# Patient Record
Sex: FEMALE | Race: WHITE | Employment: OTHER | URBAN - METROPOLITAN AREA
[De-identification: names, ages, dates, MRNs, and addresses within clinical notes are randomized per-mention and may not be internally consistent; named-entity substitution may affect disease eponyms.]

---

## 2017-05-04 ENCOUNTER — OFFICE VISIT (OUTPATIENT)
Dept: OBGYN CLINIC | Age: 77
End: 2017-05-04

## 2017-05-04 VITALS
WEIGHT: 150 LBS | SYSTOLIC BLOOD PRESSURE: 128 MMHG | HEIGHT: 65 IN | DIASTOLIC BLOOD PRESSURE: 86 MMHG | BODY MASS INDEX: 24.99 KG/M2

## 2017-05-04 DIAGNOSIS — Z01.419 ENCOUNTER FOR GYNECOLOGICAL EXAMINATION WITHOUT ABNORMAL FINDING: Primary | ICD-10-CM

## 2017-05-04 NOTE — PATIENT INSTRUCTIONS
Pelvic Exam: Care Instructions  Your Care Instructions    When your doctor examines all of your pelvic organs, it's called a pelvic exam. Two good reasons to have this kind of exam are to check for sexually transmitted infections (STIs) and to get a Pap test. A Pap test is also called a Pap smear. It checks for early changes that can lead to cancer of the cervix. Sometimes a pelvic exam is part of a regular checkup. In this case, you can do some things to make your test results as accurate as possible. · Try to schedule the exam when you don't have your period. · Don't use douches, tampons, or vaginal medicines, sprays, or powders for 24 hours before your exam.  · Don't have sex for 24 hours before your exam.  Other times, women have this kind of exam at any time of the month. This is because they have pelvic pain, bleeding, or discharge. Or they may have another pelvic problem. Before your exam, it's important to share some information with your doctor. For example, if you are a survivor of rape or sexual abuse, you can talk about any concerns you may have. Your doctor will also want to know if you are pregnant or use birth control. And he or she will want to hear about any problems, surgeries, or procedures you have had in your pelvic area. You will also need to tell your doctor when your last period was. Follow-up care is a key part of your treatment and safety. Be sure to make and go to all appointments, and call your doctor if you are having problems. It's also a good idea to know your test results and keep a list of the medicines you take. How is a pelvic exam done? · During a pelvic exam, you will:  ¨ Take off your clothes below the waist. You will get a paper or cloth cover to put over the lower half of your body. Madhavi Dawley on your back on an exam table. Your feet will be raised above you. Stirrups will support your feet. · The doctor will:  Seth Baer you to relax your knees.  Your knees need to lean out, toward the walls. ¨ Check the opening of your vagina for sores or swelling. ¨ Gently put a tool called a speculum into your vagina. It opens the vagina a little bit. You will feel some pressure. But if you are relaxed, it will not hurt. It lets your doctor see inside the vagina. ¨ Use a small brush, spatula, or swab to get a sample of cells, if you are having a Pap test or culture. The doctor then removes the speculum. ¨ Put on gloves and put one or two fingers of one hand into your vagina. The other hand goes on your lower belly. This lets your doctor feel your pelvic organs. You will probably feel some pressure. Try to stay relaxed. ¨ Put one gloved finger into your rectum and one into your vagina, if needed. This can also help check your pelvic organs. This exam takes about 10 minutes. At the end, you will get a washcloth or tissue to clean your vaginal area. It's normal to have some discharge after this exam. You can then get dressed. Some test results may be ready right away. But results from a culture or a Pap test may take several days or a few weeks. Why should you have a pelvic exam?  · You want to have recommended screening tests. This includes a Pap test.  · You think you have a vaginal infection. Signs include itching, burning, or unusual discharge. · You might have been exposed to a sexually transmitted infection (STI), such as chlamydia or herpes. · You have vaginal bleeding that is not part of your normal menstrual period. · You have pain in your belly or pelvis. · You have been sexually assaulted. A pelvic exam lets your doctor collect evidence and check for STIs. · You are pregnant. · You are having trouble getting pregnant. What are the risks of a pelvic exam?  There are no risks from a pelvic exam.  When should you call for help?   Watch closely for changes in your health, and be sure to contact your doctor if:  · You have heavy bleeding or discharge from your vagina after the exam.  Where can you learn more? Go to http://jayne-neelima.info/. Enter L091 in the search box to learn more about \"Pelvic Exam: Care Instructions. \"  Current as of: October 13, 2016  Content Version: 11.2  © 6923-3655 [employer], [employer]. Care instructions adapted under license by [employer] (which disclaims liability or warranty for this information). If you have questions about a medical condition or this instruction, always ask your healthcare professional. [address] any warranty or liability for your use of this information.

## 2017-05-04 NOTE — PROGRESS NOTES
Annual exam ages 69+    Soila Mosqueda is a No obstetric history on file. ,  78 y.o. female [facility] No LMP recorded. Patient is postmenopausal.  She presents for her annual checkup. She is having no significant problems. Menstrual status:    She is post menopausal.    She denies dysmenorrhea. Hormonal status:  She reports no perimenstrual type symptoms. She is not having vasomotor symptoms. The patient is not using any ERT. Sexual history:    She  reports that she currently engages in sexual activity and has had male partners. She reports using the following method of birth control/protection: None. Medical conditions:    Since her last annual GYN exam about 5 years ago, she has not the following changes in her health history: none. Pap and Mammogram History:    Her most recent Pap smear was normal obtained 5 year(s) ago. The patient had a recent mammogram 8/2016 which was negative for malignancy. Breast Cancer History/Substance Abuse: negative    Osteoporosis History:    Family history does not include a first or second degree relative with osteopenia or osteoporosis. Past Medical History:   Diagnosis Date    Glaucoma     Pap smear for cervical cancer screening     2009 neg 2010 neg 2012 neg     Past Surgical History:   Procedure Laterality Date    HX TONSILLECTOMY           Allergies: Review of patient's allergies indicates no known allergies. Tobacco History:  reports that she has never smoked. She does not have any smokeless tobacco history on file. Alcohol Abuse:  reports that she drinks alcohol. Drug Abuse:  reports that she does not use illicit drugs.     Family Medical/Cancer History:   Family History   Problem Relation Age of Onset    No Known Problems Mother         Review of Systems - History obtained from the patient  Constitutional: negative for weight loss, fever, night sweats  HEENT: negative for hearing loss, earache, congestion, snoring, sorethroat  CV: negative for chest pain, palpitations, edema  Resp: negative for cough, shortness of breath, wheezing  GI: negative for change in bowel habits, abdominal pain, black or bloody stools  : negative for frequency, dysuria, hematuria, vaginal discharge  MSK: negative for back pain, joint pain, muscle pain  Breast: negative for breast lumps, nipple discharge, galactorrhea  Skin :negative for itching, rash, hives  Neuro: negative for dizziness, headache, confusion, weakness  Psych: negative for anxiety, depression, change in mood  Heme/lymph: negative for bleeding, bruising, pallor    Physical Exam    Visit Vitals    /86    Ht 5' 5\" (1.651 m)    Wt 150 lb (68 kg)    BMI 24.96 kg/m2       Constitutional  · Appearance: well-nourished, well developed, alert, in no acute distress    HENT  · Head and Face: appears normal    Neck  · Inspection/Palpation: normal appearance, no masses or tenderness  · Lymph Nodes: no lymphadenopathy present  · Thyroid: gland size normal, nontender, no nodules or masses present on palpation    Chest  · Respiratory Effort: breathing unlabored    Breasts  · Inspection of Breasts: breasts symmetrical, no skin changes, no discharge present, nipple appearance normal, no skin retraction present  · Palpation of Breasts and Axillae: no masses present on palpation, no breast tenderness  · Axillary Lymph Nodes: no lymphadenopathy present    Gastrointestinal  · Abdominal Examination: abdomen non-tender to palpation, normal bowel sounds, no masses present  · Liver and spleen: no hepatomegaly present, spleen not palpable  · Hernias: no hernias identified    Genitourinary  · External Genitalia: normal appearance for age, no discharge present, no tenderness present, no inflammatory lesions present, no masses present, atrophy present  · Vagina: atrophic but otherwise normal vaginal vault without central or paravaginal defects, no discharge present, no inflammatory lesions present, no masses present  · Bladder: non-tender to palpation  · Urethra: appears normal  · Cervix: normal   · Uterus: normal size, shape and consistency  · Adnexa: no adnexal tenderness present, no adnexal masses present  · Perineum: perineum within normal limits, no evidence of trauma, no rashes or skin lesions present  · Anus: anus within normal limits, no hemorrhoids present  · Inguinal Lymph Nodes: no lymphadenopathy present    Skin  · General Inspection: no rash, no lesions identified    Neurologic/Psychiatric  · Mental Status:  · Orientation: grossly oriented to person, place and time  · Mood and Affect: mood normal, affect appropriate    Assessment:  Routine gynecologic examination  Her current medical status is satisfactory with no evidence of significant gynecologic issues.     Plan:  Counseled re: diet, exercise, healthy lifestyle  Return for yearly wellness visits  Rec annual mammogram

## 2017-05-04 NOTE — MR AVS SNAPSHOT
Visit Information Date & Time Provider Department Dept. Phone Encounter #  
 5/4/2017  9:20 AM Kayla Argueta MD [facility] [phone] [id number] Your Appointments 6/1/2017  2:00 PM  
New Patient with Jeane James MD  
[facility]) Appt Note: np, tg 04/17/2017  
 [phone]  
[phone]  
  
   
 [address] [phone] Upcoming Health Maintenance Date Due FOBT Q 1 YEAR AGE 50-75 11/3/1992 ZOSTER VACCINE AGE 60> 11/3/2002 OSTEOPOROSIS SCREENING (DEXA) 11/3/2007 INFLUENZA AGE 9 TO ADULT 8/1/2017 Allergies as of 5/4/2017  Review Complete On: 5/4/2017 By: Rosa Schroeder No Known Allergies Current Immunizations  Never Reviewed No immunizations on file. Not reviewed this visit Vitals BP Height(growth percentile) Weight(growth percentile) BMI OB Status Smoking Status 128/86 5' 5\" (1.651 m) 150 lb (68 kg) 24.96 kg/m2 Postmenopausal Never Smoker BMI and BSA Data Body Mass Index Body Surface Area 24.96 kg/m 2 1.77 m 2 Your Updated Medication List  
  
Notice  As of 5/4/2017  9:35 AM  
 You have not been prescribed any medications. Patient Instructions Pelvic Exam: Care Instructions Your Care Instructions When your doctor examines all of your pelvic organs, it's called a pelvic exam. Two good reasons to have this kind of exam are to check for sexually transmitted infections (STIs) and to get a Pap test. A Pap test is also called a Pap smear. It checks for early changes that can lead to cancer of the cervix. Sometimes a pelvic exam is part of a regular checkup. In this case, you can do some things to make your test results as accurate as possible. · Try to schedule the exam when you don't have your period.  
· Don't use douches, tampons, or vaginal medicines, sprays, or powders for 24 hours before your exam. 
· Don't have sex for 24 hours before your exam. 
Other times, women have this kind of exam at any time of the month. This is because they have pelvic pain, bleeding, or discharge. Or they may have another pelvic problem. Before your exam, it's important to share some information with your doctor. For example, if you are a survivor of rape or sexual abuse, you can talk about any concerns you may have. Your doctor will also want to know if you are pregnant or use birth control. And he or she will want to hear about any problems, surgeries, or procedures you have had in your pelvic area. You will also need to tell your doctor when your last period was. Follow-up care is a key part of your treatment and safety. Be sure to make and go to all appointments, and call your doctor if you are having problems. It's also a good idea to know your test results and keep a list of the medicines you take. How is a pelvic exam done? · During a pelvic exam, you will: ¨ Take off your clothes below the waist. You will get a paper or cloth cover to put over the lower half of your body. Zonia Men on your back on an exam table. Your feet will be raised above you. Stirrups will support your feet. · The doctor will: ¨ Ask you to relax your knees. Your knees need to lean out, toward the walls. ¨ Check the opening of your vagina for sores or swelling. ¨ Gently put a tool called a speculum into your vagina. It opens the vagina a little bit. You will feel some pressure. But if you are relaxed, it will not hurt. It lets your doctor see inside the vagina. ¨ Use a small brush, spatula, or swab to get a sample of cells, if you are having a Pap test or culture. The doctor then removes the speculum. ¨ Put on gloves and put one or two fingers of one hand into your vagina. The other hand goes on your lower belly. This lets your doctor feel your pelvic organs. You will probably feel some pressure. Try to stay relaxed. ¨ Put one gloved finger into your rectum and one into your vagina, if needed. This can also help check your pelvic organs. This exam takes about 10 minutes. At the end, you will get a washcloth or tissue to clean your vaginal area. It's normal to have some discharge after this exam. You can then get dressed. Some test results may be ready right away. But results from a culture or a Pap test may take several days or a few weeks. Why should you have a pelvic exam? 
· You want to have recommended screening tests. This includes a Pap test. 
· You think you have a vaginal infection. Signs include itching, burning, or unusual discharge. · You might have been exposed to a sexually transmitted infection (STI), such as chlamydia or herpes. · You have vaginal bleeding that is not part of your normal menstrual period. · You have pain in your belly or pelvis. · You have been sexually assaulted. A pelvic exam lets your doctor collect evidence and check for STIs. · You are pregnant. · You are having trouble getting pregnant. What are the risks of a pelvic exam? 
There are no risks from a pelvic exam. 
When should you call for help? Watch closely for changes in your health, and be sure to contact your doctor if: 
· You have heavy bleeding or discharge from your vagina after the exam. 
Where can you learn more? Go to http://jayne-neelima.info/. Enter Z287 in the search box to learn more about \"Pelvic Exam: Care Instructions. \" Current as of: October 13, 2016 Content Version: 11.2 © 8450-0897 [employer]. Care instructions adapted under license by [employer] (which disclaims liability or warranty for this information). If you have questions about a medical condition or this instruction, always ask your healthcare professional. [place] [address] any warranty or liability for your use of this information. Introducing [facility]! Dear Rere Pereira: Thank you for requesting a [employer] account. Our records indicate that you already have an active [employer] account. You can access your account anytime at [email or web address] Did you know that you can access your hospital and ER discharge instructions at any time in Elect[employer]us? You can also review all of your test results from your hospital stay or ER visit. Additional Information If you have questions, please visit the Frequently Asked Questions section of the [employer] website at [email or web address] Remember, Electrikus is NOT to be used for urgent needs. For medical emergencies, dial 911. Now available from your iPhone and Android! Please provide this summary of care documentation to your next provider. Your primary care clinician is listed as [address] [place] [address] Street. If you have any questions after today's visit, please call [phone].

## 2017-06-27 ENCOUNTER — OFFICE VISIT (OUTPATIENT)
Dept: INTERNAL MEDICINE CLINIC | Age: 77
End: 2017-06-27

## 2017-06-27 VITALS
RESPIRATION RATE: 18 BRPM | DIASTOLIC BLOOD PRESSURE: 57 MMHG | HEART RATE: 55 BPM | TEMPERATURE: 98.1 F | WEIGHT: 151.4 LBS | BODY MASS INDEX: 25.22 KG/M2 | SYSTOLIC BLOOD PRESSURE: 121 MMHG | HEIGHT: 65 IN | OXYGEN SATURATION: 98 %

## 2017-06-27 DIAGNOSIS — G89.29 CHRONIC BILATERAL LOW BACK PAIN WITHOUT SCIATICA: Primary | ICD-10-CM

## 2017-06-27 DIAGNOSIS — R73.02 GLUCOSE INTOLERANCE (IMPAIRED GLUCOSE TOLERANCE): ICD-10-CM

## 2017-06-27 DIAGNOSIS — G25.81 RESTLESS LEGS: ICD-10-CM

## 2017-06-27 DIAGNOSIS — H40.9 GLAUCOMA OF BOTH EYES, UNSPECIFIED GLAUCOMA: ICD-10-CM

## 2017-06-27 DIAGNOSIS — Z12.39 BREAST CANCER SCREENING: ICD-10-CM

## 2017-06-27 DIAGNOSIS — E55.9 VITAMIN D DEFICIENCY: ICD-10-CM

## 2017-06-27 DIAGNOSIS — Z00.00 WELL ADULT EXAM: ICD-10-CM

## 2017-06-27 DIAGNOSIS — M54.50 CHRONIC BILATERAL LOW BACK PAIN WITHOUT SCIATICA: Primary | ICD-10-CM

## 2017-06-27 RX ORDER — DORZOLAMIDE HYDROCHLORIDE AND TIMOLOL MALEATE 20; 5 MG/ML; MG/ML
1 SOLUTION/ DROPS OPHTHALMIC 2 TIMES DAILY
COMMUNITY
Start: 2017-05-22

## 2017-06-27 RX ORDER — OMEGA-3S/DHA/EPA/FISH OIL/D3 300MG-1000
CAPSULE ORAL
COMMUNITY
End: 2021-04-15

## 2017-06-27 RX ORDER — LATANOPROST 50 UG/ML
1 SOLUTION/ DROPS OPHTHALMIC DAILY
COMMUNITY
Start: 2017-04-25

## 2017-06-27 NOTE — PROGRESS NOTES
Collin Salas is a 78 y.o. female and presents with Establish Care (concerned may have thyroid problem) and Back Pain (chronic back pain)  . Subjective:  Pt presents for NPV. She came to [facility] to be near her daughters but both daughters moved. She feels she was misdiagnosed with diabetes many years ago. she was not in injcetion    Glaucoma  Followed by dr. Kuldeep Baird    Back pain  She has had physical therapy with some relief  She came off the opiod medication because she was feeling addicted to it and would wait to see when the [employer] would arrive with her medication  Floor exercises and sleeping on side are helpful  Does not hurt if sitting down or lying down  Most painful if standing severe pain after 5 min of standing  Some nights sleeps well but other nights not well rested. Restless legs  She reports she has to keep her legs moving and difficulty staying asleep because needs to move legs. She is concerned she has low thyroid because father and sister with thyroid issues    Review of Systems  Constitutional: negative for fevers, chills, anorexia and weight loss  Eyes:   negative for visual disturbance and irritation  ENT:   negative for tinnitus,sore throat,nasal congestion,ear pains. hoarseness  Respiratory:  negative for cough, hemoptysis, dyspnea,wheezing  CV:   negative for chest pain, palpitations, lower extremity edema  GI:   negative for nausea, vomiting, diarrhea, abdominal pain,melena  Endo:               negative for polyuria,polydipsia,polyphagia,heat intolerance  Genitourinary: negative for frequency, dysuria and hematuria  Integument:  negative for rash and pruritus  Hematologic:  negative for easy bruising and gum/nose bleeding  Musculoskel: negative for myalgias, arthralgias, back pain, muscle weakness, joint pain  Neurological:  negative for headaches, dizziness, vertigo, memory problems and gait   Behavl/Psych: negative for feelings of anxiety, depression, mood changes    Past Medical History:   Diagnosis Date    Glaucoma     Dr. Davis Quiet    Pap smear for cervical cancer screening     2009 neg 2010 neg 2012 neg     Past Surgical History:   Procedure Laterality Date    HX TONSILLECTOMY       Social History     Social History    Marital status:      Spouse name: N/A    Number of children: N/A    Years of education: N/A     Social History Main Topics    Smoking status: Never Smoker    Smokeless tobacco: Never Used    Alcohol use Yes      Comment: rarely    Drug use: No    Sexual activity: Yes     Partners: Male     Birth control/ protection: None     Other Topics Concern    None     Social History Narrative    Retired from [employer]         to  healthy    2 children daughters : healthy    2 grandson's : healthy     Family History   Problem Relation Age of Onset    No Known Problems Mother      Current Outpatient Prescriptions   Medication Sig Dispense Refill    dorzolamide-timolol (COSOPT) 22.3-6.8 mg/mL ophthalmic solution       latanoprost (XALATAN) 0.005 % ophthalmic solution       cholecalciferol, vitamin D3, (VITAMIN D3) 2,000 unit tab Take  by mouth.        No Known Allergies    Objective:  Visit Vitals    /57    Pulse (!) 55    Temp 98.1 °F (36.7 °C) (Oral)    Resp 18    Ht 5' 5\" (1.651 m)    Wt 151 lb 6.4 oz (68.7 kg)    SpO2 98%    BMI 25.19 kg/m2     Physical Exam:   General appearance - alert, well appearing, and in no distress  Mental status - alert, oriented to person, place, and time  EYE-CHENG, EOMI, corneas normal, no foreign bodies, visual acuity normal both eyes, no periorbital cellulitis  ENT-ENT exam normal, no neck nodes or sinus tenderness  Nose - normal and patent, no erythema, discharge or polyps  Mouth - mucous membranes moist, pharynx normal without lesions  Neck - supple, no significant adenopathy   Chest - clear to auscultation, no wheezes, rales or rhonchi, symmetric air entry   Heart - normal rate, regular rhythm, normal S1, S2, no murmurs, rubs, clicks or gallops   Abdomen - soft, nontender, nondistended, no masses or organomegaly  Lymph- no adenopathy palpable  Ext-peripheral pulses normal, no pedal edema, no clubbing or cyanosis  Skin-Warm and dry. no hyperpigmentation, vitiligo, or suspicious lesions  Neuro -alert, oriented, normal speech, no focal findings or movement disorder noted  Neck-normal C-spine, no tenderness, full ROM without pain      No results found for this or any previous visit. Prevention    Cardiovascular profile  Family hx  Exercising:  exercisng in yard  Blood pressure:  Health healthy diet:  Diabetes:  Cholesterol:  Renal function:      Cancer risk profile  Mammogram 2016, Dr. Mattie Mccollum  Colonoscopy 2015 done  Skin nonhealing in 2 weeks dermatology Dr. Dewey Meadows abnormal bleeding/discharge/abd pain/pressure ana rosa michael      Thyroid sx    Osteopenia prevention  Calcium 1000mg/day yes  Vitamin D 800iu/day yes    Mental health scale:  Very good but feels memory compromsised  Depression  Anxiety  Sleep # of hours:  Energy Level:        Immunizations defers all vaccines because had bad event with injection several years ago 2015 6 months required PT to stop tendonitis  TDAP  Pneumonia vaccine  Flu vaccine  Shingles vaccine  HPV      Shantel Palomares was seen today for establish care and back pain. Diagnoses and all orders for this visit:    Chronic bilateral low back pain without sciatica  Discussed with pt use of melatonin to help with sleep  Discussed possiblity of use of gabapentin low dose to see if helps nerve pain    Restless legs  Possibly has low iron  -     CBC W/O DIFF; Future  -     TSH 3RD GENERATION; Future  -     METABOLIC PANEL, COMPREHENSIVE; Future  -     FERRITIN; Future    Well adult exam  -     LIPID PANEL; Future    Glaucoma of both eyes, unspecified glaucoma  Follow up dr. Taz Zamora    Glucose intolerance (impaired glucose tolerance)  Check labs  -     HEMOGLOBIN A1C WITH EAG;  Future  - METABOLIC PANEL, COMPREHENSIVE; Future    Vitamin D deficiency  -     VITAMIN D, 25 HYDROXY; Future    Breast cancer screening  -     KEITH MAMMO BI SCREENING INCL CAD; Future    This note will not be viewable in MyChart. I spent 30 min with this new pt and >50% of the time was spent in management and counseling of pt re: management of back pain without pain medication. Will work on sleep. Will consider gabapentin if needed. She did mention her memory was decreasing and we discussed possibly doing a SLUMS test at her next visit if still needed.  shawn

## 2017-06-27 NOTE — MR AVS SNAPSHOT
Visit Information Date & Time Provider Department Dept. Phone Encounter #  
 6/27/2017  2:45 PM Misty Easton MD [facility] [address] [id number] Your Appointments 9/21/2017  8:45 AM  
COMPLETE PHYSICAL with Misty Easton MD  
[facility]) Appt Note: CPE  $15CP $0PB sc 06/01/17 [address] [facility] [id number]  
[phone]  
  
   
 [address] [facility] [address] Upcoming Health Maintenance Date Due DTaP/Tdap/Td series (1 - Tdap) 11/3/1963 FOBT Q 1 YEAR AGE 50-75 11/3/1992 ZOSTER VACCINE AGE 60> 11/3/2002 Pneumococcal 65+ Low/Medium Risk (1 of 2 - PCV13) 11/3/2007 MEDICARE YEARLY EXAM 11/3/2007 INFLUENZA AGE 9 TO ADULT 8/1/2017 GLAUCOMA SCREENING Q2Y 1/15/2019 Allergies as of 6/27/2017  Review Complete On: 6/27/2017 By: Misty Easton MD  
 No Known Allergies Current Immunizations  Never Reviewed No immunizations on file. Not reviewed this visit You Were Diagnosed With   
  
 Codes Comments Chronic bilateral low back pain without sciatica    -  Primary ICD-10-CM: M54.5, G89.29 ICD-9-CM: 724.2, 338.29 Restless legs     ICD-10-CM: G25.81 ICD-9-CM: 333.94 Well adult exam     ICD-10-CM: Z00.00 ICD-9-CM: V70.0 Glaucoma of both eyes, unspecified glaucoma     ICD-10-CM: H40.9 ICD-9-CM: 365.9 Glucose intolerance (impaired glucose tolerance)     ICD-10-CM: R73.02 
ICD-9-CM: 790.22 Vitamin D deficiency     ICD-10-CM: E55.9 ICD-9-CM: 268.9 Breast cancer screening     ICD-10-CM: Z12.39 
ICD-9-CM: V76.10 Vitals BP Pulse Temp Resp Height(growth percentile) Weight(growth percentile) 121/57 (!) 55 98.1 °F (36.7 °C) (Oral) 18 5' 5\" (1.651 m) 151 lb 6.4 oz (68.7 kg) SpO2 BMI OB Status Smoking Status 98% 25.19 kg/m2 Postmenopausal Never Smoker Vitals History BMI and BSA Data Body Mass Index Body Surface Area  
 25.19 kg/m 2 1.78 m 2 Preferred Pharmacy Pharmacy Name Phone [facility] [address] [place] [phone] [address] [phone] Your Updated Medication List  
  
   
This list is accurate as of: 6/27/17  3:47 PM.  Always use your most recent med list.  
  
  
  
  
 dorzolamide-timolol 22.3-6.8 mg/mL ophthalmic solution Commonly known as:  COSOPT  
  
 latanoprost 0.005 % ophthalmic solution Commonly known as:  XALATAN  
  
 VITAMIN D3 2,000 unit Tab Generic drug:  cholecalciferol (vitamin D3) Take  by mouth. To-Do List   
 06/27/2017 Lab:  CBC W/O DIFF   
  
 06/27/2017 Lab:  FERRITIN   
  
 06/27/2017 Lab:  HEMOGLOBIN A1C WITH EAG   
  
 06/27/2017 Lab:  LIPID PANEL   
  
 06/27/2017 Imaging:  KEITH MAMMO BI SCREENING INCL CAD   
  
 06/27/2017 Lab:  METABOLIC PANEL, COMPREHENSIVE   
  
 06/27/2017 Lab:  TSH 3RD GENERATION   
  
 06/27/2017 Lab:  VITAMIN D, 25 HYDROXY Patient Instructions Learning About How to Have a Healthy Back What causes back pain? Back pain is often caused by overuse, strain, or injury. For example, people often hurt their backs playing sports or working in the yard, being jolted in a car accident, or lifting something too heavy. Aging plays a part too. Your bones and muscles tend to lose strength as you age, which makes injury more likely. The spongy discs between the bones of the spine (vertebrae) may suffer from wear and tear and no longer provide enough cushion between the bones. A disc that bulges or breaks open (herniated disc) can press on nerves, causing back pain. In some people, back pain is the result of arthritis, broken vertebrae caused by bone loss (osteoporosis), illness, or a spine problem.  
Although most people have back pain at one time or another, there are steps you can take to make it less likely. How can you have a healthy back? Reduce stress on your back through good posture Slumping or slouching alone may not cause low back pain. But after the back has been strained or injured, bad posture can make pain worse. · Sleep in a position that maintains your back's normal curves and on a mattress that feels comfortable. Sleep on your side with a pillow between your knees, or sleep on your back with a pillow under your knees. These positions can reduce strain on your back. · Stand and sit up straight. \"Good posture\" generally means your ears, shoulders, and hips are in a straight line. · If you must stand for a long time, put one foot on a stool, ledge, or box. Switch feet every now and then. · Sit in a chair that is low enough to let you place both feet flat on the floor with both knees nearly level with your hips. If your chair or desk is too high, use a footrest to raise your knees. Place a small pillow, a rolled-up towel, or a lumbar roll in the curve of your back if you need extra support. · Try a kneeling chair, which helps tilt your hips forward. This takes pressure off your lower back. · Try sitting on an exercise ball. It can rock from side to side, which helps keep your back loose. · When driving, keep your knees nearly level with your hips. Sit straight, and drive with both hands on the steering wheel. Your arms should be in a slightly bent position. Reduce stress on your back through careful lifting · Squat down, bending at the hips and knees only. If you need to, put one knee to the floor and extend your other knee in front of you, bent at a right angle (half kneeling). · Press your chest straight forward. This helps keep your upper back straight while keeping a slight arch in your low back. · Hold the load as close to your body as possible, at the level of your belly button (navel). · Use your feet to change direction, taking small steps. · Lead with your hips as you change direction. Keep your shoulders in line with your hips as you move. · Set down your load carefully, squatting with your knees and hips only. Exercise and stretch your back · Do some exercise on most days of the week, if your doctor says it is okay. You can walk, run, swim, or cycle. · Stretch your back muscles. Here are a few exercises to try: ¨ Lie on your back, and gently pull one bent knee to your chest. Put that foot back on the floor, and then pull the other knee to your chest. 
¨ Do pelvic tilts. Lie on your back with your knees bent. Tighten your stomach muscles. Pull your belly button (navel) in and up toward your ribs. You should feel like your back is pressing to the floor and your hips and pelvis are slightly lifting off the floor. Hold for 6 seconds while breathing smoothly. ¨ Sit with your back flat against a wall. · Keep your core muscles strong. The muscles of your back, belly (abdomen), and buttocks support your spine. ¨ Pull in your belly and imagine pulling your navel toward your spine. Hold this for 6 seconds, then relax. Remember to keep breathing normally as you tense your muscles. ¨ Do curl-ups. Always do them with your knees bent. Keep your low back on the floor, and curl your shoulders toward your knees using a smooth, slow motion. Keep your arms folded across your chest. If this bothers your neck, try putting your hands behind your neck (not your head), with your elbows spread apart. ¨ Lie on your back with your knees bent and your feet flat on the floor. Tighten your belly muscles, and then push with your feet and raise your buttocks up a few inches. Hold this position 6 seconds as you continue to breathe normally, then lower yourself slowly to the floor. Repeat 8 to 12 times. ¨ If you like group exercise, try Pilates or yoga. These classes have poses that strengthen the core muscles. Lead a healthy lifestyle · Stay at a healthy weight to avoid strain on your back. · Do not smoke. Smoking increases the risk of osteoporosis, which weakens the spine. If you need help quitting, talk to your doctor about stop-smoking programs and medicines. These can increase your chances of quitting for good. Where can you learn more? Go to http://jayne-neelima.info/. Enter L315 in the search box to learn more about \"Learning About How to Have a Healthy Back. \" Current as of: March 21, 2017 Content Version: 11.3 © 4369-4748 [employer]. Care instructions adapted under license by [employer] (which disclaims liability or warranty for this information). If you have questions about a medical condition or this instruction, always ask your healthcare professional. [address] any warranty or liability for your use of this information. Gabapentin (By mouth) Gabapentin (cari-a-PEN-tin) Treats seizures and pain caused by shingles. Brand Name(s): ACTIVE-PAC with Gabapentin, Convenience Ramon, Cyclo/China 10/300 Pack, FusePaq Fanatrex, China-V, Gralise, Neurontin, Zoroastrian-[place] There may be other brand names for this medicine. When This Medicine Should Not Be Used: This medicine is not right for everyone. Do not use it if you had an allergic reaction to gabapentin. How to Use This Medicine:  
Capsule, Liquid, Tablet · Take your medicine as directed. Your dose may need to be changed several times to find what works best for you. If you have epilepsy, do not allow more than 12 hours to pass between doses. · Capsule: Swallow the capsule whole with plenty of water. Do not open, crush, or chew it. · Gralise® tablet: Swallow the tablet whole . Do not crush, break, or chew it. · Neurontin® tablet: If you break a tablet into 2 pieces, use the second half as your next dose. If you don't use it within 28 days, throw it away. · Measure the oral liquid medicine with a marked measuring spoon, oral syringe, or medicine cup. · This medicine should come with a Medication Guide. Ask your pharmacist for a copy if you do not have one. · Missed dose: Take a dose as soon as you remember. If it is almost time for your next dose, wait until then and take a regular dose. Do not take extra medicine to make up for a missed dose. · Store the medicine in a closed container at room temperature, away from heat, moisture, and direct light. Store the Neurontin® oral liquid in the refrigerator. Do not freeze. Drugs and Foods to Avoid: Ask your doctor or pharmacist before using any other medicine, including over-the-counter medicines, vitamins, and herbal products. · Some medicines can affect how gabapentin works. Tell your doctor if you also use any of the following: ¨ Hydrocodone ¨ Morphine · If you take an antacid, wait at least 2 hours before you take gabapentin. · Tell your doctor if you use anything else that makes you sleepy. Some examples are allergy medicine, narcotic pain medicine, and alcohol. Warnings While Using This Medicine: · Tell your doctor if you are pregnant or breastfeeding, or if you have kidney problems or are receiving dialysis. Tell your doctor if you have a history of depression or mental health problems. · This medicine may increase depression or thoughts of suicide. Tell your doctor right away if you start to feel more depressed or think about hurting yourself. · This medicine may cause a serious allergic reaction called multiorgan hypersensitivity, which can damage organs and be life-threatening. · Do not stop using this medicine suddenly. Your doctor will need to slowly decrease your dose before you stop it completely. If you take this medicine to prevent seizures, your seizures may return or occur more often if you stop this medicine suddenly. · This medicine may make you dizzy or drowsy. Do not drive or do anything else that could be dangerous until you know how this medicine affects you. · Tell any doctor or dentist who treats you that you are using this medicine. This medicine may affect certain medical test results. · Your doctor will check your progress and the effects of this medicine at regular visits. Keep all appointments. · Keep all medicine out of the reach of children. Never share your medicine with anyone. Possible Side Effects While Using This Medicine:  
Call your doctor right away if you notice any of these side effects: · Allergic reaction: Itching or hives, swelling in your face or hands, swelling or tingling in your mouth or throat, chest tightness, trouble breathing · Behavior problems, aggression, restlessness, trouble concentrating, moodiness (especially in children) · Blistering, peeling, red skin rash · Change in how much or how often you urinate, bloody or cloudy urine, 
· Chest pain, fast heartbeat, trouble breathing · Dark urine or pale stools, nausea, vomiting, loss of appetite, stomach pain, yellow skin or eyes · Fever, rash, swollen or tender glands in the neck, armpit, or groin · Problems with coordination, shakiness, unsteadiness · Rapid weight gain, swelling in your hands, ankles, or feet · Unusual moods or behaviors, thoughts of hurting yourself, feeling depressed If you notice these less serious side effects, talk with your doctor: · Dizziness, drowsiness, sleepiness, tiredness If you notice other side effects that you think are caused by this medicine, tell your doctor. Call your doctor for medical advice about side effects. You may report side effects to FDA at 1-532-FDA-6017 © 2017 [address] Information is for End User's use only and may not be sold, redistributed or otherwise used for commercial purposes. The above information is an  only.  It is not intended as medical advice for individual conditions or treatments. Talk to your doctor, nurse or pharmacist before following any medical regimen to see if it is safe and effective for you. Introducing [facility]! Dear Domenico Sharma: Thank you for requesting a Easy [employer] account. Our records indicate that you already have an active Easy [employer] account. You can access your account anytime at [email or web address] Did you know that you can access your hospital and ER discharge instructions at any time in Easy [employer]age? You can also review all of your test results from your hospital stay or ER visit. Additional Information If you have questions, please visit the Frequently Asked Questions section of the Easy [employer] website at [email or web address] Remember, Easy Voyage is NOT to be used for urgent needs. For medical emergencies, dial 911. Now available from your iPhone and Android! Please provide this summary of care documentation to your next provider. Your primary care clinician is listed as Michelle Beaver. If you have any questions after today's visit, please call [phone].

## 2017-06-27 NOTE — PATIENT INSTRUCTIONS
Learning About How to Have a Healthy Back  What causes back pain? Back pain is often caused by overuse, strain, or injury. For example, people often hurt their backs playing sports or working in the yard, being jolted in a car accident, or lifting something too heavy. Aging plays a part too. Your bones and muscles tend to lose strength as you age, which makes injury more likely. The spongy discs between the bones of the spine (vertebrae) may suffer from wear and tear and no longer provide enough cushion between the bones. A disc that bulges or breaks open (herniated disc) can press on nerves, causing back pain. In some people, back pain is the result of arthritis, broken vertebrae caused by bone loss (osteoporosis), illness, or a spine problem. Although most people have back pain at one time or another, there are steps you can take to make it less likely. How can you have a healthy back? Reduce stress on your back through good posture  Slumping or slouching alone may not cause low back pain. But after the back has been strained or injured, bad posture can make pain worse. · Sleep in a position that maintains your back's normal curves and on a mattress that feels comfortable. Sleep on your side with a pillow between your knees, or sleep on your back with a pillow under your knees. These positions can reduce strain on your back. · Stand and sit up straight. \"Good posture\" generally means your ears, shoulders, and hips are in a straight line. · If you must stand for a long time, put one foot on a stool, ledge, or box. Switch feet every now and then. · Sit in a chair that is low enough to let you place both feet flat on the floor with both knees nearly level with your hips. If your chair or desk is too high, use a footrest to raise your knees. Place a small pillow, a rolled-up towel, or a lumbar roll in the curve of your back if you need extra support.   · Try a kneeling chair, which helps tilt your hips forward. This takes pressure off your lower back. · Try sitting on an exercise ball. It can rock from side to side, which helps keep your back loose. · When driving, keep your knees nearly level with your hips. Sit straight, and drive with both hands on the steering wheel. Your arms should be in a slightly bent position. Reduce stress on your back through careful lifting  · Squat down, bending at the hips and knees only. If you need to, put one knee to the floor and extend your other knee in front of you, bent at a right angle (half kneeling). · Press your chest straight forward. This helps keep your upper back straight while keeping a slight arch in your low back. · Hold the load as close to your body as possible, at the level of your belly button (navel). · Use your feet to change direction, taking small steps. · Lead with your hips as you change direction. Keep your shoulders in line with your hips as you move. · Set down your load carefully, squatting with your knees and hips only. Exercise and stretch your back  · Do some exercise on most days of the week, if your doctor says it is okay. You can walk, run, swim, or cycle. · Stretch your back muscles. Here are a few exercises to try:  Donnise Falter on your back, and gently pull one bent knee to your chest. Put that foot back on the floor, and then pull the other knee to your chest.  ¨ Do pelvic tilts. Lie on your back with your knees bent. Tighten your stomach muscles. Pull your belly button (navel) in and up toward your ribs. You should feel like your back is pressing to the floor and your hips and pelvis are slightly lifting off the floor. Hold for 6 seconds while breathing smoothly. ¨ Sit with your back flat against a wall. · Keep your core muscles strong. The muscles of your back, belly (abdomen), and buttocks support your spine. ¨ Pull in your belly and imagine pulling your navel toward your spine. Hold this for 6 seconds, then relax.  Remember to keep breathing normally as you tense your muscles. ¨ Do curl-ups. Always do them with your knees bent. Keep your low back on the floor, and curl your shoulders toward your knees using a smooth, slow motion. Keep your arms folded across your chest. If this bothers your neck, try putting your hands behind your neck (not your head), with your elbows spread apart. ¨ Lie on your back with your knees bent and your feet flat on the floor. Tighten your belly muscles, and then push with your feet and raise your buttocks up a few inches. Hold this position 6 seconds as you continue to breathe normally, then lower yourself slowly to the floor. Repeat 8 to 12 times. ¨ If you like group exercise, try Pilates or yoga. These classes have poses that strengthen the core muscles. Lead a healthy lifestyle  · Stay at a healthy weight to avoid strain on your back. · Do not smoke. Smoking increases the risk of osteoporosis, which weakens the spine. If you need help quitting, talk to your doctor about stop-smoking programs and medicines. These can increase your chances of quitting for good. Where can you learn more? Go to http://jayneYakazneelima.info/. Enter L315 in the search box to learn more about \"Learning About How to Have a Healthy Back. \"  Current as of: March 21, 2017  Content Version: 11.3  © 5097-6060 [employer]. Care instructions adapted under license by [employer] (which disclaims liability or warranty for this information). If you have questions about a medical condition or this instruction, always ask your healthcare professional. [place] [address] any warranty or liability for your use of this information. Gabapentin (By mouth)   Gabapentin (cari-a-PEN-tin)  Treats seizures and pain caused by shingles.    Brand Name(s): ACTIVE-PAC with Gabapentin, Convenience Ramon, Cyclo/China 10/300 Pack, FusePaq Fanatrex, China-V, Gralise, Neurontin, SmartRx China Kit   There may be other brand names for this medicine. When This Medicine Should Not Be Used: This medicine is not right for everyone. Do not use it if you had an allergic reaction to gabapentin. How to Use This Medicine:   Capsule, Liquid, Tablet  · Take your medicine as directed. Your dose may need to be changed several times to find what works best for you. If you have epilepsy, do not allow more than 12 hours to pass between doses. · Capsule: Swallow the capsule whole with plenty of water. Do not open, crush, or chew it. · Gralise® tablet: Swallow the tablet whole . Do not crush, break, or chew it. · Neurontin® tablet: If you break a tablet into 2 pieces, use the second half as your next dose. If you don't use it within 28 days, throw it away. · Measure the oral liquid medicine with a marked measuring spoon, oral syringe, or medicine cup. · This medicine should come with a Medication Guide. Ask your pharmacist for a copy if you do not have one. · Missed dose: Take a dose as soon as you remember. If it is almost time for your next dose, wait until then and take a regular dose. Do not take extra medicine to make up for a missed dose. · Store the medicine in a closed container at room temperature, away from heat, moisture, and direct light. Store the Neurontin® oral liquid in the refrigerator. Do not freeze. Drugs and Foods to Avoid:   Ask your doctor or pharmacist before using any other medicine, including over-the-counter medicines, vitamins, and herbal products. · Some medicines can affect how gabapentin works. Tell your doctor if you also use any of the following:   ¨ Hydrocodone  ¨ Morphine  · If you take an antacid, wait at least 2 hours before you take gabapentin. · Tell your doctor if you use anything else that makes you sleepy. Some examples are allergy medicine, narcotic pain medicine, and alcohol.   Warnings While Using This Medicine:   · Tell your doctor if you are pregnant or breastfeeding, or if you have kidney problems or are receiving dialysis. Tell your doctor if you have a history of depression or mental health problems. · This medicine may increase depression or thoughts of suicide. Tell your doctor right away if you start to feel more depressed or think about hurting yourself. · This medicine may cause a serious allergic reaction called multiorgan hypersensitivity, which can damage organs and be life-threatening. · Do not stop using this medicine suddenly. Your doctor will need to slowly decrease your dose before you stop it completely. If you take this medicine to prevent seizures, your seizures may return or occur more often if you stop this medicine suddenly. · This medicine may make you dizzy or drowsy. Do not drive or do anything else that could be dangerous until you know how this medicine affects you. · Tell any doctor or dentist who treats you that you are using this medicine. This medicine may affect certain medical test results. · Your doctor will check your progress and the effects of this medicine at regular visits. Keep all appointments. · Keep all medicine out of the reach of children. Never share your medicine with anyone.   Possible Side Effects While Using This Medicine:   Call your doctor right away if you notice any of these side effects:  · Allergic reaction: Itching or hives, swelling in your face or hands, swelling or tingling in your mouth or throat, chest tightness, trouble breathing  · Behavior problems, aggression, restlessness, trouble concentrating, moodiness (especially in children)  · Blistering, peeling, red skin rash  · Change in how much or how often you urinate, bloody or cloudy urine,  · Chest pain, fast heartbeat, trouble breathing  · Dark urine or pale stools, nausea, vomiting, loss of appetite, stomach pain, yellow skin or eyes  · Fever, rash, swollen or tender glands in the neck, armpit, or groin  · Problems with coordination, shakiness, unsteadiness  · Rapid weight gain, swelling in your hands, ankles, or feet  · Unusual moods or behaviors, thoughts of hurting yourself, feeling depressed  If you notice these less serious side effects, talk with your doctor:   · Dizziness, drowsiness, sleepiness, tiredness  If you notice other side effects that you think are caused by this medicine, tell your doctor. Call your doctor for medical advice about side effects. You may report side effects to FDA at 6-204-YXH-9835  © 2017 [address]  Information is for End User's use only and may not be sold, redistributed or otherwise used for commercial purposes. The above information is an  only. It is not intended as medical advice for individual conditions or treatments. Talk to your doctor, nurse or pharmacist before following any medical regimen to see if it is safe and effective for you.

## 2017-06-28 ENCOUNTER — TELEPHONE (OUTPATIENT)
Dept: INTERNAL MEDICINE CLINIC | Age: 77
End: 2017-06-28

## 2017-06-28 LAB
25(OH)D3+25(OH)D2 SERPL-MCNC: 48.4 NG/ML (ref 30–100)
ALBUMIN SERPL-MCNC: 4.1 G/DL (ref 3.5–4.8)
ALBUMIN/GLOB SERPL: 1.6 {RATIO} (ref 1.2–2.2)
ALP SERPL-CCNC: 50 IU/L (ref 39–117)
ALT SERPL-CCNC: 11 IU/L (ref 0–32)
AST SERPL-CCNC: 17 IU/L (ref 0–40)
BILIRUB SERPL-MCNC: 0.4 MG/DL (ref 0–1.2)
BUN SERPL-MCNC: 25 MG/DL (ref 8–27)
BUN/CREAT SERPL: 35 (ref 12–28)
CALCIUM SERPL-MCNC: 9.4 MG/DL (ref 8.7–10.3)
CHLORIDE SERPL-SCNC: 99 MMOL/L (ref 96–106)
CHOLEST SERPL-MCNC: 192 MG/DL (ref 100–199)
CO2 SERPL-SCNC: 24 MMOL/L (ref 18–29)
CREAT SERPL-MCNC: 0.72 MG/DL (ref 0.57–1)
ERYTHROCYTE [DISTWIDTH] IN BLOOD BY AUTOMATED COUNT: 14.3 % (ref 12.3–15.4)
EST. AVERAGE GLUCOSE BLD GHB EST-MCNC: 91 MG/DL
FERRITIN SERPL-MCNC: 131 NG/ML (ref 15–150)
GLOBULIN SER CALC-MCNC: 2.6 G/DL (ref 1.5–4.5)
GLUCOSE SERPL-MCNC: 83 MG/DL (ref 65–99)
HBA1C MFR BLD: 4.8 % (ref 4.8–5.6)
HCT VFR BLD AUTO: 41.2 % (ref 34–46.6)
HDLC SERPL-MCNC: 74 MG/DL
HGB BLD-MCNC: 14.3 G/DL (ref 11.1–15.9)
INTERPRETATION, 910389: NORMAL
LDLC SERPL CALC-MCNC: 98 MG/DL (ref 0–99)
MCH RBC QN AUTO: 31.1 PG (ref 26.6–33)
MCHC RBC AUTO-ENTMCNC: 34.7 G/DL (ref 31.5–35.7)
MCV RBC AUTO: 90 FL (ref 79–97)
PLATELET # BLD AUTO: 228 X10E3/UL (ref 150–379)
POTASSIUM SERPL-SCNC: 4.6 MMOL/L (ref 3.5–5.2)
PROT SERPL-MCNC: 6.7 G/DL (ref 6–8.5)
RBC # BLD AUTO: 4.6 X10E6/UL (ref 3.77–5.28)
SODIUM SERPL-SCNC: 140 MMOL/L (ref 134–144)
TRIGL SERPL-MCNC: 98 MG/DL (ref 0–149)
TSH SERPL DL<=0.005 MIU/L-ACNC: 1.73 UIU/ML (ref 0.45–4.5)
VLDLC SERPL CALC-MCNC: 20 MG/DL (ref 5–40)
WBC # BLD AUTO: 5.2 X10E3/UL (ref 3.4–10.8)

## 2017-06-28 NOTE — TELEPHONE ENCOUNTER
Pt requesting a return call from the nurse. States she was seen in the office yesterday and has several f/u questions.  Please call [phone]

## 2017-06-28 NOTE — TELEPHONE ENCOUNTER
Called pt back. She had questions regarding her visit yesterday. Answered all pts questions. She was very thankful for my help. Pt states it says in her note that she has lower back pain but its more her upper back. She wanted me to make note of that.

## 2017-07-14 ENCOUNTER — TELEPHONE (OUTPATIENT)
Dept: INTERNAL MEDICINE CLINIC | Age: 77
End: 2017-07-14

## 2017-07-14 ENCOUNTER — TELEPHONE (OUTPATIENT)
Dept: OBGYN CLINIC | Age: 77
End: 2017-07-14

## 2017-07-14 NOTE — TELEPHONE ENCOUNTER
Pt has an xray on a disc and she would like a callback to discuss what needs to be done for Dr. Rodney Jones to have the x-ray and get her feedback.

## 2017-07-14 NOTE — TELEPHONE ENCOUNTER
Call received at 9:19am      78year old patient last seen in the office on 5/4/17. Patient is calling to find out who you would recommend for an endocrinologist that is in the [employer].       Please advise

## 2017-07-14 NOTE — TELEPHONE ENCOUNTER
[facility] [phone] is located in [place].   ([facility] are in [place])

## 2017-07-17 ENCOUNTER — TELEPHONE (OUTPATIENT)
Dept: INTERNAL MEDICINE CLINIC | Age: 77
End: 2017-07-17

## 2017-07-17 NOTE — TELEPHONE ENCOUNTER
I called pt, she states she went to see an osteopathic doctor. Dr. Livan Castro for back pain. He did an x-ray and she would like to get Dr. Cecilia Ballard opinion. Asked pt to bring in her her last office note and the interpretation of the x-ray and make an appt with pcp. Advised pt since it wasn't done at a [facility] facility we don't have the software to view the disc. Pt understood. She will talk it over with her  and call back.

## 2017-07-19 DIAGNOSIS — Z78.0 POST-MENOPAUSE: Primary | ICD-10-CM

## 2017-07-19 DIAGNOSIS — M54.50 CHRONIC BILATERAL LOW BACK PAIN WITHOUT SCIATICA: ICD-10-CM

## 2017-07-19 DIAGNOSIS — G89.29 CHRONIC BILATERAL LOW BACK PAIN WITHOUT SCIATICA: ICD-10-CM

## 2017-07-21 DIAGNOSIS — M54.6 ACUTE BILATERAL THORACIC BACK PAIN: Primary | ICD-10-CM

## 2017-07-27 ENCOUNTER — HOSPITAL ENCOUNTER (OUTPATIENT)
Dept: MAMMOGRAPHY | Age: 77
Discharge: HOME | End: 2017-07-27
Attending: INTERNAL MEDICINE
Payer: MEDICARE

## 2017-07-27 DIAGNOSIS — Z12.39 BREAST CANCER SCREENING: ICD-10-CM

## 2017-07-27 PROCEDURE — 77067 SCR MAMMO BI INCL CAD: CPT

## 2017-07-28 ENCOUNTER — HOSPITAL ENCOUNTER (OUTPATIENT)
Dept: MAMMOGRAPHY | Age: 77
Discharge: HOME | End: 2017-07-28
Attending: INTERNAL MEDICINE
Payer: MEDICARE

## 2017-07-28 ENCOUNTER — HOSPITAL ENCOUNTER (OUTPATIENT)
Dept: GENERAL RADIOLOGY | Age: 77
Discharge: HOME | End: 2017-07-28
Attending: INTERNAL MEDICINE
Payer: MEDICARE

## 2017-07-28 DIAGNOSIS — Z78.0 POST-MENOPAUSE: ICD-10-CM

## 2017-07-28 DIAGNOSIS — G89.29 CHRONIC BILATERAL LOW BACK PAIN WITHOUT SCIATICA: ICD-10-CM

## 2017-07-28 DIAGNOSIS — M54.6 ACUTE BILATERAL THORACIC BACK PAIN: ICD-10-CM

## 2017-07-28 DIAGNOSIS — M54.50 CHRONIC BILATERAL LOW BACK PAIN WITHOUT SCIATICA: ICD-10-CM

## 2017-07-28 PROCEDURE — 72220 X-RAY EXAM SACRUM TAILBONE: CPT

## 2017-07-28 PROCEDURE — 72072 X-RAY EXAM THORAC SPINE 3VWS: CPT

## 2017-07-28 PROCEDURE — 77080 DXA BONE DENSITY AXIAL: CPT

## 2017-07-28 PROCEDURE — 72100 X-RAY EXAM L-S SPINE 2/3 VWS: CPT

## 2017-08-01 ENCOUNTER — OFFICE VISIT (OUTPATIENT)
Dept: INTERNAL MEDICINE CLINIC | Age: 77
End: 2017-08-01

## 2017-08-01 VITALS
WEIGHT: 153 LBS | DIASTOLIC BLOOD PRESSURE: 87 MMHG | HEART RATE: 66 BPM | BODY MASS INDEX: 25.49 KG/M2 | RESPIRATION RATE: 16 BRPM | TEMPERATURE: 98.1 F | OXYGEN SATURATION: 99 % | SYSTOLIC BLOOD PRESSURE: 160 MMHG | HEIGHT: 65 IN

## 2017-08-01 DIAGNOSIS — I10 ESSENTIAL HYPERTENSION: ICD-10-CM

## 2017-08-01 DIAGNOSIS — Z71.89 ADVANCED CARE PLANNING/COUNSELING DISCUSSION: ICD-10-CM

## 2017-08-01 DIAGNOSIS — M54.6 CHRONIC BILATERAL THORACIC BACK PAIN: Primary | ICD-10-CM

## 2017-08-01 DIAGNOSIS — G89.29 CHRONIC BILATERAL THORACIC BACK PAIN: ICD-10-CM

## 2017-08-01 DIAGNOSIS — G89.29 CHRONIC BILATERAL THORACIC BACK PAIN: Primary | ICD-10-CM

## 2017-08-01 DIAGNOSIS — Z13.39 SCREENING FOR ALCOHOLISM: ICD-10-CM

## 2017-08-01 DIAGNOSIS — Z00.00 MEDICARE ANNUAL WELLNESS VISIT, SUBSEQUENT: Primary | ICD-10-CM

## 2017-08-01 DIAGNOSIS — Z13.31 SCREENING FOR DEPRESSION: ICD-10-CM

## 2017-08-01 DIAGNOSIS — Z00.00 ROUTINE GENERAL MEDICAL EXAMINATION AT A HEALTH CARE FACILITY: ICD-10-CM

## 2017-08-01 DIAGNOSIS — M54.6 CHRONIC BILATERAL THORACIC BACK PAIN: ICD-10-CM

## 2017-08-01 RX ORDER — LOSARTAN POTASSIUM 50 MG/1
50 TABLET ORAL DAILY
Qty: 30 TAB | Refills: 1 | Status: SHIPPED | OUTPATIENT
Start: 2017-08-01 | End: 2017-08-18 | Stop reason: ALTCHOICE

## 2017-08-01 NOTE — MR AVS SNAPSHOT
Visit Information Date & Time Provider Department Dept. Phone Encounter #  
 8/1/2017 11:00 AM Lashon Gurrola MD [facility] [address] [facility] [id number] Your Appointments 9/21/2017  8:45 AM  
COMPLETE PHYSICAL with Lashon Gurrola MD  
[facility] [address]) Appt Note: CPE  $15CP $0PB sc 06/01/17 [address] [place] [facility] [address]  
[phone]  
  
   
 [address] [place] [facility] [address] Upcoming Health Maintenance Date Due FOBT Q 1 YEAR AGE 50-75 11/3/1992 MEDICARE YEARLY EXAM 11/3/2007 Pneumococcal 65+ Low/Medium Risk (2 of 2 - PCV13) 1/17/2014 INFLUENZA AGE 9 TO ADULT 8/1/2017 GLAUCOMA SCREENING Q2Y 1/15/2019 DTaP/Tdap/Td series (2 - Td) 1/17/2023 Allergies as of 8/1/2017  Review Complete On: 8/1/2017 By: Lashon Gurrola MD  
 No Known Allergies Current Immunizations  Never Reviewed Name Date Pneumococcal Polysaccharide (PPSV-23) 1/17/2013 Tdap 1/17/2013 Zoster Vaccine, Live 7/28/2016 Not reviewed this visit You Were Diagnosed With   
  
 Codes Comments Essential hypertension    -  Primary ICD-10-CM: I10 
ICD-9-CM: 401.9 Vitals BP Pulse Temp Resp Height(growth percentile) Weight(growth percentile) 160/87 (BP 1 Location: Left arm, BP Patient Position: Sitting) 66 98.1 °F (36.7 °C) (Oral) 16 5' 5\" (1.651 m) 153 lb (69.4 kg) SpO2 BMI OB Status Smoking Status 99% 25.46 kg/m2 Postmenopausal Never Smoker Vitals History BMI and BSA Data Body Mass Index Body Surface Area  
 25.46 kg/m 2 1.78 m 2 Preferred Pharmacy Pharmacy Name Phone [facility] [id number] [facility], [address] [place] [address], [address] [phone] Your Updated Medication List  
  
   
This list is accurate as of: 8/1/17 12:28 PM.  Always use your most recent med list.  
  
  
  
  
 dorzolamide-timolol 22.3-6.8 mg/mL ophthalmic solution Commonly known as:  COSOPT  
  
 latanoprost 0.005 % ophthalmic solution Commonly known as:  XALATAN  
  
 losartan 50 mg tablet Commonly known as:  COZAAR Take 1 Tab by mouth daily. VITAMIN D3 2,000 unit Tab Generic drug:  cholecalciferol (vitamin D3) Take  by mouth. Prescriptions Sent to Pharmacy Refills  
 losartan (COZAAR) 50 mg tablet 1 Sig: Take 1 Tab by mouth daily. Class: Normal  
 Pharmacy: [facility] [address] [place] [address], [address] [place] [address] #: [phone] Route: Oral  
  
Patient Instructions High Blood Pressure: Care Instructions Your Care Instructions If your blood pressure is usually above 140/90, you have high blood pressure, or hypertension. That means the top number is 140 or higher or the bottom number is 90 or higher, or both. Despite what a lot of people think, high blood pressure usually doesn't cause headaches or make you feel dizzy or lightheaded. It usually has no symptoms. But it does increase your risk for heart attack, stroke, and kidney or eye damage. The higher your blood pressure, the more your risk increases. Your doctor will give you a goal for your blood pressure. Your goal will be based on your health and your age. An example of a goal is to keep your blood pressure below 140/90. Lifestyle changes, such as eating healthy and being active, are always important to help lower blood pressure. You might also take medicine to reach your blood pressure goal. 
Follow-up care is a key part of your treatment and safety. Be sure to make and go to all appointments, and call your doctor if you are having problems. It's also a good idea to know your test results and keep a list of the medicines you take. How can you care for yourself at home? Medical treatment · If you stop taking your medicine, your blood pressure will go back up. You may take one or more types of medicine to lower your blood pressure. Be safe with medicines. Take your medicine exactly as prescribed. Call your doctor if you think you are having a problem with your medicine. · Talk to your doctor before you start taking aspirin every day. Aspirin can help certain people lower their risk of a heart attack or stroke. But taking aspirin isn't right for everyone, because it can cause serious bleeding. · See your doctor regularly. You may need to see the doctor more often at first or until your blood pressure comes down. · If you are taking blood pressure medicine, talk to your doctor before you take decongestants or anti-inflammatory medicine, such as ibuprofen. Some of these medicines can raise blood pressure. · Learn how to check your blood pressure at home. Lifestyle changes · Stay at a healthy weight. This is especially important if you put on weight around the waist. Losing even 10 pounds can help you lower your blood pressure. · If your doctor recommends it, get more exercise. Walking is a good choice. Bit by bit, increase the amount you walk every day. Try for at least 30 minutes on most days of the week. You also may want to swim, bike, or do other activities. · Avoid or limit alcohol. Talk to your doctor about whether you can drink any alcohol. · Try to limit how much sodium you eat to less than 2,300 milligrams (mg) a day. Your doctor may ask you to try to eat less than 1,500 mg a day. · Eat plenty of fruits (such as bananas and oranges), vegetables, legumes, whole grains, and low-fat dairy products. · Lower the amount of saturated fat in your diet. Saturated fat is found in animal products such as milk, cheese, and meat. Limiting these foods may help you lose weight and also lower your risk for heart disease. · Do not smoke. Smoking increases your risk for heart attack and stroke.  If you need help quitting, talk to your doctor about stop-smoking programs and medicines. These can increase your chances of quitting for good. When should you call for help? Call 911 anytime you think you may need emergency care. This may mean having symptoms that suggest that your blood pressure is causing a serious heart or blood vessel problem. Your blood pressure may be over 180/110. For example, call 911 if: 
· You have symptoms of a heart attack. These may include: ¨ Chest pain or pressure, or a strange feeling in the chest. 
¨ Sweating. ¨ Shortness of breath. ¨ Nausea or vomiting. ¨ Pain, pressure, or a strange feeling in the back, neck, jaw, or upper belly or in one or both shoulders or arms. ¨ Lightheadedness or sudden weakness. ¨ A fast or irregular heartbeat. · You have symptoms of a stroke. These may include: 
¨ Sudden numbness, tingling, weakness, or loss of movement in your face, arm, or leg, especially on only one side of your body. ¨ Sudden vision changes. ¨ Sudden trouble speaking. ¨ Sudden confusion or trouble understanding simple statements. ¨ Sudden problems with walking or balance. ¨ A sudden, severe headache that is different from past headaches. · You have severe back or belly pain. Do not wait until your blood pressure comes down on its own. Get help right away. Call your doctor now or seek immediate care if: 
· Your blood pressure is much higher than normal (such as 180/110 or higher), but you don't have symptoms. · You think high blood pressure is causing symptoms, such as: ¨ Severe headache. ¨ Blurry vision. Watch closely for changes in your health, and be sure to contact your doctor if: 
· Your blood pressure measures 140/90 or higher at least 2 times. That means the top number is 140 or higher or the bottom number is 90 or higher, or both. · You think you may be having side effects from your blood pressure medicine. · Your blood pressure is usually normal, but it goes above normal at least 2 times. Where can you learn more? Go to http://jayne-neelima.info/. Enter R957 in the search box to learn more about \"High Blood Pressure: Care Instructions. \" Current as of: August 8, 2016 Content Version: 11.3 © 2565-2627 [employer]. Care instructions adapted under license by [employer] (which disclaims liability or warranty for this information). If you have questions about a medical condition or this instruction, always ask your healthcare professional. [address] any warranty or liability for your use of this information. Duloxetine (By mouth) Duloxetine (doo-LOX-e-teen) Treats depression, anxiety, diabetic peripheral neuropathy, fibromyalgia, and chronic muscle or bone pain. This medicine is an SSNRI. Brand Name(s): Cymbalta, DermacinRx DPN Laly Fulton There may be other brand names for this medicine. When This Medicine Should Not Be Used: This medicine is not right for everyone. Do not use it if you had an allergic reaction to duloxetine. How to Use This Medicine:  
Capsule, Delayed Release Capsule · Take your medicine as directed. Your dose may need to be changed several times to find what works best for you. · Delayed-release capsule: Swallow the capsule whole. Do not crush, chew, break, or open it. · This medicine should come with a Medication Guide. Ask your pharmacist for a copy if you do not have one. · Missed dose: Take a dose as soon as you remember. If it is almost time for your next dose, wait until then and take a regular dose. Do not take extra medicine to make up for a missed dose. · Store the medicine in a closed container at room temperature, away from heat, moisture, and direct light. Drugs and Foods to Avoid: Ask your doctor or pharmacist before using any other medicine, including over-the-counter medicines, vitamins, and herbal products.  
· Do not take duloxetine if you have used an MAO inhibitor (MAOI) within the past 14 days. Do not start taking an MAO inhibitor within 5 days of stopping duloxetine. · Some medicines can affect how duloxetine works. Tell your doctor if you are using any of the following: 
¨ Buspirone, cimetidine, ciprofloxacin, enoxacin, fentanyl, lithium, Rohit's wort, theophylline, tramadol, tryptophan, or warfarin ¨ Amphetamines ¨ Blood pressure medicine ¨ Diuretic (water pill) ¨ Medicine for heart rhythm problems (including flecainide, propafenone, quinidine) ¨ Medicine to treat migraine headaches (including triptans) ¨ NSAID pain or arthritis medicine (including aspirin, celecoxib, diclofenac, ibuprofen, naproxen) ¨ Other medicine to treat depression or mood disorders (including amitriptyline, desipramine, fluoxetine, imipramine, nortriptyline, paroxetine) ¨ Phenothiazine medicine (including thioridazine) · Tell your doctor if you use anything else that makes you sleepy. Some examples are allergy medicine, narcotic pain medicine, and alcohol. · Do not drink alcohol while you are using this medicine. Warnings While Using This Medicine: · Tell your doctor if you are pregnant or breastfeeding, or if you have kidney disease, liver disease, diabetes, digestion problems, glaucoma, heart disease, high or low blood pressure, or problems with urination. Tell your doctor if you smoke or you have a history of seizures, or drug or alcohol addiction. · This medicine may cause the following problems:  
¨ Serious liver problems ¨ Serotonin syndrome (more likely when used with certain other medicines) ¨ Increased risk of bleeding problems ¨ Serious skin reactions ¨ Low sodium levels in the blood · This medicine can increase thoughts of suicide. Tell your doctor right away if you start to feel depressed and have thoughts about hurting yourself. · This medicine can cause changes in your blood pressure. This may make you dizzy or drowsy.  Do not drive or do anything that could be dangerous until you know how this medicine affects you. Stand up slowly to avoid falls. · Do not stop using this medicine suddenly. Your doctor will need to slowly decrease your dose before you stop it completely. · Your doctor will check your progress and the effects of this medicine at regular visits. Keep all appointments. · Keep all medicine out of the reach of children. Never share your medicine with anyone. Possible Side Effects While Using This Medicine:  
Call your doctor right away if you notice any of these side effects: · Allergic reaction: Itching or hives, swelling in your face or hands, swelling or tingling in your mouth or throat, chest tightness, trouble breathing · Anxiety, restlessness, fever, fast heartbeat, sweating, muscle spasms, diarrhea, seeing or hearing things that are not there · Blistering, peeling, red skin rash · Confusion, weakness, muscle twitching · Dark urine or pale stools, nausea, vomiting, loss of appetite, stomach pain, yellow skin or eyes · Decrease in how much or how often you urinate · Eye pain, vision changes, seeing halos around lights · Feeling more energetic than usual 
· Lightheadedness, dizziness, or fainting · Unusual moods or behaviors, worsening depression, thoughts about hurting yourself, trouble sleeping · Unusual bleeding or bruising If you notice these less serious side effects, talk with your doctor: · Decrease in appetite or weight · Dry mouth, constipation, mild nausea · Unusual drowsiness, sleepiness, or tiredness If you notice other side effects that you think are caused by this medicine, tell your doctor. Call your doctor for medical advice about side effects. You may report side effects to FDA at 5-835-FDA-1115 © 2017 [facility] Information is for End User's use only and may not be sold, redistributed or otherwise used for commercial purposes. The above information is an  only.  It is not intended as medical advice for individual conditions or treatments. Talk to your doctor, nurse or pharmacist before following any medical regimen to see if it is safe and effective for you. Compression Fracture of the Spine: Care Instructions Your Care Instructions A compression fracture happens when the front part of a spinal bone breaks and collapses. A fall or other accident can cause it. A minor injury or moving the wrong way can cause a break if you have thin or brittle bones (osteoporosis). These types of breaks will heal in 8 to 10 weeks. You will need rest and pain medicines. Your doctor may recommend physical therapy. Some doctors recommend that certain people with compression fractures wear braces. Your doctor also may treat thin or brittle bones. You may need surgery if you have lasting pain or if the bone presses on the spinal cord or nerves. You heal best when you take good care of yourself. Eat a variety of healthy foods, and don't smoke. Follow-up care is a key part of your treatment and safety. Be sure to make and go to all appointments, and call your doctor if you are having problems. It's also a good idea to know your test results and keep a list of the medicines you take. How can you care for yourself at home? · Be safe with medicines. Read and follow all instructions on the label. ¨ If the doctor gave you a prescription medicine for pain, take it as prescribed. ¨ If you are not taking a prescription pain medicine, ask your doctor if you can take an over-the-counter medicine. · Talk to your doctor about how to make your bones stronger. You may need medicines or a change in what you eat. · Be active only as directed by your doctor. When should you call for help? Call 911 anytime you think you may need emergency care. For example, call if: 
· You are unable to move an arm or a leg at all. Call your doctor now or seek immediate medical care if: · You have new or worse symptoms in your arms, chest, legs, belly, or buttocks. Symptoms may include: ¨ Numbness or tingling. ¨ Weakness. ¨ Pain. · You lose bladder or bowel control. · You have belly pain, bloating, vomiting, or nausea. Watch closely for changes in your health, and be sure to contact your doctor if: 
· You are not getting better as expected. Where can you learn more? Go to http://jayne-neelima.info/. Enter P445 in the search box to learn more about \"Compression Fracture of the Spine: Care Instructions. \" Current as of: March 21, 2017 Content Version: 11.3 © 6458-9585 [employer]. Care instructions adapted under license by [employer] (which disclaims liability or warranty for this information). If you have questions about a medical condition or this instruction, always ask your healthcare professional. [address] any warranty or liability for your use of this information. Introducing [facility]! Dear Elda Alva: Thank you for requesting a GeckoGo account. Our records indicate that you already have an active GeckoGo account. You can access your account anytime at [email or web address] Did you know that you can access your hospital and ER discharge instructions at any time in GeckoGo? You can also review all of your test results from your hospital stay or ER visit. Additional Information If you have questions, please visit the Frequently Asked Questions section of the GeckoGo website at [email or web address] Remember, GeckoGo is NOT to be used for urgent needs. For medical emergencies, dial 911. Now available from your iPhone and Android! Please provide this summary of care documentation to your next provider. Your primary care clinician is listed as Ana Rosa Ozuna. If you have any questions after today's visit, please call [phone].

## 2017-08-01 NOTE — PROGRESS NOTES
Tiarra Lawrence is a 78 y.o. female and presents with Back Pain (on and off but not today)  . Subjective:  She came to [facility] to be near her daughters but both daughters moved. Back pain  Melatonin is helping her sleep  xrays reviewed with pt with disucssion of + compression fractures  Was seen by Dr. Pardeep Alonso tried to inject but did not improve  She has been seeing physical therapy several sessions in [facility]  She has been on pain medication, nucenta but not able to continue with rx due to cost and felt she was addicted to it  Not intensted in surgery  Floor exercises and sleeping on side are helpful  Does not hurt if sitting down or lying down  Most painful if standing severe pain after 5 min of standing still, this is chronic  Some nights sleeps well but other nights not well rested. Glaucoma  Followed by dr. Corwin Mccollum    Review of Systems  Constitutional: negative for fevers, chills, anorexia and weight loss  Eyes:   negative for visual disturbance and irritation  ENT:   negative for tinnitus,sore throat,nasal congestion,ear pains. hoarseness  Respiratory:  negative for cough, hemoptysis, dyspnea,wheezing  CV:   negative for chest pain, palpitations, lower extremity edema  GI:   negative for nausea, vomiting, diarrhea, abdominal pain,melena  Endo:               negative for polyuria,polydipsia,polyphagia,heat intolerance  Genitourinary: negative for frequency, dysuria and hematuria  Integument:  negative for rash and pruritus  Hematologic:  negative for easy bruising and gum/nose bleeding  Musculoskel: negative for myalgias, arthralgias, back pain, muscle weakness, joint pain  Neurological:  negative for headaches, dizziness, vertigo, memory problems and gait   Behavl/Psych: negative for feelings of anxiety, depression, mood changes    Past Medical History:   Diagnosis Date    Glaucoma     Dr. Ramona Boothe    Pap smear for cervical cancer screening     2009 neg 2010 neg 2012 neg     Past Surgical History: Procedure Laterality Date    HX BREAST BIOPSY Left yrs ago    neg    HX TONSILLECTOMY       Social History     Social History    Marital status:      Spouse name: N/A    Number of children: N/A    Years of education: N/A     Social History Main Topics    Smoking status: Never Smoker    Smokeless tobacco: Never Used    Alcohol use Yes      Comment: rarely    Drug use: No    Sexual activity: Yes     Partners: Male     Birth control/ protection: None     Other Topics Concern    None     Social History Narrative    Retired from [employer]         to  healthy    2 children daughters : healthy    2 grandson's : healthy     Family History   Problem Relation Age of Onset    No Known Problems Mother      Current Outpatient Prescriptions   Medication Sig Dispense Refill    dorzolamide-timolol (COSOPT) 22.3-6.8 mg/mL ophthalmic solution       latanoprost (XALATAN) 0.005 % ophthalmic solution       cholecalciferol, vitamin D3, (VITAMIN D3) 2,000 unit tab Take  by mouth.        No Known Allergies    Objective:  Visit Vitals    /87 (BP 1 Location: Left arm, BP Patient Position: Sitting)    Pulse 66    Temp 98.1 °F (36.7 °C) (Oral)    Resp 16    Ht 5' 5\" (1.651 m)    Wt 153 lb (69.4 kg)    SpO2 99%    BMI 25.46 kg/m2     Physical Exam: repeat bp 166/90  General appearance - alert, well appearing, and in no distress  Mental status - alert, oriented to person, place, and time  EYE-CHENG, EOMI, corneas normal, no foreign bodies, visual acuity normal both eyes, no periorbital cellulitis  ENT-ENT exam normal, no neck nodes or sinus tenderness  Nose - normal and patent, no erythema, discharge or polyps  Mouth - mucous membranes moist, pharynx normal without lesions  Neck - supple, no significant adenopathy   Chest - clear to auscultation, no wheezes, rales or rhonchi, symmetric air entry   Heart - normal rate, regular rhythm, normal S1, S2, no murmurs, rubs, clicks or gallops   Abdomen - soft, nontender, nondistended, no masses or organomegaly  Lymph- no adenopathy palpable  Ext-peripheral pulses normal, no pedal edema, no clubbing or cyanosis  Skin-Warm and dry. no hyperpigmentation, vitiligo, or suspicious lesions  Neuro -alert, oriented, normal speech, no focal findings or movement disorder noted  Neck-normal C-spine, no tenderness, full ROM without pain      Results for orders placed or performed in visit on 61/45/30   METABOLIC PANEL, COMPREHENSIVE   Result Value Ref Range    Glucose 83 65 - 99 mg/dL    BUN 25 8 - 27 mg/dL    Creatinine 0.72 0.57 - 1.00 mg/dL    GFR est non-AA 83 >59 mL/min/1.73    GFR est AA 95 >59 mL/min/1.73    BUN/Creatinine ratio 35 (H) 12 - 28    Sodium 140 134 - 144 mmol/L    Potassium 4.6 3.5 - 5.2 mmol/L    Chloride 99 96 - 106 mmol/L    CO2 24 18 - 29 mmol/L    Calcium 9.4 8.7 - 10.3 mg/dL    Protein, total 6.7 6.0 - 8.5 g/dL    Albumin 4.1 3.5 - 4.8 g/dL    GLOBULIN, TOTAL 2.6 1.5 - 4.5 g/dL    A-G Ratio 1.6 1.2 - 2.2    Bilirubin, total 0.4 0.0 - 1.2 mg/dL    Alk.  phosphatase 50 39 - 117 IU/L    AST (SGOT) 17 0 - 40 IU/L    ALT (SGPT) 11 0 - 32 IU/L   CBC W/O DIFF   Result Value Ref Range    WBC 5.2 3.4 - 10.8 x10E3/uL    RBC 4.60 3.77 - 5.28 x10E6/uL    HGB 14.3 11.1 - 15.9 g/dL    HCT 41.2 34.0 - 46.6 %    MCV 90 79 - 97 fL    MCH 31.1 26.6 - 33.0 pg    MCHC 34.7 31.5 - 35.7 g/dL    RDW 14.3 12.3 - 15.4 %    PLATELET 266 346 - 483 x10E3/uL   LIPID PANEL   Result Value Ref Range    Cholesterol, total 192 100 - 199 mg/dL    Triglyceride 98 0 - 149 mg/dL    HDL Cholesterol 74 >39 mg/dL    VLDL, calculated 20 5 - 40 mg/dL    LDL, calculated 98 0 - 99 mg/dL   HEMOGLOBIN A1C   Result Value Ref Range    Hemoglobin A1c 4.8 4.8 - 5.6 %    Estimated average glucose 91 mg/dL   TSH 3RD GENERATION   Result Value Ref Range    TSH 1.730 0.450 - 4.500 uIU/mL   VITAMIN D, 25 HYDROXY   Result Value Ref Range    VITAMIN D, 25-HYDROXY 48.4 30.0 - 100.0 ng/mL   FERRITIN   Result Value Ref Range    Ferritin 131 15 - 150 ng/mL   CVD REPORT   Result Value Ref Range    INTERPRETATION Note      Prevention    Cardiovascular profile  Family hx  Exercising:  exercisng in yard  Blood pressure:  Health healthy diet:  Diabetes:  Cholesterol:  Renal function:      Cancer risk profile  Mammogram 2016, Dr. Sewell Head  Colonoscopy 2015 done  Skin nonhealing in 2 weeks dermatology Dr. Zarina Daniels abnormal bleeding/discharge/abd pain/pressure ana rosa michael      Thyroid sx    Osteopenia prevention  Calcium 1000mg/day yes  Vitamin D 800iu/day yes    Mental health scale:  Very good but feels memory compromsised  Depression  Anxiety  Sleep # of hours:  Energy Level:        Immunizations defers all vaccines because had bad event with injection several years ago 2015 6 months required PT to stop tendonitis  TDAP  Pneumonia vaccine  Flu vaccine  Shingles vaccine  HPV      Diagnoses and all orders for this visit:    1. Essential hypertension  Appears to be new/recurrence  Will start losartan at 25 mg but advance to 50 mg if no se of lightheadedness    2. Chronic bilateral thoracic back pain  Will get PT name: Jim Hassan, [phone]  so pt can possible get reevaluated, possible TENS    Will consider gabapentin if needed. [facility]  I wrote everything down for PT  Will monitor  Uncontrolled htn can aggravate vascular dementia      I spent 25 min with this new pt and >50% of the time was spent in management and counseling of pt re: management of back pain without pain medication. Will work on sleep which improved. She is interested in exercises with weights to see if this will help        She did mention her memory was decreasing and we discussed possibly doing a SLUMS test at her next visit if still needed.  shawn Bryant next visit

## 2017-08-01 NOTE — PATIENT INSTRUCTIONS
High Blood Pressure: Care Instructions  Your Care Instructions  If your blood pressure is usually above 140/90, you have high blood pressure, or hypertension. That means the top number is 140 or higher or the bottom number is 90 or higher, or both. Despite what a lot of people think, high blood pressure usually doesn't cause headaches or make you feel dizzy or lightheaded. It usually has no symptoms. But it does increase your risk for heart attack, stroke, and kidney or eye damage. The higher your blood pressure, the more your risk increases. Your doctor will give you a goal for your blood pressure. Your goal will be based on your health and your age. An example of a goal is to keep your blood pressure below 140/90. Lifestyle changes, such as eating healthy and being active, are always important to help lower blood pressure. You might also take medicine to reach your blood pressure goal.  Follow-up care is a key part of your treatment and safety. Be sure to make and go to all appointments, and call your doctor if you are having problems. It's also a good idea to know your test results and keep a list of the medicines you take. How can you care for yourself at home? Medical treatment  · If you stop taking your medicine, your blood pressure will go back up. You may take one or more types of medicine to lower your blood pressure. Be safe with medicines. Take your medicine exactly as prescribed. Call your doctor if you think you are having a problem with your medicine. · Talk to your doctor before you start taking aspirin every day. Aspirin can help certain people lower their risk of a heart attack or stroke. But taking aspirin isn't right for everyone, because it can cause serious bleeding. · See your doctor regularly. You may need to see the doctor more often at first or until your blood pressure comes down.   · If you are taking blood pressure medicine, talk to your doctor before you take decongestants or anti-inflammatory medicine, such as ibuprofen. Some of these medicines can raise blood pressure. · Learn how to check your blood pressure at home. Lifestyle changes  · Stay at a healthy weight. This is especially important if you put on weight around the waist. Losing even 10 pounds can help you lower your blood pressure. · If your doctor recommends it, get more exercise. Walking is a good choice. Bit by bit, increase the amount you walk every day. Try for at least 30 minutes on most days of the week. You also may want to swim, bike, or do other activities. · Avoid or limit alcohol. Talk to your doctor about whether you can drink any alcohol. · Try to limit how much sodium you eat to less than 2,300 milligrams (mg) a day. Your doctor may ask you to try to eat less than 1,500 mg a day. · Eat plenty of fruits (such as bananas and oranges), vegetables, legumes, whole grains, and low-fat dairy products. · Lower the amount of saturated fat in your diet. Saturated fat is found in animal products such as milk, cheese, and meat. Limiting these foods may help you lose weight and also lower your risk for heart disease. · Do not smoke. Smoking increases your risk for heart attack and stroke. If you need help quitting, talk to your doctor about stop-smoking programs and medicines. These can increase your chances of quitting for good. When should you call for help? Call 911 anytime you think you may need emergency care. This may mean having symptoms that suggest that your blood pressure is causing a serious heart or blood vessel problem. Your blood pressure may be over 180/110. For example, call 911 if:  · You have symptoms of a heart attack. These may include:  ¨ Chest pain or pressure, or a strange feeling in the chest.  ¨ Sweating. ¨ Shortness of breath. ¨ Nausea or vomiting. ¨ Pain, pressure, or a strange feeling in the back, neck, jaw, or upper belly or in one or both shoulders or arms.   ¨ Lightheadedness or sudden weakness. ¨ A fast or irregular heartbeat. · You have symptoms of a stroke. These may include:  ¨ Sudden numbness, tingling, weakness, or loss of movement in your face, arm, or leg, especially on only one side of your body. ¨ Sudden vision changes. ¨ Sudden trouble speaking. ¨ Sudden confusion or trouble understanding simple statements. ¨ Sudden problems with walking or balance. ¨ A sudden, severe headache that is different from past headaches. · You have severe back or belly pain. Do not wait until your blood pressure comes down on its own. Get help right away. Call your doctor now or seek immediate care if:  · Your blood pressure is much higher than normal (such as 180/110 or higher), but you don't have symptoms. · You think high blood pressure is causing symptoms, such as:  ¨ Severe headache. ¨ Blurry vision. Watch closely for changes in your health, and be sure to contact your doctor if:  · Your blood pressure measures 140/90 or higher at least 2 times. That means the top number is 140 or higher or the bottom number is 90 or higher, or both. · You think you may be having side effects from your blood pressure medicine. · Your blood pressure is usually normal, but it goes above normal at least 2 times. Where can you learn more? Go to http://jayne-neelima.info/. Enter K311 in the search box to learn more about \"High Blood Pressure: Care Instructions. \"  Current as of: August 8, 2016  Content Version: 11.3  © 7890-8204 [employer]. Care instructions adapted under license by [employer] (which disclaims liability or warranty for this information). If you have questions about a medical condition or this instruction, always ask your healthcare professional. [place] [address] any warranty or liability for your use of this information.   Duloxetine (By mouth)   Duloxetine (doo-LOX-[id number]-teen)  Treats depression, anxiety, diabetic peripheral neuropathy, fibromyalgia, and chronic muscle or bone pain. This medicine is an SSNRI. Brand Name(s): Cymbalta, DermacinRx Reginald Worley   There may be other brand names for this medicine. When This Medicine Should Not Be Used: This medicine is not right for everyone. Do not use it if you had an allergic reaction to duloxetine. How to Use This Medicine:   Capsule, Delayed Release Capsule  · Take your medicine as directed. Your dose may need to be changed several times to find what works best for you. · Delayed-release capsule: Swallow the capsule whole. Do not crush, chew, break, or open it. · This medicine should come with a Medication Guide. Ask your pharmacist for a copy if you do not have one. · Missed dose: Take a dose as soon as you remember. If it is almost time for your next dose, wait until then and take a regular dose. Do not take extra medicine to make up for a missed dose. · Store the medicine in a closed container at room temperature, away from heat, moisture, and direct light. Drugs and Foods to Avoid:   Ask your doctor or pharmacist before using any other medicine, including over-the-counter medicines, vitamins, and herbal products. · Do not take duloxetine if you have used an MAO inhibitor (MAOI) within the past 14 days. Do not start taking an MAO inhibitor within 5 days of stopping duloxetine. · Some medicines can affect how duloxetine works.  Tell your doctor if you are using any of the following:  ¨ Buspirone, cimetidine, ciprofloxacin, enoxacin, fentanyl, lithium, Rohit's wort, theophylline, tramadol, tryptophan, or warfarin  ¨ Amphetamines  ¨ Blood pressure medicine  ¨ Diuretic (water pill)  ¨ Medicine for heart rhythm problems (including flecainide, propafenone, quinidine)  ¨ Medicine to treat migraine headaches (including triptans)  ¨ NSAID pain or arthritis medicine (including aspirin, celecoxib, diclofenac, ibuprofen, naproxen)  ¨ Other medicine to treat depression or mood disorders (including amitriptyline, desipramine, fluoxetine, imipramine, nortriptyline, paroxetine)  ¨ Phenothiazine medicine (including thioridazine)  · Tell your doctor if you use anything else that makes you sleepy. Some examples are allergy medicine, narcotic pain medicine, and alcohol. · Do not drink alcohol while you are using this medicine. Warnings While Using This Medicine:   · Tell your doctor if you are pregnant or breastfeeding, or if you have kidney disease, liver disease, diabetes, digestion problems, glaucoma, heart disease, high or low blood pressure, or problems with urination. Tell your doctor if you smoke or you have a history of seizures, or drug or alcohol addiction. · This medicine may cause the following problems:   ¨ Serious liver problems  ¨ Serotonin syndrome (more likely when used with certain other medicines)  ¨ Increased risk of bleeding problems  ¨ Serious skin reactions  ¨ Low sodium levels in the blood  · This medicine can increase thoughts of suicide. Tell your doctor right away if you start to feel depressed and have thoughts about hurting yourself. · This medicine can cause changes in your blood pressure. This may make you dizzy or drowsy. Do not drive or do anything that could be dangerous until you know how this medicine affects you. Stand up slowly to avoid falls. · Do not stop using this medicine suddenly. Your doctor will need to slowly decrease your dose before you stop it completely. · Your doctor will check your progress and the effects of this medicine at regular visits. Keep all appointments. · Keep all medicine out of the reach of children. Never share your medicine with anyone.   Possible Side Effects While Using This Medicine:   Call your doctor right away if you notice any of these side effects:  · Allergic reaction: Itching or hives, swelling in your face or hands, swelling or tingling in your mouth or throat, chest tightness, trouble breathing  · Anxiety, restlessness, fever, fast heartbeat, sweating, muscle spasms, diarrhea, seeing or hearing things that are not there  · Blistering, peeling, red skin rash  · Confusion, weakness, muscle twitching  · Dark urine or pale stools, nausea, vomiting, loss of appetite, stomach pain, yellow skin or eyes  · Decrease in how much or how often you urinate  · Eye pain, vision changes, seeing halos around lights  · Feeling more energetic than usual  · Lightheadedness, dizziness, or fainting  · Unusual moods or behaviors, worsening depression, thoughts about hurting yourself, trouble sleeping  · Unusual bleeding or bruising  If you notice these less serious side effects, talk with your doctor:   · Decrease in appetite or weight  · Dry mouth, constipation, mild nausea  · Unusual drowsiness, sleepiness, or tiredness  If you notice other side effects that you think are caused by this medicine, tell your doctor. Call your doctor for medical advice about side effects. You may report side effects to FDA at 1-920-FDA-4377  © 2017 [address] [employer]  Information is for End User's use only and may not be sold, redistributed or otherwise used for commercial purposes. The above information is an  only. It is not intended as medical advice for individual conditions or treatments. Talk to your doctor, nurse or pharmacist before following any medical regimen to see if it is safe and effective for you. Compression Fracture of the Spine: Care Instructions  Your Care Instructions    A compression fracture happens when the front part of a spinal bone breaks and collapses. A fall or other accident can cause it. A minor injury or moving the wrong way can cause a break if you have thin or brittle bones (osteoporosis). These types of breaks will heal in 8 to 10 weeks. You will need rest and pain medicines. Your doctor may recommend physical therapy.  Some doctors recommend that certain people with compression fractures wear braces. Your doctor also may treat thin or brittle bones. You may need surgery if you have lasting pain or if the bone presses on the spinal cord or nerves. You heal best when you take good care of yourself. Eat a variety of healthy foods, and don't smoke. Follow-up care is a key part of your treatment and safety. Be sure to make and go to all appointments, and call your doctor if you are having problems. It's also a good idea to know your test results and keep a list of the medicines you take. How can you care for yourself at home? · Be safe with medicines. Read and follow all instructions on the label. ¨ If the doctor gave you a prescription medicine for pain, take it as prescribed. ¨ If you are not taking a prescription pain medicine, ask your doctor if you can take an over-the-counter medicine. · Talk to your doctor about how to make your bones stronger. You may need medicines or a change in what you eat. · Be active only as directed by your doctor. When should you call for help? Call 911 anytime you think you may need emergency care. For example, call if:  · You are unable to move an arm or a leg at all. Call your doctor now or seek immediate medical care if:  · You have new or worse symptoms in your arms, chest, legs, belly, or buttocks. Symptoms may include:  ¨ Numbness or tingling. ¨ Weakness. ¨ Pain. · You lose bladder or bowel control. · You have belly pain, bloating, vomiting, or nausea. Watch closely for changes in your health, and be sure to contact your doctor if:  · You are not getting better as expected. Where can you learn more? Go to http://jayne-neelima.info/. Enter P445 in the search box to learn more about \"Compression Fracture of the Spine: Care Instructions. \"  Current as of: March 21, 2017  Content Version: 11.3  © 4033-6766 [employer].  Care instructions adapted under license by [employer] (which disclaims liability or warranty for this information). If you have questions about a medical condition or this instruction, always ask your healthcare professional. [place] [address] any warranty or liability for your use of this information. Medicare Part B Preventive Services Guidelines/Limitations Date last completed and Frequency Due Date   Bone Mass Measurement  (age 72 & older, biennial) Requires diagnosis related to osteoporosis or estrogen deficiency. Biennial benefit unless patient has history of long-term glucocorticoid tx or baseline is needed because initial test was by other method Completed 7/2017    Recommended every 2 years As recommended by your PCP or Specialist     Cardiovascular Screening Blood Tests (every 5 years)  Total cholesterol, HDL, Triglycerides Order as a panel if possible Completed 6/2017    As recommended by your PCP As recommended by your PCP or Specialist   Colorectal Cancer Screening  -Fecal occult blood test (annual)  -Flexible sigmoidoscopy (5y)  -Screening colonoscopy (10y)  -Barium Enema Age 49-80; After age [de-identified] if history of abnormal results Completed 2015     Recommended every 5 to 10 years  As recommended by your PCP or Specialist     Counseling to Prevent Tobacco Use (up to 8 sessions per year)  - Counseling greater than 3 and up to 10 minutes  - Counseling greater than 10 minutes Patients must be asymptomatic of tobacco-related conditions to receive as preventive service N/A N/A   Diabetes Screening Tests (at least every 3 years, Medicare covers annually or at 6-month intervals for prediabetic patients)    Fasting blood sugar (FBS) or glucose tolerance test (GTT) Patient must be diagnosed with one of the following:  -Hypertension, Dyslipidemia, obesity, previous impaired FBS or GTT  Or any two of the following: overweight, FH of diabetes, age ? 72, history of gestational diabetes, birth of baby weighing more than 9 pounds Completed 6/2017    Recommended every 3 years for non-diabetics     As recommended by your PCP or Specialist     Glaucoma Screening (no USPSTF recommendation) Diabetes mellitus, family history, , age 48 or over,  American, age 72 or over Completed twice a year    Recommended annually As recommended by your PCP or Specialist   Seasonal Influenza Vaccination (annually)  Recommended Annually Due Fall 2017   TDAP Vaccination  Completed 1/2013    Recommended every 10 years As recommended by your PCP or Specialist   Zoster (Shingles) Vaccination Covered by Medicare Part D through the pharmacy- PCP provides prescription Completed 7/2016    Recommended once over age 48  Complete   Pneumococcal Vaccination (once after 72)  Pneumo 23- 1/2013  Recommended once over the age of 72    Prevnar 15-  Recommended once over the age of 72 Complete        You are due for a Prevnar 13 vaccine. You can get this at your local pharmacy with a prescription from your PCP. If you think you have already received this vaccine, please notify our office of the administration date. Screening Mammography (biennial age 54-69) Annually (age 36 or over) Completed 7/2017   As recommended by your PCP or Specialist     Screening Pap Tests and Pelvic Examination (up to age 79 and after 79 if unknown history or abnormal study last 8 years) Every 25 months except high risk As recommended by your PCP or Specialist   As recommended by your PCP or Specialist     Ultrasound Screening for Abdominal Aortic Aneurysm (AAA) (once) Patient must be referred through [facility] and not have had a screening for abdominal aortic aneurysm before under Medicare.   Limited to patients who meet one of the following criteria:  - Men who are 73-68 years old and have smoked more than 100 cigarettes in their lifetime.  -Anyone with a FH of AAA  -Anyone recommended for screening by USPSTF Not indicated unless recommended by PCP   Not indicated unless recommended by PCP     Family Practice Management 2011    Please bring a copy of your completed advance medical directive to the office so it may be added to your medical record. Thank you. If you have any questions or concerns please feel free to contact me at [phone]. It was a pleasure meeting you today and participating in your healthcare.   Salvatore Saunders RN

## 2017-08-02 PROBLEM — Z71.89 ADVANCED CARE PLANNING/COUNSELING DISCUSSION: Status: ACTIVE | Noted: 2017-08-02

## 2017-08-02 NOTE — PROGRESS NOTES
Nurse Navigator Medicare Wellness Visit performed by RONEL Merritt RN    This is a Subsequent [place] Visit providing Personalized Prevention Plan Services (PPPS) (Performed 12 months after initial AWV and PPPS )    I have reviewed the patient's medical history in detail and updated the computerized patient record. History     Past Medical History:   Diagnosis Date    Glaucoma     Dr. Shen Jennings    Pap smear for cervical cancer screening     2009 neg 2010 neg 2012 neg      Past Surgical History:   Procedure Laterality Date    HX BREAST BIOPSY Left yrs ago    neg    HX TONSILLECTOMY       Current Outpatient Prescriptions   Medication Sig Dispense Refill    losartan (COZAAR) 50 mg tablet Take 1 Tab by mouth daily. 30 Tab 1    dorzolamide-timolol (COSOPT) 22.3-6.8 mg/mL ophthalmic solution       latanoprost (XALATAN) 0.005 % ophthalmic solution       cholecalciferol, vitamin D3, (VITAMIN D3) 2,000 unit tab Take  by mouth. No Known Allergies  Family History   Problem Relation Age of Onset    No Known Problems Mother      Social History   Substance Use Topics    Smoking status: Never Smoker    Smokeless tobacco: Never Used    Alcohol use Yes      Comment: rarely     Patient Active Problem List   Diagnosis Code    Advanced care planning/counseling discussion Z71.89       Depression Risk Factor Screening:   Patient denies feelings of being down, depressed or hopeless at this time. Patient states that they have a strong support system within their family & friends. PHQ over the last two weeks 8/1/2017   Little interest or pleasure in doing things Not at all   Feeling down, depressed or hopeless Not at all   Total Score PHQ 2 0     Alcohol Risk Factor Screening: On any occasion during the past 3 months, have you had more than 3 drinks containing alcohol? No    Do you average more than 7 drinks per week?   No        Functional Ability and Level of Safety:     Hearing Loss normal-to-mild    Activities of Daily Living   Self-care. Patient states that she lives with her  in a private residence. Patient states independence in all ADLs & denies the use of assistive devices for ambulation. NN encouraged patient to continue and/ or introduce routine physical exercise into their daily routine as applicable & as recommended by PCP. Patient verbalized understanding & agreement to take this into consideration. Patient shares that she enjoys engaging in regular exercise & stretching, but she suffers from chronic back pain which often limits her activity. Patient will discuss back pain in more detail with PCP today. Today, PCP provided patient with a referral to outpatient physical therapy for the back pain. Requires assistance with:   ADL Assessment 8/2/2017   Feeding yourself No Help Needed   Getting from bed to chair No Help Needed   Getting dressed No Help Needed   Bathing or showering No Help Needed   Walk across the room (includes cane/walker) No Help Needed   Using the telphone No Help Needed   Taking your medications No Help Needed   Preparing meals No Help Needed   Managing money (expenses/bills) No Help Needed   Moderately strenuous housework (laundry) No Help Needed   Shopping for personal items (toiletries/medicines) No Help Needed   Shopping for groceries No Help Needed   Driving No Help Needed   Climbing a flight of stairs No Help Needed   Getting to places beyond walking distances No Help Needed       Fall Risk   Fall Risk Assessment, last 12 mths 8/1/2017   Able to walk? Yes   Fall in past 12 months? No   Patient denies falls within the past year & verbalizes awareness of fall prevention strategies. Abuse Screen   Patient is not abused     Abuse Screening Questionnaire 8/1/2017   Do you ever feel afraid of your partner? N   Are you in a relationship with someone who physically or mentally threatens you? N   Is it safe for you to go home?  Y       Review of Systems Medicare Wellness Visit    Physical Examination     Evaluation of Cognitive Function:  Mood/affect:  happy  Appearance: age appropriate and casually dressed  Family member/caregiver input: None present; however, patient reports a strong support system. No exam performed today, Medicare Wellness Visit. Patient Care Team:  Lisa Cadet MD as PCP - General (Internal Medicine)  Ora Blackman MD as Physician (Obstetrics & Gynecology)    Advice/Referrals/Counseling   Education and counseling provided:  End-of-Life planning (with patient's consent)  Pneumococcal Vaccine  Influenza Vaccine  Screening Mammography  Screening Pap and pelvic (covered once every 2 years)  Colorectal cancer screening tests  Bone mass measurement (DEXA)  Screening for glaucoma  tdap & shingles vaccinations      Assessment/Plan   1. NN discussed with patient about an Advanced Medical Directive. Provided patient blank Advanced Medical Directive and 'Your Right to Decide' Booklet. NN reviewed Advanced Medical Directive paperwork with patient with a brief description of how to complete the form. Requested that if document completed, to please provide a copy of completed Advanced Medical Directive to PCP office. Patient verbalized understanding. 2. Patient is up to date on the following immunizations:  Immunization History   Administered Date(s) Administered    Pneumococcal Polysaccharide (PPSV-23) 01/17/2013    Tdap 01/17/2013    Zoster Vaccine, Live 07/28/2016   Patient confirmed the aforementioned preventative immunization dates are correct. Patient states that she is undecided about receiving a flu shot this year due to a previous bad flu shot experience (not a reaction to the medication, but vaccine was not placed IM). Patient is due for Prevnar 13. Patient is aware that Prevnar 13 can be given at their local pharmacy with a prescription from their PCP. Patient verbalized understanding. PCP notified.  Patient's health maintenance immunization record has been updated & is current. 3. Patient states that she follows the PCP's recommendations for the following screenings: Mammography (report on file from 7/2017), pap/ pelvic, DEXA scan (report on file from 7/2017) & colonoscopy. Patient reports that her last screening colonoscopy was performed by Dr. Timo Sow in 2015 with normal results. ARASELI faxed requesting a copy of patient's last colonoscopy screening report with patient's verbal approval.     4. Patient wears corrective lenses. Patient reports having a routine eye exam & glaucoma screening within the last year performed by Dr. Darnelle Cockayne at the [facility]. ARASELI faxed requesting a copy of patient's last eye exam with glaucoma screening with patient's verbal approval. Patient shares that she has glaucoma so she has an eye exam with Dr. Floresita Ramsey every 6 months. Patient verbalized understanding of all information discussed. Patient was given the opportunity to ask questions. Medication reconciliation completed by MA/ LPN and reviewed by PCP. Patient provided AVS, either a printed version or electronic version in [employer], which includes Medicare Wellness Preventative Screening Table.

## 2017-08-18 ENCOUNTER — OFFICE VISIT (OUTPATIENT)
Dept: INTERNAL MEDICINE CLINIC | Age: 77
End: 2017-08-18

## 2017-08-18 VITALS
HEART RATE: 66 BPM | WEIGHT: 152 LBS | RESPIRATION RATE: 12 BRPM | BODY MASS INDEX: 25.33 KG/M2 | SYSTOLIC BLOOD PRESSURE: 156 MMHG | TEMPERATURE: 98.2 F | DIASTOLIC BLOOD PRESSURE: 66 MMHG | HEIGHT: 65 IN

## 2017-08-18 DIAGNOSIS — M85.89 OSTEOPENIA OF MULTIPLE SITES: Primary | ICD-10-CM

## 2017-08-18 DIAGNOSIS — F51.01 PRIMARY INSOMNIA: ICD-10-CM

## 2017-08-18 DIAGNOSIS — R03.0 ELEVATED BLOOD PRESSURE READING WITHOUT DIAGNOSIS OF HYPERTENSION: ICD-10-CM

## 2017-08-18 NOTE — PROGRESS NOTES
Mitchell Boogie is a 78 y.o. female and presents with Hypertension (did not start Losartan-  been good at home)  . Subjective:  She came to [facility] to be near her daughters but both daughters moved. She presents today with her small book of bp measurements    Back pain  Melatonin is helping her sleep. She reports this is helping    Glaucoma  Followed by dr. Zeeshan Padilla    Memory  She reports she had situational stressors at home she is working on. She is concerned she has memory issues    bp without htn  Readings at home 110-125/56-70    Review of Systems  Constitutional: negative for fevers, chills, anorexia and weight loss  Eyes:   negative for visual disturbance and irritation  ENT:   negative for tinnitus,sore throat,nasal congestion,ear pains. hoarseness  Respiratory:  negative for cough, hemoptysis, dyspnea,wheezing  CV:   negative for chest pain, palpitations, lower extremity edema  GI:   negative for nausea, vomiting, diarrhea, abdominal pain,melena  Endo:               negative for polyuria,polydipsia,polyphagia,heat intolerance  Genitourinary: negative for frequency, dysuria and hematuria  Integument:  negative for rash and pruritus  Hematologic:  negative for easy bruising and gum/nose bleeding  Musculoskel: negative for myalgias, arthralgias, back pain, muscle weakness, joint pain  Neurological:  negative for headaches, dizziness, vertigo, memory problems and gait   Behavl/Psych: negative for feelings of anxiety, depression, mood changes    Past Medical History:   Diagnosis Date    Glaucoma     Dr. Jersey Menezes    Pap smear for cervical cancer screening     2009 neg 2010 neg 2012 neg     Past Surgical History:   Procedure Laterality Date    HX BREAST BIOPSY Left yrs ago    neg    HX TONSILLECTOMY       Social History     Social History    Marital status:      Spouse name: N/A    Number of children: N/A    Years of education: N/A     Social History Main Topics    Smoking status: Never Smoker    Smokeless tobacco: Never Used    Alcohol use Yes      Comment: rarely    Drug use: No    Sexual activity: Yes     Partners: Male     Birth control/ protection: None     Other Topics Concern    None     Social History Narrative    Retired from [address]         to  healthy    2 children daughters : healthy    2 grandson's : healthy     Family History   Problem Relation Age of Onset    No Known Problems Mother      Current Outpatient Prescriptions   Medication Sig Dispense Refill    MELATONIN PO Take  by mouth.  dorzolamide-timolol (COSOPT) 22.3-6.8 mg/mL ophthalmic solution       latanoprost (XALATAN) 0.005 % ophthalmic solution       cholecalciferol, vitamin D3, (VITAMIN D3) 2,000 unit tab Take  by mouth.  losartan (COZAAR) 50 mg tablet Take 1 Tab by mouth daily. 30 Tab 1     No Known Allergies    Objective:  Visit Vitals    /66 (BP 1 Location: Left arm, BP Patient Position: Sitting)    Pulse 66    Temp 98.2 °F (36.8 °C) (Oral)    Resp 12    Ht 5' 5\" (1.651 m)    Wt 152 lb (68.9 kg)    BMI 25.29 kg/m2     Physical Exam: repeat bp 166/90  General appearance - alert, well appearing, and in no distress  Mental status - alert, oriented to person, place, and time  EYE-CHENG, EOMI, corneas normal, no foreign bodies, visual acuity normal both eyes, no periorbital cellulitis  ENT-ENT exam normal, no neck nodes or sinus tenderness  Nose - normal and patent, no erythema, discharge or polyps  Mouth - mucous membranes moist, pharynx normal without lesions  Neck - supple, no significant adenopathy   Chest - clear to auscultation, no wheezes, rales or rhonchi, symmetric air entry   Heart - normal rate, regular rhythm, normal S1, S2, no murmurs, rubs, clicks or gallops   Abdomen - soft, nontender, nondistended, no masses or organomegaly  Lymph- no adenopathy palpable  Ext-peripheral pulses normal, no pedal edema, no clubbing or cyanosis  Skin-Warm and dry.  no hyperpigmentation, vitiligo, or suspicious lesions  Neuro -alert, oriented, normal speech, no focal findings or movement disorder noted  Neck-normal C-spine, no tenderness, full ROM without pain      Results for orders placed or performed in visit on 79/82/34   METABOLIC PANEL, COMPREHENSIVE   Result Value Ref Range    Glucose 83 65 - 99 mg/dL    BUN 25 8 - 27 mg/dL    Creatinine 0.72 0.57 - 1.00 mg/dL    GFR est non-AA 83 >59 mL/min/1.73    GFR est AA 95 >59 mL/min/1.73    BUN/Creatinine ratio 35 (H) 12 - 28    Sodium 140 134 - 144 mmol/L    Potassium 4.6 3.5 - 5.2 mmol/L    Chloride 99 96 - 106 mmol/L    CO2 24 18 - 29 mmol/L    Calcium 9.4 8.7 - 10.3 mg/dL    Protein, total 6.7 6.0 - 8.5 g/dL    Albumin 4.1 3.5 - 4.8 g/dL    GLOBULIN, TOTAL 2.6 1.5 - 4.5 g/dL    A-G Ratio 1.6 1.2 - 2.2    Bilirubin, total 0.4 0.0 - 1.2 mg/dL    Alk.  phosphatase 50 39 - 117 IU/L    AST (SGOT) 17 0 - 40 IU/L    ALT (SGPT) 11 0 - 32 IU/L   CBC W/O DIFF   Result Value Ref Range    WBC 5.2 3.4 - 10.8 x10E3/uL    RBC 4.60 3.77 - 5.28 x10E6/uL    HGB 14.3 11.1 - 15.9 g/dL    HCT 41.2 34.0 - 46.6 %    MCV 90 79 - 97 fL    MCH 31.1 26.6 - 33.0 pg    MCHC 34.7 31.5 - 35.7 g/dL    RDW 14.3 12.3 - 15.4 %    PLATELET 712 966 - 251 x10E3/uL   LIPID PANEL   Result Value Ref Range    Cholesterol, total 192 100 - 199 mg/dL    Triglyceride 98 0 - 149 mg/dL    HDL Cholesterol 74 >39 mg/dL    VLDL, calculated 20 5 - 40 mg/dL    LDL, calculated 98 0 - 99 mg/dL   HEMOGLOBIN A1C   Result Value Ref Range    Hemoglobin A1c 4.8 4.8 - 5.6 %    Estimated average glucose 91 mg/dL   TSH 3RD GENERATION   Result Value Ref Range    TSH 1.730 0.450 - 4.500 uIU/mL   VITAMIN D, 25 HYDROXY   Result Value Ref Range    VITAMIN D, 25-HYDROXY 48.4 30.0 - 100.0 ng/mL   FERRITIN   Result Value Ref Range    Ferritin 131 15 - 150 ng/mL   CVD REPORT   Result Value Ref Range    INTERPRETATION Note      Prevention    Cardiovascular profile  Family hx  Exercising:  exercisng in yard  Blood pressure:  Health healthy diet:  Diabetes:  Cholesterol:  Renal function:      Cancer risk profile  Mammogram 2016, Dr. Tricia Gonzales  Colonoscopy 2015 done  Skin nonhealing in 2 weeks dermatology Dr. Shakira Stanford abnormal bleeding/discharge/abd pain/pressure ana rosa michael      Thyroid sx    Osteopenia prevention  Calcium 1000mg/day yes  Vitamin D 800iu/day yes    Mental health scale:  Very good but feels memory compromsised  Depression  Anxiety  Sleep # of hours:  Energy Level:        Immunizations defers all vaccines because had bad event with injection several years ago 2015 6 months required PT to stop tendonitis  TDAP  Pneumonia vaccine  Flu vaccine  Shingles vaccine  HPV      Diagnoses and all orders for this visit:    1. Osteopenia of multiple sites  bmd reviewed with pt  She is taking 2700 iu daily and last level was 48. She will continue this amount and recheck in 3 months if stable then she needs to stay on 2700 if increases may need to drop to 1000 iu  -     VITAMIN D, 25 HYDROXY; Future    2. Elevated blood pressure reading without diagnosis of hypertension  Home readings reveal wnl    3. Primary insomnia  Cont melatonin    SLUMS 28/30 which is normal   disucssed with her memory may be impaired if feeling stressed /if she has not slept      I spent 25 min with this new pt and >50% of the time was spent in management and counseling of pt re:  MCI. She tested wnl with SLUMS. We will monitor over time. Cont to monitor bp    This note will not be viewable in MyChart.

## 2017-08-18 NOTE — MR AVS SNAPSHOT
Visit Information Date & Time Provider Department Dept. Phone Encounter #  
 8/18/2017 11:45 AM Sarah Joseph MD [facility] [address] [facility] [id number] Your Appointments 9/21/2017  8:45 AM  
COMPLETE PHYSICAL with Sarah Joseph MD  
[facility] [address]) Appt Note: CPE  $15CP $0PB sc 06/01/17 [address] [facility] [address]  
[phone]  
  
   
 [address] [place] [address] [facility] [address] Upcoming Health Maintenance Date Due FOBT Q 1 YEAR AGE 50-75 11/3/1992 Pneumococcal 65+ Low/Medium Risk (2 of 2 - PCV13) 1/17/2014 INFLUENZA AGE 9 TO ADULT 8/1/2017 MEDICARE YEARLY EXAM 8/2/2018 GLAUCOMA SCREENING Q2Y 1/15/2019 DTaP/Tdap/Td series (2 - Td) 1/17/2023 Allergies as of 8/18/2017  Review Complete On: 8/18/2017 By: Sarah Joseph MD  
 No Known Allergies Current Immunizations  Never Reviewed Name Date Pneumococcal Polysaccharide (PPSV-23) 1/17/2013 Tdap 1/17/2013 Zoster Vaccine, Live 7/28/2016 Not reviewed this visit You Were Diagnosed With   
  
 Codes Comments Osteopenia of multiple sites    -  Primary ICD-10-CM: M85.89 ICD-9-CM: 733.90 Elevated blood pressure reading without diagnosis of hypertension     ICD-10-CM: R03.0 ICD-9-CM: 796.2 Primary insomnia     ICD-10-CM: F51.01 
ICD-9-CM: 307.42 Vitals BP Pulse Temp Resp Height(growth percentile) Weight(growth percentile) 156/66 (BP 1 Location: Left arm, BP Patient Position: Sitting) 66 98.2 °F (36.8 °C) (Oral) 12 5' 5\" (1.651 m) 152 lb (68.9 kg) BMI OB Status Smoking Status 25.29 kg/m2 Postmenopausal Never Smoker Vitals History BMI and BSA Data Body Mass Index Body Surface Area  
 25.29 kg/m 2 1.78 m 2 Preferred Pharmacy Pharmacy Name Phone [facility] [address] [facility], [facility] [place] [facility], [address] [phone] Your Updated Medication List  
  
   
This list is accurate as of: 8/18/17  1:00 PM.  Always use your most recent med list.  
  
  
  
  
 dorzolamide-timolol 22.3-6.8 mg/mL ophthalmic solution Commonly known as:  COSOPT  
  
 latanoprost 0.005 % ophthalmic solution Commonly known as:  XALATAN  
  
 MELATONIN PO Take  by mouth. VITAMIN D3 2,000 unit Tab Generic drug:  cholecalciferol (vitamin D3) Take  by mouth. To-Do List   
 09/18/2017 Lab:  VITAMIN D, 25 HYDROXY Introducing [facility]! Dear Mile Salgado: Thank you for requesting a Virtual Web account. Our records indicate that you already have an active Virtual Web account. You can access your account anytime at [email or web address] Did you know that you can access your hospital and ER discharge instructions at any time in Virtual Web? You can also review all of your test results from your hospital stay or ER visit. Additional Information If you have questions, please visit the Frequently Asked Questions section of the Virtual Web website at [email or web address] Remember, Virtual Web is NOT to be used for urgent needs. For medical emergencies, dial 911. Now available from your iPhone and Android! Please provide this summary of care documentation to your next provider. Your primary care clinician is listed as Graham Carroll. If you have any questions after today's visit, please call [phone].

## 2017-09-18 DIAGNOSIS — M85.89 OSTEOPENIA OF MULTIPLE SITES: ICD-10-CM

## 2017-09-21 ENCOUNTER — OFFICE VISIT (OUTPATIENT)
Dept: INTERNAL MEDICINE CLINIC | Age: 77
End: 2017-09-21

## 2017-09-21 VITALS
SYSTOLIC BLOOD PRESSURE: 132 MMHG | HEART RATE: 63 BPM | WEIGHT: 153.8 LBS | BODY MASS INDEX: 25.62 KG/M2 | HEIGHT: 65 IN | DIASTOLIC BLOOD PRESSURE: 69 MMHG | RESPIRATION RATE: 16 BRPM | TEMPERATURE: 97.4 F | OXYGEN SATURATION: 99 %

## 2017-09-21 DIAGNOSIS — M81.6 LOCALIZED OSTEOPOROSIS WITHOUT CURRENT PATHOLOGICAL FRACTURE: Primary | ICD-10-CM

## 2017-09-21 DIAGNOSIS — R03.0 WHITE COAT SYNDROME WITH HIGH BLOOD PRESSURE BUT WITHOUT HYPERTENSION: ICD-10-CM

## 2017-09-21 DIAGNOSIS — Z12.11 COLON CANCER SCREENING: ICD-10-CM

## 2017-09-21 DIAGNOSIS — Z23 IMMUNIZATION DUE: ICD-10-CM

## 2017-09-21 NOTE — MR AVS SNAPSHOT
Visit Information Date & Time Provider Department Dept. Phone Encounter #  
 9/21/2017  9:00 AM Juan Hayes MD [facility] [address]  [id number] Upcoming Health Maintenance Date Due FOBT Q 1 YEAR AGE 50-75 11/3/1992 MEDICARE YEARLY EXAM 8/2/2018 GLAUCOMA SCREENING Q2Y 7/10/2019 DTaP/Tdap/Td series (2 - Td) 1/17/2023 Allergies as of 9/21/2017  Review Complete On: 9/21/2017 By: Juan Hayes MD  
 No Known Allergies Current Immunizations  Never Reviewed Name Date Pneumococcal Polysaccharide (PPSV-23) 1/17/2013 Tdap 1/17/2013 Zoster Vaccine, Live 7/28/2016 Not reviewed this visit You Were Diagnosed With   
  
 Codes Comments Localized osteoporosis without current pathological fracture    -  Primary ICD-10-CM: M81.6 ICD-9-CM: 733.09 Immunization due     ICD-10-CM: Z23 ICD-9-CM: V05.9 Colon cancer screening     ICD-10-CM: Z12.11 ICD-9-CM: V76.51 Vitals BP Pulse Temp Resp Height(growth percentile) Weight(growth percentile) 132/69 (BP 1 Location: Left arm, BP Patient Position: Sitting) 63 97.4 °F (36.3 °C) (Oral) 16 5' 5\" (1.651 m) 153 lb 12.8 oz (69.8 kg) SpO2 BMI OB Status Smoking Status 99% 25.59 kg/m2 Postmenopausal Never Smoker Vitals History BMI and BSA Data Body Mass Index Body Surface Area 25.59 kg/m 2 1.79 m 2 Preferred Pharmacy Pharmacy Name Phone [facility] [address] , [address] [place] [address] [phone] Your Updated Medication List  
  
   
This list is accurate as of: 9/21/17  9:40 AM.  Always use your most recent med list.  
  
  
  
  
 dorzolamide-timolol 22.3-6.8 mg/mL ophthalmic solution Commonly known as:  COSOPT  
  
 latanoprost 0.005 % ophthalmic solution Commonly known as:  XALATAN  
  
 MELATONIN PO Take  by mouth.  
  
 pneumococcal 13 van conj dip 0.5 mL Syrg injection Commonly known as:  PREVNAR-13  
0.5 mL by IntraMUSCular route once for 1 dose. VITAMIN D3 2,000 unit Tab Generic drug:  cholecalciferol (vitamin D3) Take  by mouth. Prescriptions Printed Refills  
 pneumococcal 13 van conj dip (PREVNAR-13) 0.5 mL syrg injection 0 Si.5 mL by IntraMUSCular route once for 1 dose. Class: Print Route: IntraMUSCular To-Do List   
 10/21/2017 Lab:  OCCULT BLOOD, IMMUNOASSAY (FIT) Patient Instructions Osteoporosis: Care Instructions Your Care Instructions Osteoporosis causes bones to become thin and weak. It is much more common in women than in men. Osteoporosis may be very advanced before you know you have it. Sometimes the first sign is a broken bone in the hip, spine, or wrist or sudden pain in your middle or lower back. Follow-up care is a key part of your treatment and safety. Be sure to make and go to all appointments, and call your doctor if you are having problems. It's also a good idea to know your test results and keep a list of the medicines you take. How can you care for yourself at home? · Your doctor may prescribe a bisphosphonate, such as risedronate (Actonel) or alendronate (Fosamax), for osteoporosis. If you are taking one of these medicines by mouth: 
¨ Take your medicine with a full glass of water when you first get up in the morning. ¨ Do not lie down, eat, drink a beverage, or take any other medicine for at least 30 minutes after taking the drug. This helps prevent stomach problems. ¨ Do not take your medicine late in the day if you forgot to take it in the morning. Skip it, and take the usual dose the next morning. ¨ If you have side effects, tell your doctor. He or she may prescribe another medicine. · Get enough calcium and vitamin D.  The [place] of Medicine recommends adults younger than age 46 need 1,000 mg of calcium and 600 IU of vitamin D each day. Women ages 46 to 79 need 1,200 mg of calcium and 600 IU of vitamin D each day. Men ages 46 to 79 need 1,000 mg of calcium and 600 IU of vitamin D each day. Adults 71 and older need 1,200 mg of calcium and 800 IU of vitamin D each day. ¨ Eat foods rich in calcium, like yogurt, cheese, milk, and dark green vegetables. This is a good way to get the calcium you need. You can get vitamin D from eggs, fatty fish, cereal, and milk. ¨ Talk to your doctor about taking a calcium plus vitamin D supplement. Be careful, though. Adults ages 23 to 48 should not get more than 2,500 mg of calcium and 4,000 IU of vitamin D each day, whether it is from supplements and/or food. Adults ages 46 and older should not get more than 2,000 mg of calcium and 4,000 IU of vitamin D each day from supplements and/or food. · Limit alcohol to 2 drinks a day for men and 1 drink a day for women. Too much alcohol can cause health problems. · Do not smoke. Smoking puts you at a much higher risk for osteoporosis. If you need help quitting, talk to your doctor about stop-smoking programs and medicines. These can increase your chances of quitting for good. · Get regular bone-building exercise. Weight-bearing and resistance exercises keep bones healthy by working the muscles and bones against gravity. Start out at an exercise level that feels right for you. Add a little at a time until you can do the following: ¨ Do 30 minutes of weight-bearing exercise on most days of the week. Walking, jogging, stair climbing, and dancing are good choices. ¨ Do resistance exercises with weights or elastic bands 2 to 3 days a week. · Reduce your risk of falls: 
¨ Wear supportive shoes with low heels and nonslip soles. ¨ Use a cane or walker, if you need it. Use shower chairs and bath benches. Put in handrails on stairways, around your shower or tub area, and near the toilet. ¨ Keep stairs, porches, and walkways well lit. Use night-lights. ¨ Remove throw rugs and other objects that are in the way. ¨ Avoid icy, wet, or slippery surfaces. ¨ Keep a cordless phone and a flashlight with new batteries by your bed. When should you call for help? Watch closely for changes in your health, and be sure to contact your doctor if you have any problems. Where can you learn more? Go to http://jayne-neelima.info/. Enter K100 in the search box to learn more about \"Osteoporosis: Care Instructions. \" Current as of: August 9, 2016 Content Version: 11.3 © 9840-5012 [employer]. Care instructions adapted under license by [employer] (which disclaims liability or warranty for this information). If you have questions about a medical condition or this instruction, always ask your healthcare professional. [address] any warranty or liability for your use of this information. Introducing \[facility] [place] [facility]\"" & HEALTH SERVICES! Dear Jodi Salcedo: Thank you for requesting a [employer] account. Our records indicate that you already have an active [employer] account. You can access your account anytime at [email or web address] Did you know that you can access your hospital and ER discharge instructions at any time in [employer]? You can also review all of your test results from your hospital stay or ER visit. Additional Information If you have questions, please visit the Frequently Asked Questions section of the [employer] website at [email or web address] Remember, [employer] is NOT to be used for urgent needs. For medical emergencies, dial 911. Now available from your iPhone and Android! Please provide this summary of care documentation to your next provider. Your primary care clinician is listed as Alvaro Petersen. If you have any questions after today's visit, please call [phone].

## 2017-09-21 NOTE — PATIENT INSTRUCTIONS
Osteoporosis: Care Instructions  Your Care Instructions    Osteoporosis causes bones to become thin and weak. It is much more common in women than in men. Osteoporosis may be very advanced before you know you have it. Sometimes the first sign is a broken bone in the hip, spine, or wrist or sudden pain in your middle or lower back. Follow-up care is a key part of your treatment and safety. Be sure to make and go to all appointments, and call your doctor if you are having problems. It's also a good idea to know your test results and keep a list of the medicines you take. How can you care for yourself at home? · Your doctor may prescribe a bisphosphonate, such as risedronate (Actonel) or alendronate (Fosamax), for osteoporosis. If you are taking one of these medicines by mouth:  ¨ Take your medicine with a full glass of water when you first get up in the morning. ¨ Do not lie down, eat, drink a beverage, or take any other medicine for at least 30 minutes after taking the drug. This helps prevent stomach problems. ¨ Do not take your medicine late in the day if you forgot to take it in the morning. Skip it, and take the usual dose the next morning. ¨ If you have side effects, tell your doctor. He or she may prescribe another medicine. · Get enough calcium and vitamin D. The [place] of Medicine recommends adults younger than age 46 need 1,000 mg of calcium and 600 IU of vitamin D each day. Women ages 46 to 79 need 1,200 mg of calcium and 600 IU of vitamin D each day. Men ages 46 to 79 need 1,000 mg of calcium and 600 IU of vitamin D each day. Adults 71 and older need 1,200 mg of calcium and 800 IU of vitamin D each day. ¨ Eat foods rich in calcium, like yogurt, cheese, milk, and dark green vegetables. This is a good way to get the calcium you need. You can get vitamin D from eggs, fatty fish, cereal, and milk. ¨ Talk to your doctor about taking a calcium plus vitamin D supplement. Be careful, though. Adults ages 23 to 48 should not get more than 2,500 mg of calcium and 4,000 IU of vitamin D each day, whether it is from supplements and/or food. Adults ages 46 and older should not get more than 2,000 mg of calcium and 4,000 IU of vitamin D each day from supplements and/or food. · Limit alcohol to 2 drinks a day for men and 1 drink a day for women. Too much alcohol can cause health problems. · Do not smoke. Smoking puts you at a much higher risk for osteoporosis. If you need help quitting, talk to your doctor about stop-smoking programs and medicines. These can increase your chances of quitting for good. · Get regular bone-building exercise. Weight-bearing and resistance exercises keep bones healthy by working the muscles and bones against gravity. Start out at an exercise level that feels right for you. Add a little at a time until you can do the following:  ¨ Do 30 minutes of weight-bearing exercise on most days of the week. Walking, jogging, stair climbing, and dancing are good choices. ¨ Do resistance exercises with weights or elastic bands 2 to 3 days a week. · Reduce your risk of falls:  ¨ Wear supportive shoes with low heels and nonslip soles. ¨ Use a cane or walker, if you need it. Use shower chairs and bath benches. Put in handrails on stairways, around your shower or tub area, and near the toilet. ¨ Keep stairs, porches, and walkways well lit. Use night-lights. ¨ Remove throw rugs and other objects that are in the way. ¨ Avoid icy, wet, or slippery surfaces. ¨ Keep a cordless phone and a flashlight with new batteries by your bed. When should you call for help? Watch closely for changes in your health, and be sure to contact your doctor if you have any problems. Where can you learn more? Go to http://jayne-neelima.info/. Enter K100 in the search box to learn more about \"Osteoporosis: Care Instructions. \"  Current as of: August 9, 2016  Content Version: 11.3  © 3784-4452 [employer]. Care instructions adapted under license by [employer] (which disclaims liability or warranty for this information). If you have questions about a medical condition or this instruction, always ask your healthcare professional. [address] any warranty or liability for your use of this information.

## 2017-09-21 NOTE — PROGRESS NOTES
Chief Complaint   Patient presents with    Blood Pressure Check     1. Have you been to the ER, urgent care clinic since your last visit? Hospitalized since your last visit? No    2. Have you seen or consulted any other health care providers outside of the [facility] since your last visit? Include any pap smears or colon screening. No    Advanced Care Directive is not on file.  Information added to AVS.

## 2017-09-21 NOTE — PROGRESS NOTES
Julianne Bay is a 78 y.o. female and presents with Blood Pressure Check  . Subjective:  Pt presents for follow up from last visit. Osteoporosis  Pt reports she was on fosamax years ago but stopped it by herself. She notes she had a dental implant years ago and glad she was not on fosamax. She is not doing and core exercises or weight bearing exercises regularly. Back pain  She is following with Physical therapy with good results    bp without htn  She has her home readings which reveal overall good control. Review of Systems  Constitutional: negative for fevers, chills, anorexia and weight loss  Eyes:   negative for visual disturbance and irritation  ENT:   negative for tinnitus,sore throat,nasal congestion,ear pains. hoarseness  Respiratory:  negative for cough, hemoptysis, dyspnea,wheezing  CV:   negative for chest pain, palpitations, lower extremity edema  GI:   negative for nausea, vomiting, diarrhea, abdominal pain,melena  Endo:               negative for polyuria,polydipsia,polyphagia,heat intolerance  Genitourinary: negative for frequency, dysuria and hematuria  Integument:  negative for rash and pruritus  Hematologic:  negative for easy bruising and gum/nose bleeding  Musculoskel: negative for myalgias, arthralgias, back pain, muscle weakness, joint pain  Neurological:  negative for headaches, dizziness, vertigo, memory problems and gait   Behavl/Psych: negative for feelings of anxiety, depression, mood changes    Past Medical History:   Diagnosis Date    Glaucoma     Dr. Geralene Galeazzi    Pap smear for cervical cancer screening     2009 neg 2010 neg 2012 neg     Past Surgical History:   Procedure Laterality Date    HX BREAST BIOPSY Left yrs ago    neg    HX TONSILLECTOMY       Social History     Social History    Marital status:      Spouse name: N/A    Number of children: N/A    Years of education: N/A     Social History Main Topics    Smoking status: Never Smoker    Smokeless tobacco: Never Used    Alcohol use Yes      Comment: rarely    Drug use: No    Sexual activity: Yes     Partners: Male     Birth control/ protection: None     Other Topics Concern    None     Social History Narrative    Retired from [address]         to  healthy    2 children daughters : healthy    2 grandson's : healthy     Family History   Problem Relation Age of Onset    No Known Problems Mother      Current Outpatient Prescriptions   Medication Sig Dispense Refill    dorzolamide-timolol (COSOPT) 22.3-6.8 mg/mL ophthalmic solution       latanoprost (XALATAN) 0.005 % ophthalmic solution       cholecalciferol, vitamin D3, (VITAMIN D3) 2,000 unit tab Take  by mouth.  MELATONIN PO Take  by mouth. No Known Allergies    Objective:  Visit Vitals    /69 (BP 1 Location: Left arm, BP Patient Position: Sitting)    Pulse 63    Temp 97.4 °F (36.3 °C) (Oral)    Resp 16    Ht 5' 5\" (1.651 m)    Wt 153 lb 12.8 oz (69.8 kg)    SpO2 99%    BMI 25.59 kg/m2     Physical Exam: repeat bp 156/  General appearance - alert, well appearing, and in no distress  Mental status - alert, oriented to person, place, and time  EYE-CHENG, EOMI, corneas normal, no foreign bodies, visual acuity normal both eyes, no periorbital cellulitis  ENT-ENT exam normal, no neck nodes or sinus tenderness  Nose - normal and patent, no erythema, discharge or polyps  Mouth - mucous membranes moist, pharynx normal without lesions  Neck - supple, no significant adenopathy   Chest - clear to auscultation, no wheezes, rales or rhonchi, symmetric air entry   Heart - normal rate, regular rhythm, normal S1, S2, no murmurs, rubs, clicks or gallops   Abdomen - soft, nontender, nondistended, no masses or organomegaly  Lymph- no adenopathy palpable  Ext-peripheral pulses normal, no pedal edema, no clubbing or cyanosis  Skin-Warm and dry.  no hyperpigmentation, vitiligo, or suspicious lesions  Neuro -alert, oriented, normal speech, no focal findings or movement disorder noted  Neck-normal C-spine, no tenderness, full ROM without pain      Results for orders placed or performed in visit on 72/97/16   METABOLIC PANEL, COMPREHENSIVE   Result Value Ref Range    Glucose 83 65 - 99 mg/dL    BUN 25 8 - 27 mg/dL    Creatinine 0.72 0.57 - 1.00 mg/dL    GFR est non-AA 83 >59 mL/min/1.73    GFR est AA 95 >59 mL/min/1.73    BUN/Creatinine ratio 35 (H) 12 - 28    Sodium 140 134 - 144 mmol/L    Potassium 4.6 3.5 - 5.2 mmol/L    Chloride 99 96 - 106 mmol/L    CO2 24 18 - 29 mmol/L    Calcium 9.4 8.7 - 10.3 mg/dL    Protein, total 6.7 6.0 - 8.5 g/dL    Albumin 4.1 3.5 - 4.8 g/dL    GLOBULIN, TOTAL 2.6 1.5 - 4.5 g/dL    A-G Ratio 1.6 1.2 - 2.2    Bilirubin, total 0.4 0.0 - 1.2 mg/dL    Alk.  phosphatase 50 39 - 117 IU/L    AST (SGOT) 17 0 - 40 IU/L    ALT (SGPT) 11 0 - 32 IU/L   CBC W/O DIFF   Result Value Ref Range    WBC 5.2 3.4 - 10.8 x10E3/uL    RBC 4.60 3.77 - 5.28 x10E6/uL    HGB 14.3 11.1 - 15.9 g/dL    HCT 41.2 34.0 - 46.6 %    MCV 90 79 - 97 fL    MCH 31.1 26.6 - 33.0 pg    MCHC 34.7 31.5 - 35.7 g/dL    RDW 14.3 12.3 - 15.4 %    PLATELET 831 938 - 391 x10E3/uL   LIPID PANEL   Result Value Ref Range    Cholesterol, total 192 100 - 199 mg/dL    Triglyceride 98 0 - 149 mg/dL    HDL Cholesterol 74 >39 mg/dL    VLDL, calculated 20 5 - 40 mg/dL    LDL, calculated 98 0 - 99 mg/dL   HEMOGLOBIN A1C   Result Value Ref Range    Hemoglobin A1c 4.8 4.8 - 5.6 %    Estimated average glucose 91 mg/dL   TSH 3RD GENERATION   Result Value Ref Range    TSH 1.730 0.450 - 4.500 uIU/mL   VITAMIN D, 25 HYDROXY   Result Value Ref Range    VITAMIN D, 25-HYDROXY 48.4 30.0 - 100.0 ng/mL   FERRITIN   Result Value Ref Range    Ferritin 131 15 - 150 ng/mL   CVD REPORT   Result Value Ref Range    INTERPRETATION Note      Prevention    Cardiovascular profile  Family hx  Exercising:  exercisng doing yard work  She is seeing Samy Quintanilla PT for core work  Blood pressure:  Health healthy diet:  Diabetes:  Cholesterol:  Renal function:      Cancer risk profile  Mammogram 2017,   Colonoscopy 2015 done  Skin nonhealing in 2 weeks dermatology Dr. Cathi Walter abnormal bleeding/discharge/abd pain/pressure ana rosa michael doing pelvic exams      Thyroid sx    Osteoporosis + defers fosamax and other treatments with rx  Calcium 1000mg/day yes  Vitamin D 800iu/day yes    Mental health scale:  Very good   Depression  Anxiety  Sleep # of hours:  Energy Level:        Immunizations defers all vaccines because had bad event with flu injection several years ago 2015   6 months required PT to stop tendonitis  TDAP  Pneumonia vaccine  Flu vaccine  Shingles vaccine  HPV    Diagnoses and all orders for this visit:    1. Localized osteoporosis without current pathological fracture  Reviewed bmd with patient  She defers Bisphosphonates and prefers to keep ca and vit d within range along with exercises       bp without htn  I think she has wc htn. We will continue to monitor. Home readings look good    2. Immunization due  -     pneumococcal 13 van conj dip (PREVNAR-13) 0.5 mL syrg injection; 0.5 mL by IntraMUSCular route once for 1 dose. 3. Colon cancer screening  -     OCCULT BLOOD, IMMUNOASSAY (FIT); Future      I spent 25 min with pt and >50% of the time was spent on management and counseling of pt re: osteoporosis, wc htn, immunizations risk vs benefit of pneumonia vaccine given prior adverse reaction ( I think she should still get prev 13),  Prevention for age group and factors to improve for successful aging (acp confernce meeting) sleep, exercise, heart healthy diet    This note will not be viewable in [facility].

## 2017-10-01 LAB — HEMOCCULT STL QL IA: NEGATIVE

## 2017-10-21 DIAGNOSIS — Z12.11 COLON CANCER SCREENING: ICD-10-CM

## 2017-11-28 ENCOUNTER — OFFICE VISIT (OUTPATIENT)
Dept: INTERNAL MEDICINE CLINIC | Age: 77
End: 2017-11-28

## 2017-11-28 VITALS
HEART RATE: 76 BPM | DIASTOLIC BLOOD PRESSURE: 96 MMHG | SYSTOLIC BLOOD PRESSURE: 167 MMHG | TEMPERATURE: 98.2 F | OXYGEN SATURATION: 97 % | WEIGHT: 154 LBS | RESPIRATION RATE: 16 BRPM | BODY MASS INDEX: 25.66 KG/M2 | HEIGHT: 65 IN

## 2017-11-28 DIAGNOSIS — M81.6 LOCALIZED OSTEOPOROSIS WITHOUT CURRENT PATHOLOGICAL FRACTURE: ICD-10-CM

## 2017-11-28 DIAGNOSIS — R04.2 GROSSLY BLOODY SPUTUM: Primary | ICD-10-CM

## 2017-11-28 DIAGNOSIS — R03.0 WHITE COAT SYNDROME WITHOUT DIAGNOSIS OF HYPERTENSION: ICD-10-CM

## 2017-11-28 NOTE — PROGRESS NOTES
Kiran Mota is a 78 y.o. female and presents with Hemoptysis  . Subjective:    Pt reports she is spitting up water and bright red blood. She thinks she is spitting up an 1/8 of a cup but  thinks it is less. She reports incident started yesterday night and occurred this am but less. She has done Warm saline solution to gargle and brushed tongue and several times  Never smoked  Sx started late yesterday evening at 7 pm  She reports felt need to cough and spit bloody phlegm  Does not feel sick and denies sob  This morning sx were less than last night  birght red and toward end it   Eating and swallowing fine  Has lots of drainage doesn ot take flonase  Dryness in house.  noting pt has been taking Zayra Emery Plus for several nights prior to episodes and noted dryness in house    Osteoporosis  Pt reports she was on fosamax years ago but stopped it by herself. She notes she had a dental implant years ago and glad she was not on fosamax. She is still not doing and core exercises or weight bearing exercises regularly. We discussed meds again today and she defers      Back pain  She is following with Physical therapy with good results    bp without htn  She has her home readings which reveal overall good control. She reports her bp cuff reading was elevated in our office as well and correlates with our readings  She has been on zayra seltzer cold which does have phenyl ephrine in it. Review of Systems  Constitutional: negative for fevers, chills, anorexia and weight loss  Eyes:   negative for visual disturbance and irritation  ENT:   negative for tinnitus,sore throat,nasal congestion,ear pains. hoarseness  Respiratory:  negative for cough, hemoptysis, dyspnea,wheezing  CV:   negative for chest pain, palpitations, lower extremity edema  GI:   negative for nausea, vomiting, diarrhea, abdominal pain,melena  Endo:               negative for polyuria,polydipsia,polyphagia,heat intolerance  Genitourinary: negative for frequency, dysuria and hematuria  Integument:  negative for rash and pruritus  Hematologic:  negative for easy bruising and gum/nose bleeding  Musculoskel: negative for myalgias, arthralgias, back pain, muscle weakness, joint pain  Neurological:  negative for headaches, dizziness, vertigo, memory problems and gait   Behavl/Psych: negative for feelings of anxiety, depression, mood changes    Past Medical History:   Diagnosis Date    Glaucoma     Dr. Demetrius Thompson    Pap smear for cervical cancer screening     2009 neg 2010 neg 2012 neg     Past Surgical History:   Procedure Laterality Date    HX BREAST BIOPSY Left yrs ago    neg    HX TONSILLECTOMY       Social History     Social History    Marital status:      Spouse name: N/A    Number of children: N/A    Years of education: N/A     Social History Main Topics    Smoking status: Never Smoker    Smokeless tobacco: Never Used    Alcohol use Yes      Comment: rarely    Drug use: No    Sexual activity: Yes     Partners: Male     Birth control/ protection: None     Other Topics Concern    None     Social History Narrative    Retired from [employer]         to  healthy    2 children daughters : healthy    2 grandson's : healthy     Family History   Problem Relation Age of Onset    No Known Problems Mother      Current Outpatient Prescriptions   Medication Sig Dispense Refill    dorzolamide-timolol (COSOPT) 22.3-6.8 mg/mL ophthalmic solution       latanoprost (XALATAN) 0.005 % ophthalmic solution       cholecalciferol, vitamin D3, (VITAMIN D3) 2,000 unit tab Take  by mouth.  MELATONIN PO Take  by mouth.        No Known Allergies    Objective:  Visit Vitals    BP (!) 187/92 (BP 1 Location: Right arm, BP Patient Position: Sitting)    Pulse 76    Temp 98.2 °F (36.8 °C) (Oral)    Resp 16    Ht 5' 5\" (1.651 m)    Wt 154 lb (69.9 kg)    SpO2 97%    BMI 25.63 kg/m2     Physical Exam: repeat bp 160/94  General appearance - alert, well appearing, and in no distress  Mental status - alert, oriented to person, place, and time  EYE-CHENG, EOMI, corneas normal, no foreign bodies, visual acuity normal both eyes, no periorbital cellulitis  ENT-ENT exam normal, no neck nodes or sinus tenderness  Nose - normal and patent, no erythema, discharge or polyps  Mouth - mucous membranes moist, pharynx normal without lesions  Neck - supple, no significant adenopathy, no masses or pain on palpation of neck  Chest - clear to auscultation, no wheezes, rales or rhonchi, symmetric air entry   Heart - normal rate, regular rhythm, normal S1, S2, no murmurs, rubs, clicks or gallops   Abdomen - soft, nontender, nondistended, no masses or organomegaly  Lymph- no adenopathy palpable  Ext-peripheral pulses normal, no pedal edema, no clubbing or cyanosis  Skin-Warm and dry. no hyperpigmentation, vitiligo, or suspicious lesions  Neuro -alert, oriented, normal speech, no focal findings or movement disorder noted  Neck-normal C-spine, no tenderness, full ROM without pain      Results for orders placed or performed in visit on 09/23/17   OCCULT BLOOD, IMMUNOASSAY (FIT)   Result Value Ref Range    Occult blood fecal, by IA Negative Negative      Diagnoses and all orders for this visit:    1. Grossly bloody sputum  May be related to dry house along with 625 mg of aspirin from [facility]. I think this may be a vessel which was irritated  Will monitor closely over the next few days  Low threshold to see ENT given her age, she is aware    2. White coat syndrome without diagnosis of hypertension  Again elevated on repeat but not as high    3. Localized osteoporosis without current pathological fracture  Again looked at pt's bone density. Educated re: taking ca and vit d and consequences of deferring fosamax and other meds vs se of bisphosp. She elects to continue to monitor    I spent 25 min with pt and  re: hemoptysis. Discussed causes related to otc meds and dryness vs possible cancer but with low risk factors other than age. Discussed osteoporosis and risk of fracture. She elects to monitor. She will montior her bp at home and bring in readings due to her peristent wc htn readings in the office. Pt and  agree to [facility]. This note will not be viewable in [address]

## 2017-11-28 NOTE — MR AVS SNAPSHOT
Visit Information Date & Time Provider Department Dept. Phone Encounter #  
 11/28/2017 10:40 AM Tracy Reyes MD [facility] [address]  [id number] Upcoming Health Maintenance Date Due  
 MEDICARE YEARLY EXAM 8/2/2018 FOBT Q 1 YEAR AGE 50-75 9/23/2018 GLAUCOMA SCREENING Q2Y 7/10/2019 DTaP/Tdap/Td series (2 - Td) 1/17/2023 Allergies as of 11/28/2017  Review Complete On: 11/28/2017 By: Tracy Reyes MD  
 No Known Allergies Current Immunizations  Never Reviewed Name Date Pneumococcal Polysaccharide (PPSV-23) 1/17/2013 Tdap 1/17/2013 Zoster Vaccine, Live 7/28/2016 Not reviewed this visit You Were Diagnosed With   
  
 Codes Comments Grossly bloody sputum    -  Primary ICD-10-CM: R04.2 ICD-9-CM: 786.30 White coat syndrome without diagnosis of hypertension     ICD-10-CM: R03.0 ICD-9-CM: 796.2 Localized osteoporosis without current pathological fracture     ICD-10-CM: M81.6 ICD-9-CM: 733.09 Vitals BP Pulse Temp Resp Height(growth percentile) Weight(growth percentile) (!) 187/92 (BP 1 Location: Right arm, BP Patient Position: Sitting) 76 98.2 °F (36.8 °C) (Oral) 16 5' 5\" (1.651 m) 154 lb (69.9 kg) SpO2 BMI OB Status Smoking Status 97% 25.63 kg/m2 Postmenopausal Never Smoker Vitals History BMI and BSA Data Body Mass Index Body Surface Area  
 25.63 kg/m 2 1.79 m 2 Preferred Pharmacy Pharmacy Name Phone [facility] [address] [phone] Your Updated Medication List  
  
   
This list is accurate as of: 11/28/17 11:22 AM.  Always use your most recent med list.  
  
  
  
  
 dorzolamide-timolol 22.3-6.8 mg/mL ophthalmic solution Commonly known as:  COSOPT  
  
 latanoprost 0.005 % ophthalmic solution Commonly known as:  XALATAN  
  
 MELATONIN PO Take  by mouth. VITAMIN D3 2,000 unit Tab Generic drug:  cholecalciferol (vitamin D3) Take  by mouth. Patient Instructions Coughing Up Blood: Care Instructions Your Care Instructions Coughing up blood can be frightening. The blood may come from the lungs, stomach, or throat. You may cough up a few thin streaks of bright red blood. This probably is not a cause for concern. Coughing up large amounts of bright red blood or rust-colored mucus from the lungs can be a symptom of a more serious condition. Several conditions can make you cough up blood from the lungs. These include bronchitis and pneumonia, or more serious problems such as cancer or a blood clot in the lung (pulmonary embolus). Depending on what is causing your cough, it may go away after the illness is treated. Your doctor may tell you not to suppress the cough with cough medicine if it is better for you to cough up the blood and spit it out. Follow-up care is a key part of your treatment and safety. Be sure to make and go to all appointments, and call your doctor if you are having problems. It's also a good idea to know your test results and keep a list of the medicines you take. How can you care for yourself at home? · Make a note of when and for how long you cough up blood. Also note if you are coughing up spit with a small amount of blood, or mostly blood. Take this information to your next appointment with your doctor. · Increase your fluid intake to at least 8 to 10 glasses of water every day. This helps keep the mucus thin and helps you cough it up. If you have kidney, heart, or liver disease and have to limit fluids, talk with your doctor before you increase your fluid intake. · If your doctor prescribed antibiotics, take them as directed. Do not stop taking them just because you feel better. You need to take the full course of antibiotics. · Do not take cough medicine without your doctor's guidance.  They can cause problems if you have other health problems. They can also interact with other medicine. · Do not smoke or use other forms of tobacco, especially while you have a cough. Smoking can make coughing worse. If you need help quitting, talk to your doctor about stop-smoking programs and medicines. These can increase your chances of quitting for good. · Avoid exposure to smoke, dust, or other pollutants. When should you call for help? Call 911 anytime you think you may need emergency care. For example, call if: 
? · You have sudden chest pain and shortness of breath. ? · You have severe trouble breathing. ?Call your doctor now or seek immediate medical care if: 
? · You have wheezing and difficulty breathing. ? · You are dizzy or lightheaded, or you feel like you may faint. ? · You cough up clots of blood. ? Watch closely for changes in your health, and be sure to contact your doctor if: 
? · You do not get better as expected. ? · You have any new symptoms, such as chest pain with difficulty breathing or a fever. Where can you learn more? Go to http://jayne-neelima.info/. Enter M550 in the search box to learn more about \"Coughing Up Blood: Care Instructions. \" Current as of: March 20, 2017 Content Version: 11.4 © 9844-9219 [employer]. Care instructions adapted under license by [employer] (which disclaims liability or warranty for this information). If you have questions about a medical condition or this instruction, always ask your healthcare professional. [place] [address] any warranty or liability for your use of this information. Preventing Osteoporosis: Care Instructions Your Care Instructions Osteoporosis means the bones are weak and thin enough that they can break easily. The older you are, the more likely you are to get osteoporosis. But with plenty of calcium, vitamin D, and exercise, you can help prevent osteoporosis. The preteen and teen years are a key time for bone building. With the help of calcium, vitamin D, and exercise in those early years and beyond, the bones reach their peak density and strength by age 27. After age 27, your bones naturally start to thin and weaken. The stronger your bones are at around age 27, the lower your risk for osteoporosis. But no matter what your age and risk are, your bones still need calcium, vitamin D, and exercise to stay strong. Also avoid smoking, and limit alcohol. Smoking and heavy alcohol use can make your bones thinner. Talk to your doctor about any special risks you might have, such as having a close relative with osteoporosis or taking a medicine that can weaken bones. Your doctor can tell you the best ways to protect your bones from thinning. Follow-up care is a key part of your treatment and safety. Be sure to make and go to all appointments, and call your doctor if you are having problems. It's also a good idea to know your test results and keep a list of the medicines you take. How can you care for yourself at home? · Get enough calcium and vitamin D. The [place] of Medicine recommends adults younger than age 46 need 1,000 mg of calcium and 600 IU of vitamin D each day. Women ages 46 to 79 need 1,200 mg of calcium and 600 IU of vitamin D each day. Men ages 46 to 79 need 1,000 mg of calcium and 600 IU of vitamin D each day. Adults 71 and older need 1,200 mg of calcium and 800 IU of vitamin D each day. ¨ Eat foods rich in calcium, like yogurt, cheese, milk, and dark green vegetables. ¨ Eat foods rich in vitamin D, like eggs, fatty fish, cereal, and fortified milk. ¨ Get some sunshine. Your body uses sunshine to make its own vitamin D. The safest time to be out in the sun is before 10 a.m. or after 3 p.m. Avoid getting sunburned. Sunburn can increase your risk of skin cancer. ¨ Talk to your doctor about taking a calcium plus vitamin D supplement. Ask about what type of calcium is right for you, and how much to take at a time. Adults ages 23 to 48 should not get more than 2,500 mg of calcium and 4,000 IU of vitamin D each day, whether it is from supplements and/or food. Adults ages 46 and older should not get more than 2,000 mg of calcium and 4,000 IU of vitamin D each day from supplements and/or food. · Get regular bone-building exercise. Weight-bearing and resistance exercises keep bones healthy by working the muscles and bones against gravity. Start out at an exercise level that feels right for you. Add a little at a time until you can do the following: ¨ Do 30 minutes of weight-bearing exercise on most days of the week. Walking, jogging, stair climbing, and dancing are good choices. ¨ Do resistance exercises with weights or elastic bands 2 to 3 days a week. · Limit alcohol. Drink no more than 1 alcohol drink a day if you are a woman. Drink no more than 2 alcohol drinks a day if you are a man. · Do not smoke. Smoking can make bones thin faster. If you need help quitting, talk to your doctor about stop-smoking programs and medicines. These can increase your chances of quitting for good. When should you call for help? Watch closely for changes in your health, and be sure to contact your doctor if you have any problems. Where can you learn more? Go to http://jayne-neelima.info/. Enter P279 in the search box to learn more about \"Preventing Osteoporosis: Care Instructions. \" Current as of: May 12, 2017 Content Version: 11.4 © 2207-3477 Healthwise, Incorporated. Care instructions adapted under license by [employer] (which disclaims liability or warranty for this information). If you have questions about a medical condition or this instruction, always ask your healthcare professional. [place] [address] any warranty or liability for your use of this information. Introducing [facility] SERVICES! Dear Catarino Branch: Thank you for requesting a GreenDust account. Our records indicate that you already have an active GreenDust account. You can access your account anytime at [email or web address]. [email or web address] Did you know that you can access your hospital and ER discharge instructions at any time in GreenDust? You can also review all of your test results from your hospital stay or ER visit. Additional Information If you have questions, please visit the Frequently Asked Questions section of the GreenDust website at [email or web address] Remember, GreenDust is NOT to be used for urgent needs. For medical emergencies, dial 911. Now available from your iPhone and Android! Please provide this summary of care documentation to your next provider. Your primary care clinician is listed as Miryam Fuller. If you have any questions after today's visit, please call [phone].

## 2017-11-28 NOTE — PATIENT INSTRUCTIONS
Coughing Up Blood: Care Instructions  Your Care Instructions    Coughing up blood can be frightening. The blood may come from the lungs, stomach, or throat. You may cough up a few thin streaks of bright red blood. This probably is not a cause for concern. Coughing up large amounts of bright red blood or rust-colored mucus from the lungs can be a symptom of a more serious condition. Several conditions can make you cough up blood from the lungs. These include bronchitis and pneumonia, or more serious problems such as cancer or a blood clot in the lung (pulmonary embolus). Depending on what is causing your cough, it may go away after the illness is treated. Your doctor may tell you not to suppress the cough with cough medicine if it is better for you to cough up the blood and spit it out. Follow-up care is a key part of your treatment and safety. Be sure to make and go to all appointments, and call your doctor if you are having problems. It's also a good idea to know your test results and keep a list of the medicines you take. How can you care for yourself at home? · Make a note of when and for how long you cough up blood. Also note if you are coughing up spit with a small amount of blood, or mostly blood. Take this information to your next appointment with your doctor. · Increase your fluid intake to at least 8 to 10 glasses of water every day. This helps keep the mucus thin and helps you cough it up. If you have kidney, heart, or liver disease and have to limit fluids, talk with your doctor before you increase your fluid intake. · If your doctor prescribed antibiotics, take them as directed. Do not stop taking them just because you feel better. You need to take the full course of antibiotics. · Do not take cough medicine without your doctor's guidance. They can cause problems if you have other health problems. They can also interact with other medicine.   · Do not smoke or use other forms of tobacco, especially while you have a cough. Smoking can make coughing worse. If you need help quitting, talk to your doctor about stop-smoking programs and medicines. These can increase your chances of quitting for good. · Avoid exposure to smoke, dust, or other pollutants. When should you call for help? Call 911 anytime you think you may need emergency care. For example, call if:  ? · You have sudden chest pain and shortness of breath. ? · You have severe trouble breathing. ?Call your doctor now or seek immediate medical care if:  ? · You have wheezing and difficulty breathing. ? · You are dizzy or lightheaded, or you feel like you may faint. ? · You cough up clots of blood. ? Watch closely for changes in your health, and be sure to contact your doctor if:  ? · You do not get better as expected. ? · You have any new symptoms, such as chest pain with difficulty breathing or a fever. Where can you learn more? Go to http://jayne-neelima.info/. Enter M550 in the search box to learn more about \"Coughing Up Blood: Care Instructions. \"  Current as of: March 20, 2017  Content Version: 11.4  © 8888-7381 [employer]. Care instructions adapted under license by [employer] (which disclaims liability or warranty for this information). If you have questions about a medical condition or this instruction, always ask your healthcare professional. [place] [address] any warranty or liability for your use of this information. Preventing Osteoporosis: Care Instructions  Your Care Instructions    Osteoporosis means the bones are weak and thin enough that they can break easily. The older you are, the more likely you are to get osteoporosis. But with plenty of calcium, vitamin D, and exercise, you can help prevent osteoporosis. The preteen and teen years are a key time for bone building.  With the help of calcium, vitamin D, and exercise in those early years and beyond, the bones reach their peak density and strength by age 27. After age 27, your bones naturally start to thin and weaken. The stronger your bones are at around age 27, the lower your risk for osteoporosis. But no matter what your age and risk are, your bones still need calcium, vitamin D, and exercise to stay strong. Also avoid smoking, and limit alcohol. Smoking and heavy alcohol use can make your bones thinner. Talk to your doctor about any special risks you might have, such as having a close relative with osteoporosis or taking a medicine that can weaken bones. Your doctor can tell you the best ways to protect your bones from thinning. Follow-up care is a key part of your treatment and safety. Be sure to make and go to all appointments, and call your doctor if you are having problems. It's also a good idea to know your test results and keep a list of the medicines you take. How can you care for yourself at home? · Get enough calcium and vitamin D. The [place] of Medicine recommends adults younger than age 46 need 1,000 mg of calcium and 600 IU of vitamin D each day. Women ages 46 to 79 need 1,200 mg of calcium and 600 IU of vitamin D each day. Men ages 46 to 79 need 1,000 mg of calcium and 600 IU of vitamin D each day. Adults 71 and older need 1,200 mg of calcium and 800 IU of vitamin D each day. ¨ Eat foods rich in calcium, like yogurt, cheese, milk, and dark green vegetables. ¨ Eat foods rich in vitamin D, like eggs, fatty fish, cereal, and fortified milk. ¨ Get some sunshine. Your body uses sunshine to make its own vitamin D. The safest time to be out in the sun is before 10 a.m. or after 3 p.m. Avoid getting sunburned. Sunburn can increase your risk of skin cancer. ¨ Talk to your doctor about taking a calcium plus vitamin D supplement. Ask about what type of calcium is right for you, and how much to take at a time.  Adults ages 23 to 48 should not get more than 2,500 mg of calcium and 4,000 IU of vitamin D each day, whether it is from supplements and/or food. Adults ages 46 and older should not get more than 2,000 mg of calcium and 4,000 IU of vitamin D each day from supplements and/or food. · Get regular bone-building exercise. Weight-bearing and resistance exercises keep bones healthy by working the muscles and bones against gravity. Start out at an exercise level that feels right for you. Add a little at a time until you can do the following:  ¨ Do 30 minutes of weight-bearing exercise on most days of the week. Walking, jogging, stair climbing, and dancing are good choices. ¨ Do resistance exercises with weights or elastic bands 2 to 3 days a week. · Limit alcohol. Drink no more than 1 alcohol drink a day if you are a woman. Drink no more than 2 alcohol drinks a day if you are a man. · Do not smoke. Smoking can make bones thin faster. If you need help quitting, talk to your doctor about stop-smoking programs and medicines. These can increase your chances of quitting for good. When should you call for help? Watch closely for changes in your health, and be sure to contact your doctor if you have any problems. Where can you learn more? Go to http://jayne-neelima.info/. Enter L285 in the search box to learn more about \"Preventing Osteoporosis: Care Instructions. \"  Current as of: May 12, 2017  Content Version: 11.4  © 9525-0981 Healthwise, Incorporated. Care instructions adapted under license by [employer] (which disclaims liability or warranty for this information). If you have questions about a medical condition or this instruction, always ask your healthcare professional. [place] [address] any warranty or liability for your use of this information.

## 2018-04-10 ENCOUNTER — OFFICE VISIT (OUTPATIENT)
Dept: OBGYN CLINIC | Age: 78
End: 2018-04-10

## 2018-04-10 ENCOUNTER — TELEPHONE (OUTPATIENT)
Dept: OBGYN CLINIC | Age: 78
End: 2018-04-10

## 2018-04-10 DIAGNOSIS — N76.0 ACUTE VAGINITIS: Primary | ICD-10-CM

## 2018-04-10 RX ORDER — FLUCONAZOLE 150 MG/1
150 TABLET ORAL DAILY
Qty: 3 TAB | Refills: 0 | Status: SHIPPED | OUTPATIENT
Start: 2018-04-10 | End: 2018-04-13

## 2018-04-10 NOTE — MR AVS SNAPSHOT
[address] [facility] [address] 1007 [facility] 
[phone] Patient: Joanne Patrick MRN: [id number]:[id number] Visit Information Date & Time Provider Department Dept. Phone Encounter #  
 4/10/2018  1:20 PM Ivonne Mcdonald MD lSy Honeycutt [phone] [id number] Your Appointments 6/8/2018  9:00 AM  
COMPLETE 40 with Indiana Benitez MD  
[facility] Note: AWV my chart SC 04/04/18 [address] [facility] [address]  
[phone]  
  
   
 [address] [facility] [address] Allergies as of 4/10/2018  Review Complete On: 4/10/2018 By: Jose Quesada No Known Allergies Current Immunizations  Never Reviewed Name Date Pneumococcal Polysaccharide (PPSV-23) 1/17/2013 Tdap 1/17/2013 Zoster Vaccine, Live 7/28/2016 Not reviewed this visit Vitals OB Status Smoking Status Postmenopausal Never Smoker Preferred Pharmacy Pharmacy Name Phone Chiquita Sandhu [address] [place] [address] [phone] Your Updated Medication List  
  
   
This list is accurate as of 4/10/18  1:31 PM.  Always use your most recent med list.  
  
  
  
  
 dorzolamide-timolol 22.3-6.8 mg/mL ophthalmic solution Commonly known as:  COSOPT  
  
 latanoprost 0.005 % ophthalmic solution Commonly known as:  XALATAN  
  
 MELATONIN PO Take  by mouth. VITAMIN D3 2,000 unit Tab Generic drug:  cholecalciferol (vitamin D3) Take  by mouth. To-Do List   
 04/17/2018 1:30 PM  
  Appointment with Mango Yousif MD; [facility] FLUORO 2 at [address] [facility] ([phone]) 1. Blood thinners and platelet inhibitors must be stopped 3-5 days prior to procedure. Consult your ordering physician prior to stopping them. Awaiting Radiologist to answer 2. Arrive 30 minutes prior to schedued exam time. 04/17/2018 2:00 PM  
  Appointment with [facility] CT 1 at [facility] ([phone]) CONTRAST STUDY: 1. The patient should not eat solid food four hours before the appointment but should be encouraged to drink clear liquids. 2. The patient will require IV access for contrast administration. 3. The patient should not take  Ibuprofen (Advil, Motrin, etc.) and Naproxen Sodium (Aleve, etc.)  on the day of the exam. Stopping non-steroidal anti-inflammatory agents (NSAIDs) like Ibuprofen decreases the risk of kidney damage from the x-ray contrast (dye). 4. Bring any non [facility] films/images pertaining to the area of interest with you on the day of appointment. 5. Bring current lab work if available(within last 90 days CMP) ***If scheduled at [facility], iSTAT is not available, labs will need to be done before appointment*** 6. Check in at registration at least 30 minutes before appt time unless you were instructed to do otherwise. 7. If you have to drink oral contrast please pick it up any weekday prior to your appointment, if you cannot please check in 2 hrs  before appt time. Introducing [facility] & HEALTH SERVICES! Dear Tammy Vale: Thank you for requesting a Shotlst account. Our records indicate that you already have an active Shotlst account. You can access your account anytime at [email or web address] Did you know that you can access your hospital and ER discharge instructions at any time in Shotlst? You can also review all of your test results from your hospital stay or ER visit. Additional Information If you have questions, please visit the Frequently Asked Questions section of the Shotlst website at [email or web address] Remember, Shotlst is NOT to be used for urgent needs. For medical emergencies, dial 911. Now available from your iPhone and Android! Please provide this summary of care documentation to your next provider. Your primary care clinician is listed as Masoud Marcus. If you have any questions after today's visit, please call [phone].

## 2018-04-10 NOTE — PROGRESS NOTES
Chief Complaint   Vaginitis      HPI  78 y.o. female complains of scant vaginal discharge. No LMP recorded. Patient is postmenopausal.  She admits to additional symptoms at this time. Itching, burning  The patient  admits to aggravating factors  She denies exposure to new chemicals ot hygenic agents  Previous treatment included:  OTC yeast cream such as Monistat or Gyne-Lotrimin    Past Medical History:   Diagnosis Date    Glaucoma     Dr. Bonifacio Fulton Pap smear for cervical cancer screening     2009 neg 2010 neg 2012 neg     Past Surgical History:   Procedure Laterality Date    HX BREAST BIOPSY Left yrs ago    neg    HX TONSILLECTOMY       Social History     Occupational History    Not on file. Social History Main Topics    Smoking status: Never Smoker    Smokeless tobacco: Never Used    Alcohol use Yes      Comment: rarely    Drug use: No    Sexual activity: Yes     Partners: Male     Birth control/ protection: None     Family History   Problem Relation Age of Onset    No Known Problems Mother         No Known Allergies  Prior to Admission medications    Medication Sig Start Date End Date Taking? Authorizing Provider   MELATONIN PO Take  by mouth. Historical Provider   dorzolamide-timolol (COSOPT) 22.3-6.8 mg/mL ophthalmic solution  5/22/17   Historical Provider   latanoprost (XALATAN) 0.005 % ophthalmic solution  4/25/17   Historical Provider   cholecalciferol, vitamin D3, (VITAMIN D3) 2,000 unit tab Take  by mouth.     Historical Provider                      Review of Systems - History obtained from the patient  Constitutional: negative for weight loss, fever, night sweats  Breast: negative for breast lumps, nipple discharge, galactorrhea  GI: negative for change in bowel habits, abdominal pain, black or bloody stools  : negative for frequency, dysuria, hematuria  MSK: negative for back pain, joint pain, muscle pain  Skin: negative for itching, rash, hives  Neuro: negative for dizziness, headache, confusion, weakness  Psych: negative for anxiety, depression, change in mood  Heme/lymph: negative for bleeding, bruising, pallor       Objective: There were no vitals taken for this visit. Physical Exam:   PHYSICAL EXAMINATION    Constitutional  · Appearance: well-nourished, well developed, alert, in no acute distress    HENT  · Head and Face: appears normal    Genitourinary  · External Genitalia: normal appearance for age,no discharge present, no tenderness present, no inflammatory lesions present, no masses present, no atrophy present  · Vagina: + discharge present, recent monistat use, otherwise normal vaginal vault without central or paravaginal defects, no inflammatory lesions present, no masses present  · Bladder: non-tender to palpation  · Urethra: appears normal  · Cervix: normal   · Uterus: normal size, shape and consistency  · Adnexa: no adnexal tenderness present, no adnexal masses present  · Perineum: perineum within normal limits, no evidence of trauma, no rashes or skin lesions present  · Anus: anus within normal limits, no hemorrhoids present  · Inguinal Lymph Nodes: no lymphadenopathy present    Skin  · General Inspection: no rash, no lesions identified    Neurologic/Psychiatric  · Mental Status:  · Orientation: grossly oriented to person, place and time  · Mood and Affect: mood normal, affect appropriate    Wet prep - negative, however limited by recent monistat use    Assessment:   Vaginitis, will treat for yeast and rto if no improvement. Plan:   ROV prn if symptoms persist or worsen.

## 2018-04-10 NOTE — TELEPHONE ENCOUNTER
Call received at 11:30am      78year old patient last seen in the office on 5/4/17. Patient calling to describe that she has been dealing with vaginal itching and burning, cottage cheese discharge,(? Grayish). Patient also reports pain with intercourse. Patient has been using over the counter Monistat with out relief. Patient placed on the schedule to be seen to day at 1:20PM.    Nurse Rodriguez aware of add on appointment. Patient verbalized understanding.

## 2018-04-30 ENCOUNTER — HOSPITAL ENCOUNTER (OUTPATIENT)
Dept: MRI IMAGING | Age: 78
Discharge: HOME | End: 2018-04-30
Attending: FAMILY MEDICINE
Payer: MEDICARE

## 2018-04-30 DIAGNOSIS — M25.512 LEFT SHOULDER PAIN: ICD-10-CM

## 2018-04-30 DIAGNOSIS — M75.42 ROTATOR CUFF IMPINGEMENT SYNDROME, LEFT: ICD-10-CM

## 2018-04-30 PROCEDURE — 73221 MRI JOINT UPR EXTREM W/O DYE: CPT

## 2018-09-08 ENCOUNTER — HOSPITAL ENCOUNTER (OUTPATIENT)
Dept: GENERAL RADIOLOGY | Age: 78
Discharge: HOME | End: 2018-09-08
Attending: PHYSICAL MEDICINE & REHABILITATION
Payer: MEDICARE

## 2018-09-08 ENCOUNTER — HOSPITAL ENCOUNTER (OUTPATIENT)
Dept: MRI IMAGING | Age: 78
Discharge: HOME | End: 2018-09-08
Attending: PHYSICAL MEDICINE & REHABILITATION
Payer: MEDICARE

## 2018-09-08 DIAGNOSIS — M51.24 DISPLACEMENT OF THORACIC INTERVERTEBRAL DISC WITHOUT MYELOPATHY: ICD-10-CM

## 2018-09-08 DIAGNOSIS — M54.14 THORACIC NEURITIS: ICD-10-CM

## 2018-09-08 DIAGNOSIS — M47.814 THORACIC SPONDYLOSIS WITHOUT MYELOPATHY: ICD-10-CM

## 2018-09-08 PROCEDURE — 72146 MRI CHEST SPINE W/O DYE: CPT

## 2018-09-08 PROCEDURE — 72074 X-RAY EXAM THORAC SPINE4/>VW: CPT

## 2018-09-13 ENCOUNTER — OFFICE VISIT (OUTPATIENT)
Dept: INTERNAL MEDICINE CLINIC | Age: 78
End: 2018-09-13

## 2018-09-13 VITALS
SYSTOLIC BLOOD PRESSURE: 137 MMHG | DIASTOLIC BLOOD PRESSURE: 80 MMHG | HEART RATE: 71 BPM | OXYGEN SATURATION: 100 % | WEIGHT: 151.8 LBS | RESPIRATION RATE: 12 BRPM | TEMPERATURE: 98 F | HEIGHT: 65 IN | BODY MASS INDEX: 25.29 KG/M2

## 2018-09-13 DIAGNOSIS — E53.8 B12 DEFICIENCY: ICD-10-CM

## 2018-09-13 DIAGNOSIS — M80.00XD AGE-RELATED OSTEOPOROSIS WITH CURRENT PATHOLOGICAL FRACTURE WITH ROUTINE HEALING, SUBSEQUENT ENCOUNTER: ICD-10-CM

## 2018-09-13 DIAGNOSIS — G25.81 RESTLESS LEGS: Primary | ICD-10-CM

## 2018-09-13 DIAGNOSIS — Z23 IMMUNIZATION DUE: ICD-10-CM

## 2018-09-13 DIAGNOSIS — G89.29 CHRONIC MIDLINE THORACIC BACK PAIN: ICD-10-CM

## 2018-09-13 DIAGNOSIS — M54.6 CHRONIC MIDLINE THORACIC BACK PAIN: ICD-10-CM

## 2018-09-13 DIAGNOSIS — R53.83 FATIGUE, UNSPECIFIED TYPE: ICD-10-CM

## 2018-09-13 RX ORDER — DULOXETIN HYDROCHLORIDE 20 MG/1
20 CAPSULE, DELAYED RELEASE ORAL DAILY
Qty: 30 CAP | Refills: 0 | Status: SHIPPED | OUTPATIENT
Start: 2018-09-13 | End: 2018-10-22 | Stop reason: SDUPTHER

## 2018-09-13 RX ORDER — CALCIUM CARBONATE 500(1250)
TABLET ORAL DAILY
COMMUNITY
End: 2021-04-15

## 2018-09-13 NOTE — MR AVS SNAPSHOT
[address] [facility] 
 
 
 [address] [facility] 1007 [facility] 
[phone] Patient: Zahra Quintanilla MRN: [id number] Visit Information Date & Time Provider Department Dept. Phone Encounter #  
 9/13/2018  2:00 PM Little England MD [facility] [id number] Your Appointments 12/19/2018  9:00 AM  
COMPLETE 40 with Little England MD  
[facility] [address]) Appt Note: AWV TR 9/12/18 [address] [facility] [address] [address]  
[phone]  
  
   
 [address] [facility] [address] Upcoming Health Maintenance Date Due Influenza Age 5 to Adult 8/1/2018 MEDICARE YEARLY EXAM 9/12/2018 GLAUCOMA SCREENING Q2Y 7/10/2019 DTaP/Tdap/Td series (2 - Td) 1/17/2023 Allergies as of 9/13/2018  Review Complete On: 9/13/2018 By: Wang Castrejon LPN No Known Allergies Current Immunizations  Never Reviewed Name Date Pneumococcal Polysaccharide (PPSV-23) 1/17/2013 Tdap 1/17/2013 Zoster Vaccine, Live 7/28/2016 Not reviewed this visit You Were Diagnosed With   
  
 Codes Comments Restless legs    -  Primary ICD-10-CM: G25.81 ICD-9-CM: 333.94 Chronic midline thoracic back pain     ICD-10-CM: M54.6, G89.29 ICD-9-CM: 724.1, 338.29 Age-related osteoporosis with current pathological fracture with routine healing, subsequent encounter     ICD-10-CM: M80.00XD ICD-9-CM: HCA3976 Immunization due     ICD-10-CM: Z23 ICD-9-CM: V05.9 Fatigue, unspecified type     ICD-10-CM: R53.83 ICD-9-CM: 780.79   
 B12 deficiency     ICD-10-CM: E53.8 ICD-9-CM: 266.2 Vitals BP Pulse Temp Resp Height(growth percentile) Weight(growth percentile) 137/80 (BP 1 Location: Left arm, BP Patient Position: Sitting) 71 98 °F (36.7 °C) (Oral) 12 5' 5\" (1.651 m) 151 lb 12.8 oz (68.9 kg) SpO2 BMI OB Status Smoking Status 100% 25.26 kg/m2 Postmenopausal Never Smoker BMI and BSA Data Body Mass Index Body Surface Area  
 25.26 kg/m 2 1.78 m 2 Preferred Pharmacy Pharmacy Name Phone Kecia Benitez [address] [facility], [address] [place] [address] [phone] Your Updated Medication List  
  
   
This list is accurate as of 9/13/18  2:58 PM.  Always use your most recent med list.  
  
  
  
  
 calcium carbonate 500 mg calcium (1,250 mg) tablet Commonly known as:  OS-FLY Take  by mouth daily. dorzolamide-timolol 22.3-6.8 mg/mL ophthalmic solution Commonly known as:  COSOPT  
  
 latanoprost 0.005 % ophthalmic solution Commonly known as:  XALATAN  
  
 MELATONIN PO Take  by mouth. VITAMIN D3 2,000 unit Tab Generic drug:  cholecalciferol (vitamin D3) Take  by mouth. To-Do List   
 10/05/2018 3:15 PM  
(Arrive by 3:00 PM) Appointment with [facility] KE[facility] 1 at [facility] ([phone]) Shower or bathe using soap and water. Do not use deodorant, powder, perfumes, or lotion the day of your exam.  If your prior mammograms were not performed at [place] [address] please bring films with you or forward prior images 2 days before your procedure. Check in at registration 15min before your appointment time unless you were instructed to do otherwise. A script is not necessary, but if you have one, please bring it on the day of the mammogram or have it faxed to the department. You are responsible for finding a method of transportation to your appointment. If you don't have transportation, please reschedule your appointment at least 24 hours in advance. [facility] [phone] [facility]  [phone] [address]  [phone] [facility] [phone] [place] [address] [place] [phone] Please arrive 15 minutes prior to appointment to register Introducing [facility] & HEALTH SERVICES! Dear Stevie Pulido: Thank you for requesting a Orteq account. Our records indicate that you already have an active Orteq account. You can access your account anytime at [email or web address] Did you know that you can access your hospital and ER discharge instructions at any time in Orteq? You can also review all of your test results from your hospital stay or ER visit. Additional Information If you have questions, please visit the Frequently Asked Questions section of the Orteq website at [email or web address] Remember, Orteq is NOT to be used for urgent needs. For medical emergencies, dial 911. Now available from your iPhone and Android! Please provide this summary of care documentation to your next provider. Your primary care clinician is listed as Lj Chance. If you have any questions after today's visit, please call [phone].

## 2018-09-13 NOTE — PROGRESS NOTES
Chief Complaint Patient presents with  Back Pain Back pain Patient presents for follow-up on her back pain. She reports she is able to sleep at night. She reports she does not have any pain when she is sitting or at rest.  She notes that with activity her pain quickly starts and is interfering with her cooking and relationship with her . She has a very difficult time being in the kitchen and doing with dishes or even preparing a small meal.  She has seen a spine specialist and they are interested in doing interventions. She is concerned because she does not know if they will be effective. She is not on a muscle relaxant. She reports she had a fall several years ago and has not recovered. Her pain is severe with activity. It is localized in her upper thoracic back. She denies fevers night sweats or chills. She denies weight loss. She notes she is getting quite irritable with the pain. She notes that when she does move there is a sharp severe pain that radiates from her upper back around to her front of her chest. 
 
 
Fatigue Patient reports increased fatigue and lack of energy. She denies weight loss. She thinks she is B12 deficient as she was in the past. 
 
Osteoporosis Patient is taking calcium and vitamin D. She is taking 500 mg of calcium and 2500 international units of vitamin D. She was on Fosamax in the past but electively took herself off. She is not sure if she wants to go back on the Fosamax as she thinks that it will not help her pain. Restless legs Patient reports she still has difficulty getting rest because her legs feel like they are moving all the time. Past Medical History:  
Diagnosis Date  Fracture of left foot 06/2018  Glaucoma Dr. Sandra Gonzalez  Pap smear for cervical cancer screening 2009 neg 2010 neg 2012 neg Past Surgical History:  
Procedure Laterality Date  HX BREAST BIOPSY Left yrs ago  
 neg  HX TONSILLECTOMY Social History Social History  Marital status:  Spouse name: N/A  
 Number of children: N/A  
 Years of education: N/A Social History Main Topics  Smoking status: Never Smoker  Smokeless tobacco: Never Used  Alcohol use Yes Comment: rarely  Drug use: No  
 Sexual activity: Yes  
  Partners: Male Birth control/ protection: None Other Topics Concern  None Social History Narrative Retired from [employer]  to  healthy 2 children daughters : healthy 2 grandson's : healthy Family History Problem Relation Age of Onset  No Known Problems Mother Current Outpatient Prescriptions Medication Sig Dispense Refill  calcium carbonate (OS-FLY) 500 mg calcium (1,250 mg) tablet Take  by mouth daily.  DULoxetine (CYMBALTA) 20 mg capsule Take 1 Cap by mouth daily. 30 Cap 0  pneumococcal 13 van conj dip (PREVNAR-13) 0.5 mL syrg injection 0.5 mL by IntraMUSCular route once for 1 dose. 0.5 mL 0  
 varicella-zoster recombinant, PF, (SHINGRIX, PF,) 50 mcg/0.5 mL susr injection 0.5 mL by IntraMUSCular route once for 1 dose. Repeat in 2-6 months 0.5 mL 1  
 dorzolamide-timolol (COSOPT) 22.3-6.8 mg/mL ophthalmic solution  latanoprost (XALATAN) 0.005 % ophthalmic solution  cholecalciferol, vitamin D3, (VITAMIN D3) 2,000 unit tab Take  by mouth.  MELATONIN PO Take  by mouth. No Known Allergies Review of Systems - General ROS: negative for - chills or fever Cardiovascular ROS: no chest pain or dyspnea on exertion Respiratory ROS: no cough, shortness of breath, or wheezing Visit Vitals  /80 (BP 1 Location: Left arm, BP Patient Position: Sitting)  Pulse 71  Temp 98 °F (36.7 °C) (Oral)  Resp 12  Ht 5' 5\" (1.651 m)  Wt 151 lb 12.8 oz (68.9 kg)  SpO2 100%  BMI 25.26 kg/m2 General Appearance:  Well developed, well nourished,alert and oriented x 3, and individual in no acute distress. Ears/Nose/Mouth/Throat:   Hearing grossly normal. 
  
    Neck: Supple, no lad, no bruits Chest:   Lungs clear to auscultation bilaterally. Cardiovascular:  Regular rate and rhythm, S1, S2 normal, no murmur. Abdomen:   Soft, non-tender, bowel sounds are active. Extremities: No edema bilaterally. Skin: Warm and dry, no suspicious lesions Diagnoses and all orders for this visit: 1. Restless legs Check ferritin and replete as needed -     METABOLIC PANEL, COMPREHENSIVE 
-     VITAMIN D, 25 HYDROXY 
-     VITAMIN B12 
-     FERRITIN 
 
2. Chronic midline thoracic back pain Ongoing symptoms not improving and now patient is developing some irritability. I discussed with her and we will try her on Cymbalta. If no side effects we will increase to 60 mg. If this does not work we may need to try Neurontin. I will see her in December for her annual and can call in her on Neurontin if the Cymbalta is not working 
-     DULoxetine (CYMBALTA) 20 mg capsule; Take 1 Cap by mouth daily. 
-     REFERRAL TO PHYSICIAL MEDICINE REHAB 
-     METABOLIC PANEL, COMPREHENSIVE 
-     VITAMIN D, 25 HYDROXY 3. Age-related osteoporosis with current pathological fracture with routine healing, subsequent encounter Patient is not on bisphosphonate although she probably should be. I discussed this with her. I noted to her that if her calcium and vitamin D levels are appropriate the bisphosphonate may help to prevent her from another fracture and therefore pain. -     REFERRAL TO ENDOCRINOLOGY -     VITAMIN D, 25 HYDROXY 4. Immunization due -     pneumococcal 13 van conj dip (PREVNAR-13) 0.5 mL syrg injection; 0.5 mL by IntraMUSCular route once for 1 dose. 
-     varicella-zoster recombinant, PF, (SHINGRIX, PF,) 50 mcg/0.5 mL susr injection; 0.5 mL by IntraMUSCular route once for 1 dose. Repeat in 2-6 months 5. Fatigue, unspecified type May likely be due to the chronic pain that she has had 
-     VITAMIN B12 
 
6. B12 deficiency Replete as needed 
-     VITAMIN B12 This note will not be viewable in [address]

## 2018-09-14 LAB
25(OH)D3+25(OH)D2 SERPL-MCNC: 48.2 NG/ML (ref 30–100)
ALBUMIN SERPL-MCNC: 4.3 G/DL (ref 3.5–4.8)
ALBUMIN/GLOB SERPL: 1.9 {RATIO} (ref 1.2–2.2)
ALP SERPL-CCNC: 46 IU/L (ref 39–117)
ALT SERPL-CCNC: 13 IU/L (ref 0–32)
AST SERPL-CCNC: 19 IU/L (ref 0–40)
BILIRUB SERPL-MCNC: 0.6 MG/DL (ref 0–1.2)
BUN SERPL-MCNC: 23 MG/DL (ref 8–27)
BUN/CREAT SERPL: 24 (ref 12–28)
CALCIUM SERPL-MCNC: 9.8 MG/DL (ref 8.7–10.3)
CHLORIDE SERPL-SCNC: 100 MMOL/L (ref 96–106)
CO2 SERPL-SCNC: 26 MMOL/L (ref 20–29)
CREAT SERPL-MCNC: 0.95 MG/DL (ref 0.57–1)
FERRITIN SERPL-MCNC: 169 NG/ML (ref 15–150)
GLOBULIN SER CALC-MCNC: 2.3 G/DL (ref 1.5–4.5)
GLUCOSE SERPL-MCNC: 88 MG/DL (ref 65–99)
INTERPRETATION: NORMAL
POTASSIUM SERPL-SCNC: 5 MMOL/L (ref 3.5–5.2)
PROT SERPL-MCNC: 6.6 G/DL (ref 6–8.5)
SODIUM SERPL-SCNC: 140 MMOL/L (ref 134–144)
VIT B12 SERPL-MCNC: 552 PG/ML (ref 232–1245)

## 2018-09-25 ENCOUNTER — TELEPHONE (OUTPATIENT)
Dept: INTERNAL MEDICINE CLINIC | Age: 78
End: 2018-09-25

## 2018-09-25 RX ORDER — GABAPENTIN 100 MG/1
100 CAPSULE ORAL
Qty: 60 CAP | Refills: 0 | Status: SHIPPED | OUTPATIENT
Start: 2018-09-25 | End: 2018-10-21 | Stop reason: SDUPTHER

## 2018-09-25 NOTE — TELEPHONE ENCOUNTER
Yes please tell pt to stop cymbalta due to se. She can try low dose [place] per my last note.  I will send in.shawn

## 2018-09-25 NOTE — TELEPHONE ENCOUNTER
Patient needs to speak with nurse regarding her DULoxetine (CYMBALTA) 20 mg capsule.   Her no is [phone]

## 2018-09-25 NOTE — TELEPHONE ENCOUNTER
Patient stated she has been taking Cymbalta every other day for the past 12 days and experiencing nausea everyday. Pt also stated she read the prescribing information that it can cause glaucoma, pt was wondering if there is something different she can take?

## 2018-10-04 ENCOUNTER — OFFICE VISIT (OUTPATIENT)
Dept: INTERNAL MEDICINE CLINIC | Age: 78
End: 2018-10-04

## 2018-10-04 VITALS
BODY MASS INDEX: 24.89 KG/M2 | SYSTOLIC BLOOD PRESSURE: 154 MMHG | HEART RATE: 66 BPM | TEMPERATURE: 98.1 F | HEIGHT: 65 IN | DIASTOLIC BLOOD PRESSURE: 84 MMHG | WEIGHT: 149.38 LBS | RESPIRATION RATE: 18 BRPM | OXYGEN SATURATION: 100 %

## 2018-10-04 DIAGNOSIS — M54.6 CHRONIC BILATERAL THORACIC BACK PAIN: Primary | ICD-10-CM

## 2018-10-04 DIAGNOSIS — M79.675 PAIN OF LEFT GREAT TOE: ICD-10-CM

## 2018-10-04 DIAGNOSIS — R79.89 ELEVATED FERRITIN: ICD-10-CM

## 2018-10-04 DIAGNOSIS — N39.46 MIXED STRESS AND URGE URINARY INCONTINENCE: ICD-10-CM

## 2018-10-04 DIAGNOSIS — G89.29 CHRONIC BILATERAL THORACIC BACK PAIN: Primary | ICD-10-CM

## 2018-10-04 NOTE — PROGRESS NOTES
Chief Complaint Patient presents with  Back Pain  Medication Evaluation Back pain Patient presents for follow-up on her back pain. She reports she is able to sleep at night. She reports she does not have any pain when she is sitting or at rest.  She notes that with activity involving standing she has to lean against something or back will start hurting. The pain contnues eves with her leaning on something. Recently she has trouble going to sleep at night due to the pain. Once the pain alleviates through lying down and stretching out she can go to sleep. She has still has a very difficult time being in the kitchen and doing with dishes or even preparing a small meal.  She has seen a spine specialist and they are interested in doing interventions at [facility] (burning interventions). She is concerned because she does not know if they will be effective. She tried the cymbalta it did give her some nauasea. She thinks it may have been some glaucoma and called her opthalmologist who said it was ok to take. She started the neurontin but it does not seem to help as much. Background:  She reports she had a fall several years ago and has not recovered. Her pain is severe with activity. It is localized in her upper thoracic back but fractured lumbar spine. She denies fevers night sweats or chills. She denies weight loss. She notes she is getting quite irritable with the pain. She notes that when she does move there is a sharp severe pain that radiates from her upper back around to her front of her chest. 
 
 
Fatigue She still has fatigue from interupted sleep and chronic pain Osteoporosis Patient is taking calcium and vitamin D. She is taking 500 mg of calcium and 2500 international units of vitamin D. She was on Fosamax in the past but electively took herself off.   She reports she was concerned about restarting due to her gi doctor who cautioned her against taking anything that would aggravate her esophogus and she reports with her back pain she could not sit up for an hour without severe pain. Restless legs Elevated ferritin instead of decrease. She is not on iron Right great toe pain Pt reports there is pressure on top of her great toe. She had ingrown toenails in the past and saw podiatry Incontinence Pt reports that she has developed a loss of urine control over the past year. She notes it is affecting her going out and enjoying activities. She has not done [place] exercises. She has a gynecologist and has not discussed with her. She is interested in medications but concerned about side effects. No blood in her urine, no pain on urination. Positive stress incontinence but also continual leakage. It is interfering with her sleep as well. Past Medical History:  
Diagnosis Date  Fracture of left foot 06/2018  Glaucoma Dr. Dulce Baltazar  Pap smear for cervical cancer screening 2009 neg 2010 neg 2012 neg Past Surgical History:  
Procedure Laterality Date  HX BREAST BIOPSY Left yrs ago  
 neg  HX TONSILLECTOMY Social History Social History  Marital status:  Spouse name: N/A  
 Number of children: N/A  
 Years of education: N/A Social History Main Topics  Smoking status: Never Smoker  Smokeless tobacco: Never Used  Alcohol use Yes Comment: rarely  Drug use: No  
 Sexual activity: Yes  
  Partners: Male Birth control/ protection: None Other Topics Concern  None Social History Narrative Retired from [employer]  to  healthy 2 children daughters : healthy 2 grandson's : healthy Family History Problem Relation Age of Onset  No Known Problems Mother Current Outpatient Prescriptions Medication Sig Dispense Refill  gabapentin (NEURONTIN) 100 mg capsule Take 1 Cap by mouth nightly.  After 4 nights may increase to 2 po at night. (Patient taking differently: Take 200 mg by mouth nightly. After 4 nights may increase to 2 po at night.) 60 Cap 0  
 calcium carbonate (OS-FLY) 500 mg calcium (1,250 mg) tablet Take  by mouth daily.  dorzolamide-timolol (COSOPT) 22.3-6.8 mg/mL ophthalmic solution  latanoprost (XALATAN) 0.005 % ophthalmic solution  cholecalciferol, vitamin D3, (VITAMIN D3) 2,000 unit tab Take  by mouth.  DULoxetine (CYMBALTA) 20 mg capsule Take 1 Cap by mouth daily. 30 Cap 0 No Known Allergies Review of Systems - General ROS: negative for - chills or fever Cardiovascular ROS: no chest pain or dyspnea on exertion Respiratory ROS: no cough, shortness of breath, or wheezing Visit Vitals  /84 (BP 1 Location: Left arm, BP Patient Position: Sitting)  Pulse 66  Temp 98.1 °F (36.7 °C) (Oral)  Resp 18  Ht 5' 5\" (1.651 m)  Wt 149 lb 6 oz (67.8 kg)  SpO2 100%  BMI 24.86 kg/m2 General Appearance:  Well developed, well nourished,alert and oriented x 3, and individual in no acute distress. Ears/Nose/Mouth/Throat:   Hearing grossly normal. 
  
    Neck: Supple, no lad, no bruits Chest:   Lungs clear to auscultation bilaterally. Cardiovascular:  Regular rate and rhythm, S1, S2 normal, no murmur. Abdomen:   Soft, non-tender, bowel sounds are active. Extremities: No edema bilaterally. Skin: Warm and dry, no suspicious lesions Diagnoses and all orders for this visit: 
 
1. Chronic bilateral thoracic back pain Patient with persistent chronic symptoms. She will get a second opinion with Dr. Autumn Murdock. I discussed with her and she will restart her Cymbalta at a low dose. She notes that it gave her some benefit. We can possibly go to a higher dose at 30 mg if tolerated and no side effects. 
-     REFERRAL TO PHYSICIAL MEDICINE REHAB 2. Pain of left great toe Onychomycosis -     REFERRAL TO PODIATRY 3. Mixed stress and urge urinary incontinence Patient may be a candidate for Botox therapy. She does not prefer medications 
-     REFERRAL TO UROLOGY 4. Elevated ferritin We will monitor this may be due to her chronic inflammation of her back. Osteoporosis Patient's bone density was reviewed with her again. She has a normal calcium and vitamin D. She defers Fosamax due to her GI doctor telling her not to take medications that can aggravate her esophagus and further she would not be able to sit for prolonged period of time. She has not followed up with Dr. Eliot Salomon. I encouraged her to follow-up with endocrine as I think that she does need medications due to her risk for fracture. This note will not be viewable in [address]

## 2018-10-21 RX ORDER — GABAPENTIN 100 MG/1
CAPSULE ORAL
Qty: 60 CAP | Refills: 0 | Status: SHIPPED | OUTPATIENT
Start: 2018-10-21 | End: 2019-03-20 | Stop reason: ALTCHOICE

## 2018-10-22 DIAGNOSIS — G89.29 CHRONIC MIDLINE THORACIC BACK PAIN: ICD-10-CM

## 2018-10-22 DIAGNOSIS — M54.6 CHRONIC MIDLINE THORACIC BACK PAIN: ICD-10-CM

## 2018-10-23 RX ORDER — DULOXETIN HYDROCHLORIDE 20 MG/1
CAPSULE, DELAYED RELEASE ORAL
Qty: 30 CAP | Refills: 0 | Status: SHIPPED | OUTPATIENT
Start: 2018-10-23 | End: 2018-11-08

## 2018-11-08 ENCOUNTER — TELEPHONE (OUTPATIENT)
Dept: INTERNAL MEDICINE CLINIC | Age: 78
End: 2018-11-08

## 2018-11-08 DIAGNOSIS — G89.29 CHRONIC MIDLINE THORACIC BACK PAIN: ICD-10-CM

## 2018-11-08 DIAGNOSIS — M54.6 CHRONIC MIDLINE THORACIC BACK PAIN: ICD-10-CM

## 2018-11-08 RX ORDER — DULOXETIN HYDROCHLORIDE 20 MG/1
20 CAPSULE, DELAYED RELEASE ORAL 2 TIMES DAILY
Qty: 180 CAP | Refills: 0 | Status: SHIPPED | OUTPATIENT
Start: 2018-11-08 | End: 2019-03-20 | Stop reason: ALTCHOICE

## 2018-11-08 NOTE — TELEPHONE ENCOUNTER
Patient called to ask that we fill new RX for   DULoxetine 20 mg capsule for twice a day      Pt stated she is currently on 1 a day. Dr. Seth Milner told her if she thought 2 would be better a day just to call and let her know.      [id number] [place] [address] [phone]

## 2018-11-12 NOTE — TELEPHONE ENCOUNTER
I called pt back to advise pcp sent increase dose to pharmacy. Pt states she got her rx but she has been noticing since been on DULoxetine her hair has been falling out, she read it could be a side effect. Pt wants to ask PCP what should she do?

## 2018-11-12 NOTE — TELEPHONE ENCOUNTER
Does not seem to be a common side effect but possibly can happen.  She can decrease back to the 1 tablet and we should discuss at her next visit. Rosalinda Simmons

## 2018-11-13 NOTE — TELEPHONE ENCOUNTER
Pt was informed and appt was schedule with PCP on 11/26/18, pt states she has stopped her Cymbalta, her last tablet was Sunday and she is doing fine.

## 2018-11-13 NOTE — TELEPHONE ENCOUNTER
Please let her know I can see her next week. I was not aware she had hair loss with the 1 tablet so we increased to two tabs. I can see her but she may want to see dermatology as well. It may be stress induced or even a female pattern baldness if there is any family history.

## 2019-02-24 ENCOUNTER — HOSPITAL ENCOUNTER (OUTPATIENT)
Dept: MRI IMAGING | Age: 79
Discharge: HOME | End: 2019-02-24
Attending: GENERAL PRACTICE
Payer: MEDICARE

## 2019-02-24 DIAGNOSIS — M25.511 RIGHT SHOULDER PAIN: ICD-10-CM

## 2019-02-24 DIAGNOSIS — M75.121 COMPLETE TEAR OF RIGHT ROTATOR CUFF: ICD-10-CM

## 2019-02-24 DIAGNOSIS — M19.011 ARTHRITIS OF RIGHT SHOULDER REGION: ICD-10-CM

## 2019-02-24 PROCEDURE — 73221 MRI JOINT UPR EXTREM W/O DYE: CPT

## 2019-03-20 ENCOUNTER — OFFICE VISIT (OUTPATIENT)
Dept: INTERNAL MEDICINE CLINIC | Age: 79
End: 2019-03-20

## 2019-03-20 VITALS
TEMPERATURE: 97.5 F | SYSTOLIC BLOOD PRESSURE: 151 MMHG | DIASTOLIC BLOOD PRESSURE: 81 MMHG | HEIGHT: 65 IN | OXYGEN SATURATION: 98 % | HEART RATE: 72 BPM | WEIGHT: 149.8 LBS | BODY MASS INDEX: 24.96 KG/M2 | RESPIRATION RATE: 16 BRPM

## 2019-03-20 DIAGNOSIS — Z13.31 SCREENING FOR DEPRESSION: ICD-10-CM

## 2019-03-20 DIAGNOSIS — Z12.31 ENCOUNTER FOR SCREENING MAMMOGRAM FOR MALIGNANT NEOPLASM OF BREAST: ICD-10-CM

## 2019-03-20 DIAGNOSIS — M94.9 DISORDER OF BONE AND CARTILAGE: ICD-10-CM

## 2019-03-20 DIAGNOSIS — Z13.39 SCREENING FOR ALCOHOLISM: ICD-10-CM

## 2019-03-20 DIAGNOSIS — M54.50 CHRONIC BILATERAL LOW BACK PAIN WITHOUT SCIATICA: ICD-10-CM

## 2019-03-20 DIAGNOSIS — R32 URINARY INCONTINENCE, UNSPECIFIED TYPE: ICD-10-CM

## 2019-03-20 DIAGNOSIS — G89.29 CHRONIC BILATERAL LOW BACK PAIN WITHOUT SCIATICA: ICD-10-CM

## 2019-03-20 DIAGNOSIS — M89.9 DISORDER OF BONE AND CARTILAGE: ICD-10-CM

## 2019-03-20 DIAGNOSIS — I10 WHITE COAT SYNDROME WITH DIAGNOSIS OF HYPERTENSION: ICD-10-CM

## 2019-03-20 DIAGNOSIS — Z12.11 COLON CANCER SCREENING: ICD-10-CM

## 2019-03-20 DIAGNOSIS — Z00.00 MEDICARE ANNUAL WELLNESS VISIT, SUBSEQUENT: Primary | ICD-10-CM

## 2019-03-20 RX ORDER — AZELASTINE HCL 205.5 UG/1
SPRAY NASAL 2 TIMES DAILY
COMMUNITY
End: 2021-04-15

## 2019-03-20 NOTE — PROGRESS NOTES
Jim Pink is a 70 y.o. female Chief Complaint Patient presents with [facility] Annual Wellness Visit Visit Vitals /81 (BP 1 Location: Left arm, BP Patient Position: Sitting) Pulse 72 Temp 97.5 °F (36.4 °C) (Oral) Resp 16 Ht 5' 5\" (1.651 m) Wt 149 lb 12.8 oz (67.9 kg) SpO2 98% BMI 24.93 kg/m² Health Maintenance Due Topic Date Due  Shingrix Vaccine Age 50> (1 of 2) 11/03/1992  Pneumococcal 65+ years (1 of 2 - PCV13) 01/17/2014  Influenza Age 5 to Adult  08/01/2018  MEDICARE YEARLY EXAM  10/13/2018 1. Have you been to the ER, urgent care clinic since your last visit? Hospitalized since your last visit? No 
 
2. Have you seen or consulted any other health care providers outside of the [address] [place] [address] since your last visit? Include any pap smears or colon screening. No 
 
Patient stated she was nervous so her Blood Pressure is slightly elevated.

## 2019-03-20 NOTE — PATIENT INSTRUCTIONS
Medicare Wellness Visit, Female The best way to live healthy is to have a lifestyle where you eat a well-balanced diet, exercise regularly, limit alcohol use, and quit all forms of tobacco/nicotine, if applicable. Regular preventive services are another way to keep healthy. Preventive services (vaccines, screening tests, monitoring & exams) can help personalize your care plan, which helps you manage your own care. Screening tests can find health problems at the earliest stages, when they are easiest to treat. [facility] follows the current, evidence-based guidelines published by the [facility] ([facility]) when recommending preventive services for our patients. Because we follow these guidelines, sometimes recommendations change over time as research supports it. (For example, mammograms used to be recommended annually. Even though Medicare will still pay for an annual mammogram, the newer guidelines recommend a mammogram every two years for women of average risk.) Of course, you and your doctor may decide to screen more often for some diseases, based on your risk and your health status. Preventive services for you include: - Medicare offers their members a free annual wellness visit, which is time for you and your primary care provider to discuss and plan for your preventive service needs. Take advantage of this benefit every year! 
-All adults over the age of 72 should receive the recommended pneumonia vaccines. Current USPSTF guidelines recommend a series of two vaccines for the best pneumonia protection.  
-All adults should have a flu vaccine yearly and a tetanus vaccine every 10 years. All adults age 61 and older should receive a shingles vaccine once in their lifetime.   
-A bone mass density test is recommended when a woman turns 65 to screen for osteoporosis. This test is only recommended one time, as a screening. Some providers will use this same test as a disease monitoring tool if you already have osteoporosis. -All adults age 38-68 who are overweight should have a diabetes screening test once every three years.  
-Other screening tests and preventive services for persons with diabetes include: an eye exam to screen for diabetic retinopathy, a kidney function test, a foot exam, and stricter control over your cholesterol.  
-Cardiovascular screening for adults with routine risk involves an electrocardiogram (ECG) at intervals determined by your doctor.  
-Colorectal cancer screenings should be done for adults age 54-65 with no increased risk factors for colorectal cancer. There are a number of acceptable methods of screening for this type of cancer. Each test has its own benefits and drawbacks. Discuss with your doctor what is most appropriate for you during your annual wellness visit. The different tests include: colonoscopy (considered the best screening method), a fecal occult blood test, a fecal DNA test, and sigmoidoscopy. -Breast cancer screenings are recommended every other year for women of normal risk, age 54-69. 
-Cervical cancer screenings for women over age 72 are only recommended with certain risk factors.  
-All adults born between [facility] should be screened once for Hepatitis C. Here is a list of your current Health Maintenance items (your personalized list of preventive services) with a due date: 
Health Maintenance Due Topic Date Due  Shingles Vaccine (1 of 2) 11/03/1992  Pneumococcal Vaccine (1 of 2 - PCV13) 01/17/2014  Flu Vaccine  08/01/2018 [facility] Annual Well Visit  10/13/2018

## 2019-03-20 NOTE — PROGRESS NOTES
Patient presents for her Medicare wellness visit and then to discuss osteoporosis as well as some urinary incontinence. This is the Subsequent Medicare Annual Wellness Exam, performed 12 months or more after the Initial AWV or the last Subsequent AWV I have reviewed the patient's medical history in detail and updated the computerized patient record. History Past Medical History:  
Diagnosis Date  Fracture of left foot 06/2018  Glaucoma Dr. Keri Engle  Pap smear for cervical cancer screening 2009 neg 2010 neg 2012 neg  Right shoulder pain Past Surgical History:  
Procedure Laterality Date  HX BREAST BIOPSY Left yrs ago  
 neg  HX TONSILLECTOMY Current Outpatient Medications Medication Sig Dispense Refill  azelastine (ASTEPRO) 0.15 % (205.5 mcg) two (2) times a day.  phenylephrine (NEOSYNEPHRINE) 0.5 % spry 1 Spray every six (6) hours as needed.  pneumococcal 13 van conj dip (PREVNAR 13, PF,) 0.5 mL syrg injection 0.5 mL by IntraMUSCular route once for 1 dose. 0.5 mL 0  
 varicella-zoster recombinant, PF, (SHINGRIX, PF,) 50 mcg/0.5 mL susr injection 0.5mL by IntraMUSCular route once now and then repeat in 2-6 months 0.5 mL 1  calcium carbonate (OS-FLY) 500 mg calcium (1,250 mg) tablet Take  by mouth daily.  dorzolamide-timolol (COSOPT) 22.3-6.8 mg/mL ophthalmic solution  latanoprost (XALATAN) 0.005 % ophthalmic solution  cholecalciferol, vitamin D3, (VITAMIN D3) 2,000 unit tab Take  by mouth. No Known Allergies Family History Problem Relation Age of Onset  No Known Problems Mother Social History Tobacco Use  Smoking status: Never Smoker  Smokeless tobacco: Never Used Substance Use Topics  Alcohol use: Yes Alcohol/week: 0.6 oz Types: 1 Glasses of wine per week Frequency: 2-3 times a week Comment: rarely Patient Active Problem List  
Diagnosis Code  Advanced care planning/counseling discussion Z71.89 Depression Risk Factor Screening:  
 
3 most recent PHQ Screens 3/20/2019 Little interest or pleasure in doing things Not at all Feeling down, depressed, irritable, or hopeless Not at all Total Score PHQ 2 0 Alcohol Risk Factor Screening: You do not drink alcohol or very rarely. Functional Ability and Level of Safety:  
Hearing Loss Hearing is good. Activities of Daily Living The home contains: no safety equipment. Patient does total self care Fall Risk Fall Risk Assessment, last 12 mths 3/20/2019 Able to walk? Yes Fall in past 12 months? No  
Fall with injury? -  
Number of falls in past 12 months - Fall Risk Score -  
 
 
Abuse Screen Patient is not abused Cognitive Screening Evaluation of Cognitive Function: 
Has your family/caregiver stated any concerns about your memory: no 
Normal 
 
Patient Care Team  
Patient Care Team: 
Leilani Garcia MD as PCP - General (Internal Medicine) Edelmira Velarde MD as Physician (Obstetrics & Gynecology) Assessment/Plan Education and counseling provided: 
End-of-Life planning (with patient's consent) patient will be given advanced care directives/blue folder Pneumococcal Vaccine patient is due for Prevnar 13 this prescription was written Influenza Vaccine patient is up to date with her influenza vaccines Screening Mammography mammogram order was done as she is due Colorectal cancer screening tests patient will have a fit test 
Bone mass measurement (DEXA) patient's last bone density was in July 2017. She may need to be due in 2019 July Screening for glaucoma patient has a follow-up with her ophthalmologist.  She sees him every summer. Diagnoses and all orders for this visit: 
 
 
 
2. Medicare annual wellness visit, subsequent -     pneumococcal 13 van conj dip (PREVNAR 13, PF,) 0.5 mL syrg injection; 0.5 mL by IntraMUSCular route once for 1 dose. -     varicella-zoster recombinant, PF, (SHINGRIX, PF,) 50 mcg/0.5 mL susr injection; 0.5mL by IntraMUSCular route once now and then repeat in 2-6 months 
-     OCCULT BLOOD IMMUNOASSAY,DIAGNOSTIC; Future 3. Screening for alcoholism -     [place] ANNUAL ALCOHOL SCREEN 15 MIN 4. Screening for depression 
-     [place] [id number] 5. Encounter for screening mammogram for malignant neoplasm of breast 
-     [facility] MAMMO BI SCREENING INCL CAD; Future 7. Colon cancer screening Health Maintenance Due Topic Date Due  Shingrix Vaccine Age 50> (1 of 2) 11/03/1992  Pneumococcal 65+ years (1 of 2 - PCV13) 01/17/2014  Influenza Age 5 to Adult  08/01/2018  MEDICARE YEARLY EXAM  10/13/2018 Chief Complaint Patient presents with [facility] Annual Wellness Visit Patient presents for her Medicare wellness visit please see above. She also is here to follow-up with regards to osteoporosis, back pain incontinence. Subjective:  
Chiki Jo is a 70 y.o. female with a blood pressure without a diagnosis of hypertension. Hypertension ROS:  no TIA's, no chest pain on exertion, no dyspnea on exertion, no swelling of ankles. New concerns: Patient reports her home blood pressure readings are in the 614-164 range systolic. She reports she has whitecoat hypertension. She notes that she has some back pain and sometimes has difficulty sleeping which may be elevating her blood pressure. Osteoporosis Patient has not followed up with endocrinology yet. She was told she could not take Fosamax due to esophageal issues. She is currently taking 4000 international units of vitamin D3. She has been taking this for several years. Labs reviewed with her and her vitamin D level is stable. Of note, the patient reports she was sitting at the table and her leg was numb. When she stood up she fell down because her leg was numb but she fractured her left ankle and foot. Incontinence Incontinence patient has been having incontinence for over 2 years. She wears a pad. She prefers not to be on any medication for this if possible. Back pain Patient has a history of trauma where she fell and severely injured her back. She is not a candidate for surgery. She reports she has minimal activity due to her decreased ability to stand. Her friend gave her some CBD oil. She has not tried this yet but is very interested in it. Past Medical History:  
Diagnosis Date  Fracture of left foot 06/2018  Glaucoma Dr. Owen Seip  Pap smear for cervical cancer screening 2009 neg 2010 neg 2012 neg  Right shoulder pain Past Surgical History:  
Procedure Laterality Date  HX BREAST BIOPSY Left yrs ago  
 neg  HX TONSILLECTOMY Social History Socioeconomic History  Marital status:  Spouse name: Not on file  Number of children: Not on file  Years of education: Not on file  Highest education level: Not on file Tobacco Use  Smoking status: Never Smoker  Smokeless tobacco: Never Used Substance and Sexual Activity  Alcohol use: Yes Alcohol/week: 0.6 oz Types: 1 Glasses of wine per week Frequency: 2-3 times a week Comment: rarely  Drug use: No  
 Sexual activity: Yes  
  Partners: Male Birth control/protection: None Social History Narrative Retired from [employer]  to  healthy 2 children daughters : healthy 2 grandson's : healthy Family History Problem Relation Age of Onset  No Known Problems Mother Current Outpatient Medications Medication Sig Dispense Refill  azelastine (ASTEPRO) 0.15 % (205.5 mcg) two (2) times a day.  phenylephrine (NEOSYNEPHRINE) 0.5 % spry 1 Spray every six (6) hours as needed.  pneumococcal 13 van conj dip (PREVNAR 13, PF,) 0.5 mL syrg injection 0.5 mL by IntraMUSCular route once for 1 dose.  0.5 mL 0  
  varicella-zoster recombinant, PF, (SHINGRIX, PF,) 50 mcg/0.5 mL susr injection 0.5mL by IntraMUSCular route once now and then repeat in 2-6 months 0.5 mL 1  calcium carbonate (OS-FLY) 500 mg calcium (1,250 mg) tablet Take  by mouth daily.  dorzolamide-timolol (COSOPT) 22.3-6.8 mg/mL ophthalmic solution  latanoprost (XALATAN) 0.005 % ophthalmic solution  cholecalciferol, vitamin D3, (VITAMIN D3) 2,000 unit tab Take  by mouth. No Known Allergies Review of Systems - General ROS: positive for  - fatigue 
negative for - chills or fever Cardiovascular ROS: no chest pain or dyspnea on exertion Respiratory ROS: no cough, shortness of breath, or wheezing Visit Vitals /81 (BP 1 Location: Left arm, BP Patient Position: Sitting) Pulse 72 Temp 97.5 °F (36.4 °C) (Oral) Resp 16 Ht 5' 5\" (1.651 m) Wt 149 lb 12.8 oz (67.9 kg) SpO2 98% BMI 24.93 kg/m² General Appearance:  Well developed, well nourished,alert and oriented x 3, and individual in no acute distress. Ears/Nose/Mouth/Throat:   Hearing grossly normal. 
  
    Neck: Supple, no lad, no bruits Chest:   Lungs clear to auscultation bilaterally. Cardiovascular:  Regular rate and rhythm, S1, S2 normal, no murmur. Abdomen:   Soft, non-tender, bowel sounds are active. Extremities: No edema bilaterally. Skin: Warm and dry, no suspicious lesions Diagnoses and all orders for this visit: 1. Disorder of bone and cartilage Patient has osteoporosis. She sustained a ground-level fall when standing from seated position. Given that she cannot take Fosamax I will have her see an endocrinologist 
-     [place]; Future 
-     REFERRAL TO ENDOCRINOLOGY 
-     METABOLIC PANEL, COMPREHENSIVE 6. Urinary incontinence, unspecified type We will check baseline labs. She may be a candidate for Botox. If symptoms persist and would like to pursue I can have her see Dr. Johan Everett -     URINALYSIS W/ RFLX MICROSCOPIC 
-     CULTURE, URINE 
-     METABOLIC PANEL, COMPREHENSIVE 7. Colon cancer screening 8. White coat syndrome with diagnosis of hypertension Recheck of blood pressure 150/80. She notes whitecoat hypertension. I asked for her to check her blood pressure at home. 9. Chronic bilateral low back pain without sciatica Patient has questions about CBD oil. I think she would be a candidate for this. It may help her with her pain as well as sleep. I discussed with her that she will need a written certificate from me and will need to register herself with the board of pharmacy. It was clear to her  she that she would need to get her CBD oil from a [employer]. Aside from patient's Medicare wellness visit, I spent an additional 15 minutes with this patient greater than 50% of the time spent in management and counseling with regards to her osteoporosis, chronic back pain, incontinence, and guidance with regards to her blood pressure. We will continue to monitor her blood pressure closely as she may need medication.

## 2019-03-21 LAB
ALBUMIN SERPL-MCNC: 4.7 G/DL (ref 3.5–4.8)
ALBUMIN/GLOB SERPL: 1.7 {RATIO} (ref 1.2–2.2)
ALP SERPL-CCNC: 57 IU/L (ref 39–117)
ALT SERPL-CCNC: 16 IU/L (ref 0–32)
APPEARANCE UR: CLEAR
AST SERPL-CCNC: 21 IU/L (ref 0–40)
BACTERIA #/AREA URNS HPF: ABNORMAL /[HPF]
BILIRUB SERPL-MCNC: 0.7 MG/DL (ref 0–1.2)
BILIRUB UR QL STRIP: NEGATIVE
BUN SERPL-MCNC: 22 MG/DL (ref 8–27)
BUN/CREAT SERPL: 28 (ref 12–28)
CALCIUM SERPL-MCNC: 9.8 MG/DL (ref 8.7–10.3)
CASTS URNS QL MICRO: ABNORMAL /LPF
CHLORIDE SERPL-SCNC: 99 MMOL/L (ref 96–106)
CO2 SERPL-SCNC: 25 MMOL/L (ref 20–29)
COLOR UR: YELLOW
CREAT SERPL-MCNC: 0.78 MG/DL (ref 0.57–1)
EPI CELLS #/AREA URNS HPF: ABNORMAL /HPF (ref 0–10)
GLOBULIN SER CALC-MCNC: 2.8 G/DL (ref 1.5–4.5)
GLUCOSE SERPL-MCNC: 115 MG/DL (ref 65–99)
GLUCOSE UR QL: NEGATIVE
HGB UR QL STRIP: NEGATIVE
KETONES UR QL STRIP: NEGATIVE
LEUKOCYTE ESTERASE UR QL STRIP: ABNORMAL
MICRO URNS: ABNORMAL
MUCOUS THREADS URNS QL MICRO: PRESENT
NITRITE UR QL STRIP: NEGATIVE
PH UR STRIP: 6.5 [PH] (ref 5–7.5)
POTASSIUM SERPL-SCNC: 4.4 MMOL/L (ref 3.5–5.2)
PROT SERPL-MCNC: 7.5 G/DL (ref 6–8.5)
PROT UR QL STRIP: NEGATIVE
RBC #/AREA URNS HPF: ABNORMAL /HPF (ref 0–2)
SODIUM SERPL-SCNC: 140 MMOL/L (ref 134–144)
SP GR UR: 1.02 (ref 1–1.03)
UROBILINOGEN UR STRIP-MCNC: 1 MG/DL (ref 0.2–1)
WBC #/AREA URNS HPF: ABNORMAL /HPF (ref 0–5)

## 2019-03-22 LAB — BACTERIA UR CULT: NO GROWTH

## 2019-04-03 LAB — HEMOCCULT STL QL IA: NEGATIVE

## 2019-04-24 ENCOUNTER — TELEPHONE (OUTPATIENT)
Dept: INTERNAL MEDICINE CLINIC | Age: 79
End: 2019-04-24

## 2019-04-24 NOTE — TELEPHONE ENCOUNTER
Patient called asking that a nurse please call her about new vitamins. Stated that she isn't sure how much of it to take. Calcium w/ Vit D3    Please call patient to advise.  [phone]

## 2019-04-24 NOTE — TELEPHONE ENCOUNTER
I returned call to pt, she states she needs clarification on how much calcium and vitamin D3 to take. The new vitamins she got has 600mg calcium and 800 units of D3 per pill She thought she needed more calcium as she has osteoporosis but wanted to make sure this would be enough, if she should take 2 pills, or buy additional supplements. Please advise.

## 2019-04-24 NOTE — TELEPHONE ENCOUNTER
Returned call to pt and advised her to take 2 pills daily per Dr Kat Sanford. Pt voiced understanding and thanks.

## 2019-05-30 ENCOUNTER — TELEPHONE (OUTPATIENT)
Dept: INTERNAL MEDICINE CLINIC | Age: 79
End: 2019-05-30

## 2019-05-30 NOTE — TELEPHONE ENCOUNTER
Returned call to pt, she states she has been feeling fatigue, not sleeping well, little energy in the afternoon, had a moment of dizziness and vommiting, feels \"loopy\", numb fingertips at times, and strange bruises on her hands.  Appt scheduled for pt Wednesday, June 12, 2019 02:20 PM. Pt thanks and disconnects the call

## 2019-06-07 ENCOUNTER — TELEPHONE (OUTPATIENT)
Dept: INTERNAL MEDICINE CLINIC | Age: 79
End: 2019-06-07

## 2019-06-07 NOTE — TELEPHONE ENCOUNTER
Returned call to pt, advised her that she does not need to keep both appts. She states she will keep her appt with Dr Barbi King on Aug 7th and will cancel her other appt.

## 2019-06-07 NOTE — TELEPHONE ENCOUNTER
Patient called to report that she somehow has a appointment with 2 different doctors. Patient reports that DR. Kasie Devries wanted her to see a endo doctor. Patient would like to know which doctor DR. Zenobia Devries would like her to keep appointment with.     Randell Maloney or Dr. Monette Denver? Please call patient to advise. Patient asked that you leave doctors name on VM if not able to answer phone.        [phone]

## 2019-06-20 DIAGNOSIS — Z00.00 MEDICARE ANNUAL WELLNESS VISIT, SUBSEQUENT: ICD-10-CM

## 2019-07-29 ENCOUNTER — HOSPITAL ENCOUNTER (OUTPATIENT)
Dept: MAMMOGRAPHY | Age: 79
Discharge: HOME | End: 2019-07-29
Attending: INTERNAL MEDICINE
Payer: MEDICARE

## 2019-07-29 DIAGNOSIS — M89.9 DISORDER OF BONE AND CARTILAGE: ICD-10-CM

## 2019-07-29 DIAGNOSIS — M94.9 DISORDER OF BONE AND CARTILAGE: ICD-10-CM

## 2019-07-29 DIAGNOSIS — Z12.31 ENCOUNTER FOR SCREENING MAMMOGRAM FOR MALIGNANT NEOPLASM OF BREAST: ICD-10-CM

## 2019-07-29 PROCEDURE — 77080 DXA BONE DENSITY AXIAL: CPT

## 2019-07-29 PROCEDURE — 77067 SCR MAMMO BI INCL CAD: CPT

## 2019-08-08 ENCOUNTER — OFFICE VISIT (OUTPATIENT)
Dept: INTERNAL MEDICINE CLINIC | Age: 79
End: 2019-08-08

## 2019-08-08 VITALS
WEIGHT: 147.6 LBS | BODY MASS INDEX: 24.59 KG/M2 | HEART RATE: 69 BPM | RESPIRATION RATE: 16 BRPM | SYSTOLIC BLOOD PRESSURE: 131 MMHG | TEMPERATURE: 98.6 F | OXYGEN SATURATION: 100 % | DIASTOLIC BLOOD PRESSURE: 72 MMHG | HEIGHT: 65 IN

## 2019-08-08 DIAGNOSIS — I10 ESSENTIAL HYPERTENSION: ICD-10-CM

## 2019-08-08 DIAGNOSIS — Z23 ENCOUNTER FOR IMMUNIZATION: ICD-10-CM

## 2019-08-08 DIAGNOSIS — G89.29 CHRONIC BILATERAL LOW BACK PAIN WITHOUT SCIATICA: Primary | ICD-10-CM

## 2019-08-08 DIAGNOSIS — M80.00XD AGE-RELATED OSTEOPOROSIS WITH CURRENT PATHOLOGICAL FRACTURE WITH ROUTINE HEALING, SUBSEQUENT ENCOUNTER: ICD-10-CM

## 2019-08-08 DIAGNOSIS — M54.50 CHRONIC BILATERAL LOW BACK PAIN WITHOUT SCIATICA: Primary | ICD-10-CM

## 2019-08-08 RX ORDER — METRONIDAZOLE 7.5 MG/G
CREAM TOPICAL 2 TIMES DAILY
COMMUNITY
End: 2020-06-08 | Stop reason: ALTCHOICE

## 2019-08-08 RX ORDER — MULTIVITAMIN
1 TABLET ORAL DAILY
COMMUNITY
End: 2021-04-15

## 2019-08-08 NOTE — PROGRESS NOTES
Patient presents for her Medicare wellness visit and then to discuss osteoporosis as well as some urinary incontinence. Chief Complaint   Patient presents with    Back Pain     pt in office to discuss medication for chronic back pain        Back pain  She reports severe back pain. She reports if sits or lays down pain will resolve. Any type of standing triggers dull pain. It makes her want to sit town. She notes standing time varies but max of 1/2 hour standing. It prevents her from doing actities. She notes the pain has progressed from the evening but now starts in the am.  She reports 2 years ago had xray and back MRI. She does not recall a dx of stenosis but fracture. She wore a brace. She reports she thinks she had a fracture 20 years ago  She reports pain starts in the middle of her back and then will surround her around ribs but when sits immediately resolve in 10 minutes. Standing or leaning forward worse but if supports leaning arms on sink it helps. The arms take the pressure off the spine. Her past specialists included:  Dr. Bin Colvin nerve interventions 2 years ago. She did deferred having her nerves burned    She had an MRI in September 2018. Please see [email or web address] media  She has a diagnosis of mild S-shaped scoliosis, severe chronic compression fracture at L1, disc protrusions at T9-T10-T11 and T12. She has a significant protrusion at T10-11. This protrusion is minimally deforming the ventral cord, moderate to severe neuro foraminal narrowing and mild spinal canal stenosis at T11 and T12. Osteoporosis  Pt recently saw Dr. Lillie Ramírez and prolia  Pt reports the side effects of prolia are too much and really does not want to do it. [facility]  Sp[facility] foot    Subjective:   Navin Kam is a 70 y.o. female with a blood pressure without a diagnosis of hypertension.   Hypertension ROS:  no TIA's, no chest pain on exertion, no dyspnea on exertion, no swelling of ankles. New concerns: Patient reports her home blood pressure readings are in the 120 range. Past Medical History:   Diagnosis Date    Fracture of left foot 06/2018    Glaucoma     Dr. Sharp Shed    Pap smear for cervical cancer screening     2009 neg 2010 neg 2012 neg    Right shoulder pain      Past Surgical History:   Procedure Laterality Date    HX BREAST BIOPSY Left yrs ago    neg    HX TONSILLECTOMY       Social History     Socioeconomic History    Marital status:      Spouse name: Not on file    Number of children: Not on file    Years of education: Not on file    Highest education level: Not on file   Tobacco Use    Smoking status: Never Smoker    Smokeless tobacco: Never Used   Substance and Sexual Activity    Alcohol use: Yes     Alcohol/week: 1.0 standard drinks     Types: 1 Glasses of wine per week     Frequency: 2-3 times a week     Comment: rarely    Drug use: No    Sexual activity: Yes     Partners: Male     Birth control/protection: None   Social History Narrative    Retired from [employer]         to  healthy    2 children daughters : healthy    2 grandson's : healthy     Family History   Problem Relation Age of Onset    No Known Problems Mother      Current Outpatient Medications   Medication Sig Dispense Refill    metroNIDAZOLE (METROCREAM) 0.75 % topical cream Apply  to affected area two (2) times a day. Use a thin layer to affected areas after washing      calcium-cholecalciferol, D3, (CALTRATE 600+D) tablet Take 1 Tab by mouth daily.  dorzolamide-timolol (COSOPT) 22.3-6.8 mg/mL ophthalmic solution Administer 1 Drop to both eyes two (2) times a day.  latanoprost (XALATAN) 0.005 % ophthalmic solution Administer 1 Drop to both eyes daily.  azelastine (ASTEPRO) 0.15 % (205.5 mcg) two (2) times a day.  phenylephrine (NEOSYNEPHRINE) 0.5 % spry 1 Spray every six (6) hours as needed.       varicella-zoster recombinant, PF, (SHINGRIX, PF,) 50 mcg/0.5 mL susr injection 0.5mL by IntraMUSCular route once now and then repeat in 2-6 months 0.5 mL 1    calcium carbonate (OS-FLY) 500 mg calcium (1,250 mg) tablet Take  by mouth daily.  cholecalciferol, vitamin D3, (VITAMIN D3) 2,000 unit tab Take  by mouth. No Known Allergies    Review of Systems - General ROS: positive for  - fatigue  negative for - chills or fever  Cardiovascular ROS: no chest pain or dyspnea on exertion  Respiratory ROS: no cough, shortness of breath, or wheezing    Visit Vitals  /72 (BP 1 Location: Left arm, BP Patient Position: Sitting)   Pulse 69   Temp 98.6 °F (37 °C) (Oral)   Resp 16   Ht 5' 5\" (1.651 m)   Wt 147 lb 9.6 oz (67 kg)   SpO2 100%   BMI 24.56 kg/m²     General Appearance:  Well developed, well nourished,alert and oriented x 3, and individual in no acute distress. Ears/Nose/Mouth/Throat:   Hearing grossly normal.         Neck: Supple, no lad, no bruits   Chest:   Lungs clear to auscultation bilaterally. Cardiovascular:  Regular rate and rhythm, S1, S2 normal, no murmur. Abdomen:   Soft, non-tender, bowel sounds are active. Extremities: No edema bilaterally. Skin: Warm and dry, no suspicious lesions                 Diagnoses and all orders for this visit:    1. Chronic bilateral low back pain without sciatica  Patient has seen multiple specialists, she defers pain medication. Salient parts of history is that she is able to stand for 30 minutes but then develops pain. She gets relief when she offloads her spine and leads on her arms. She is able to recover 10 minutes after sitting. She has seen for physical therapists. I discussed her MRI that Dr. Miguel Avila did with her. She is interested in another reevaluation by PT to strengthen her core and possibly her paraspinal muscles. She prefers not to be on medication.   She did not return to Dr. Miguel Avila as he offered her an intervention that she was afraid would have too many complications.  -     REFERRAL TO PHYSICAL THERAPY    2. Encounter for immunization  -     pneumococcal 13 van conj dip (PREVNAR-13) 0.5 mL syrg injection; 0.5 mL by IntraMUSCular route once for 1 dose. 3. Essential hypertension  Stable continue meds    4. Age-related osteoporosis with current pathological fracture with routine healing, subsequent encounter  Discussion with patient about her bone density. I reviewed this bone density with her. We also reviewed Prolia which Dr. Yaniv Pagan recommended. I recommended that she initiate possibly Reclast or Prolia. She is adamant that these medications may be worse for her. She is aware that she may have another fracture. I discussed that she may have a fracture again in her spine or in her hip. She finds consequences of not being treated is less than the risk that she may have with medications. Patient will follow-up with me in 3 months. She does not need to have her Medicare wellness until March 2020. I spent 40 minutes with this patient greater than 50% of the time was spent in management and counseling with regards to her back pain chronic and osteoporosis. Empathetic listening was provided but solutions were also discussed with regards to potential physical therapy options and behavioral ways for osteoporosis prevention. I do recommend that she take the osteoporosis medication but she still defers. We will continue to monitor. She may also learn some physical therapy to help with her osteoporosis to prevent falls and strength in her proximal muscles.

## 2019-08-08 NOTE — PATIENT INSTRUCTIONS
DASH Diet: Care Instructions Your Care Instructions The DASH diet is an eating plan that can help lower your blood pressure. DASH stands for Dietary Approaches to Stop Hypertension. Hypertension is high blood pressure. The DASH diet focuses on eating foods that are high in calcium, potassium, and magnesium. These nutrients can lower blood pressure. The foods that are highest in these nutrients are fruits, vegetables, low-fat dairy products, nuts, seeds, and legumes. But taking calcium, potassium, and magnesium supplements instead of eating foods that are high in those nutrients does not have the same effect. The DASH diet also includes whole grains, fish, and poultry. The DASH diet is one of several lifestyle changes your doctor may recommend to lower your high blood pressure. Your doctor may also want you to decrease the amount of sodium in your diet. Lowering sodium while following the DASH diet can lower blood pressure even further than just the DASH diet alone. Follow-up care is a key part of your treatment and safety. Be sure to make and go to all appointments, and call your doctor if you are having problems. It's also a good idea to know your test results and keep a list of the medicines you take. How can you care for yourself at home? Following the DASH diet · Eat 4 to 5 servings of fruit each day. A serving is 1 medium-sized piece of fruit, ½ cup chopped or canned fruit, 1/4 cup dried fruit, or 4 ounces (½ cup) of fruit juice. Choose fruit more often than fruit juice. · Eat 4 to 5 servings of vegetables each day. A serving is 1 cup of lettuce or raw leafy vegetables, ½ cup of chopped or cooked vegetables, or 4 ounces (½ cup) of vegetable juice. Choose vegetables more often than vegetable juice. · Get 2 to 3 servings of low-fat and fat-free dairy each day. A serving is 8 ounces of milk, 1 cup of yogurt, or 1 ½ ounces of cheese. · Eat 6 to 8 servings of grains each day. A serving is 1 slice of bread, 1 ounce of dry cereal, or ½ cup of cooked rice, pasta, or cooked cereal. Try to choose whole-grain products as much as possible. · Limit lean meat, poultry, and fish to 2 servings each day. A serving is 3 ounces, about the size of a deck of cards. · Eat 4 to 5 servings of nuts, seeds, and legumes (cooked dried beans, lentils, and split peas) each week. A serving is 1/3 cup of nuts, 2 tablespoons of seeds, or ½ cup of cooked beans or peas. · Limit fats and oils to 2 to 3 servings each day. A serving is 1 teaspoon of vegetable oil or 2 tablespoons of salad dressing. · Limit sweets and added sugars to 5 servings or less a week. A serving is 1 tablespoon jelly or jam, ½ cup sorbet, or 1 cup of lemonade. · Eat less than 2,300 milligrams (mg) of sodium a day. If you limit your sodium to 1,500 mg a day, you can lower your blood pressure even more. Tips for success · Start small. Do not try to make dramatic changes to your diet all at once. You might feel that you are missing out on your favorite foods and then be more likely to not follow the plan. Make small changes, and stick with them. Once those changes become habit, add a few more changes. · Try some of the following: ? Make it a goal to eat a fruit or vegetable at every meal and at snacks. This will make it easy to get the recommended amount of fruits and vegetables each day. ? Try yogurt topped with fruit and nuts for a snack or healthy dessert. ? Add lettuce, tomato, cucumber, and onion to sandwiches. ? Combine a ready-made pizza crust with low-fat mozzarella cheese and lots of vegetable toppings. Try using tomatoes, squash, spinach, broccoli, carrots, cauliflower, and onions. ? Have a variety of cut-up vegetables with a low-fat dip as an appetizer instead of chips and dip. ? Sprinkle sunflower seeds or chopped almonds over salads.  Or try adding chopped walnuts or almonds to cooked vegetables. ? Try some vegetarian meals using beans and peas. Add garbanzo or kidney beans to salads. Make burritos and tacos with mashed bradley beans or black beans. Where can you learn more? Go to http://jayne-neelima.info/. Enter C158 in the search box to learn more about \"DASH Diet: Care Instructions. \" Current as of: July 22, 2018 Content Version: 12.1 © 1916-2070 [employer]. Care instructions adapted under license by [employer] (which disclaims liability or warranty for this information). If you have questions about a medical condition or this instruction, always ask your healthcare professional. [address] any warranty or liability for your use of this information.

## 2019-08-23 ENCOUNTER — HOSPITAL ENCOUNTER (OUTPATIENT)
Dept: PHYSICAL THERAPY | Age: 79
Discharge: HOME | End: 2019-08-23
Payer: MEDICARE

## 2019-08-23 PROCEDURE — 97112 NEUROMUSCULAR REEDUCATION: CPT | Performed by: PHYSICAL THERAPIST

## 2019-08-23 PROCEDURE — 97161 PT EVAL LOW COMPLEX 20 MIN: CPT | Performed by: PHYSICAL THERAPIST
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
## 2019-08-23 NOTE — PROGRESS NOTES
[address] [facility] Zhanna Rivas  Phone: [phone]  Fax: [phone]    Plan of Care/Statement of Necessity for Physical Therapy Services  2-15    Patient name: Altagracia Brar  : 1940  Provider#: [id number]  Referral source: Sharri Davila *      Medical/Treatment Diagnosis: Low back pain [M54.5]     Prior Hospitalization: see medical history     Comorbidities: see in Connect [facility]  Prior Level of Function: gardening, walking, crocheting, writing, reading  Medications: Verified on Patient Summary List  Start of Care: 2019      Onset Date: ongoing x several years   The Plan of Care and following information is based on the information from the initial evaluation. Assessment/ key information: Ayad Linares presents to our office with limited thoracic mobility, cervical mobility, postural dysfunction, limited functional mobility and standing tolerance and a need for intervention to improve ADL/IADL tolerance. She will benefit from skilled RUBA intervention prior to D/C to allow for independent and improved ADL and IADL skills as well as to allow standing without pain.      Evaluation Complexity History HIGH Complexity :3+ comorbidities / personal factors will impact the outcome/ POC ; Examination HIGH Complexity : 4+ Standardized tests and measures addressing body structure, function, activity limitation and / or participation in recreation  ;Presentation LOW Complexity : Stable, uncomplicated  ;Clinical Decision Making MEDIUM Complexity : FOTO score of 26-74  Overall Complexity Rating: LOW     Problem List: pain affecting function, decrease ROM, decrease strength, impaired gait/ balance, decrease ADL/ functional abilitiies, decrease activity tolerance, decrease flexibility/ joint mobility and decrease transfer abilities   Treatment Plan may include any combination of the following: Therapeutic exercise, Therapeutic activities, Neuromuscular re-education, Physical agent/modality, Gait/balance training, Manual therapy, Patient education, Self Care training, Functional mobility training, Home safety training and Stair training  Patient / Family readiness to learn indicated by: asking questions, trying to perform skills and interest  Persons(s) to be included in education: patient (P) and family support person (FSP);list   Barriers to Learning/Limitations: None  Patient Goal (s): to not have pain when i stand  Patient Self Reported Health Status: excellent  Rehabilitation Potential: good    Short Term Goals: To be accomplished in 4 treatments:  1) Patient will demonstrate Negative Hruska Adduction Drop Test   2) Patient will demonstrate independence with HEP  3) Patient will demonstrate greater than Grade II on Hruska Adduction Lift Test    Long Term Goals: To be accomplished in 12 treatments:  1)Patient will demonstrate Grade IV or Grade V Hruska Adduction Lift Score to allow for functional mobility in home and community settings  2)Pt will demonstrate the ability to ambulate forward and backward achieving bilateral Acetabular Femoral Internal Rotation for pain free mobility  3)Pt will demonstrate the ability to walk and stand without subjective complaints or gait deviation  4)Pt will demonstrate independence with discharge HEP to allow for ambulation, sitting and standing without objective dysfunction    Frequency / Duration: Patient to be seen 1-2 times per week for up to 12 treatments. Patient/ Caregiver education and instruction: self care, activity modification and exercises    [x]  Plan of care has been reviewed with PTA        Certification Period: 90 days    Maryam Dunaway PT, DPT, [facility]    8/23/2019     ________________________________________________________________________    I certify that the above Therapy Services are being furnished while the patient is under my care.  I agree with the treatment plan and certify that this therapy is necessary.     [facility] Signature:____________________  Date:____________Time: _________

## 2019-08-28 ENCOUNTER — HOSPITAL ENCOUNTER (OUTPATIENT)
Dept: PHYSICAL THERAPY | Age: 79
Discharge: HOME | End: 2019-08-28
Payer: MEDICARE

## 2019-08-28 PROCEDURE — 97112 NEUROMUSCULAR REEDUCATION: CPT | Performed by: PHYSICAL THERAPIST

## 2019-08-28 NOTE — PROGRESS NOTES
PT DAILY TREATMENT NOTE - [facility] 2-15    Patient Name: Patricia Wilson  Date:2019  : 1940  [x]  Patient  Verified  Payor: Brittany Martinez / Plan: [facility] MEDICARE CHOICE PPO/PFFS / Product Type: Managed Care Medicare /    In time:9:30 am   Out time:10:20 am   Total Treatment Time (min): 50  Total Timed Codes (min): 45  1:1 Treatment Time ( [address] only): 45   Visit #:  2    Treatment Area: Low back pain [M54.5]    SUBJECTIVE  Pain Level (0-10 scale): 0/10 (yesterday had some pn following working in yard)  Any medication changes, allergies to medications, adverse drug reactions, diagnosis change, or new procedure performed?: [x] No    [] Yes (see summary sheet for update)  Subjective functional status/changes:   [] No changes reported  Pt reports aching in middle of back following working in the yard and had to put some heat on her mid back to relieve the pain. She reports that the heat reduced her pain. Her pain in her upper back got to a 6/10 following the yard work. Did not bother her during the yard work. She was pulling weeds and spreading mulch while carrying a bucket.      OBJECTIVE    Modality rationale: decrease pain and increase tissue extensibility to improve the patients ability to return to N ADL skills without pain   Min Type Additional Details       [] Estim: []Att   []Unatt    []TENS instruct                  []IFC  []Premod   []NMES                     []Other:  []w/US   []w/ice   []w/heat  Position:  Location:       []  Traction: [] Cervical       []Lumbar                       [] Prone          []Supine                       []Intermittent   []Continuous Lbs:  [] before manual  [] after manual  []w/heat    []  Ultrasound: []Continuous   [] Pulsed                       at: []1MHz   []3MHz Location:  W/cm2:    [] Paraffin         Location:   []w/heat   15 []  Ice     [x]  Heat  []  Ice massage Position: hooklying  Location: anterior chest during therapeutic exercise    []  Laser  []  Other: Position:  Location:      []  Vasopneumatic Device Pressure:       [] lo [] med [] hi   Temperature:      [x] Skin assessment post-treatment:  [x]intact [x]redness- no adverse reaction    []redness - adverse reaction:        45 min Neuromuscular Re-education:  [x]  See flow sheet :   Rationale: increase ROM, increase strength, improve coordination, improve balance and increase proprioception  to improve the patients ability to return to N ADL skills            With   [] TE   [] TA   [x] neuro   [] other: Patient Education: [x] Review HEP    [] Progressed/Changed HEP based on:   [] positioning   [] body mechanics   [] transfers   [] heat/ice application    [] other:      Other Objective/Functional Measures: HGIR - bilaterally  Bilateral HaDDT - bilaterally  Apical expansion limited more on L than R  Horizontal abd equal bilaterally     Pain Level (0-10 scale) post treatment: 0/10    ASSESSMENT/Changes in Function:   Tomas Simeon demonstrated excellent compliance with recommendations given at first visit. She has demonstrated objective improvements as measured today and is progressing toward STG and LTG. Patient will continue to benefit from skilled PT services to modify and progress therapeutic interventions, address functional mobility deficits, address ROM deficits, address strength deficits, analyze and address soft tissue restrictions, analyze and cue movement patterns, analyze and modify body mechanics/ergonomics, assess and modify postural abnormalities and instruct in home and community integration to attain remaining goals. [x]  See Plan of Care  []  See progress note/recertification  []  See Discharge Summary         Progress towards goals / Updated goals:  Compliant with HEP since initial visit.     PLAN  []  Upgrade activities as tolerated     [x]  Continue plan of care  [x]  Update interventions per flow sheet       []  Discharge due to:_  []  Other:_      Chelita Calvin, PT, DPT, [facility] 8/28/2019

## 2019-09-04 ENCOUNTER — HOSPITAL ENCOUNTER (OUTPATIENT)
Dept: PHYSICAL THERAPY | Age: 79
Discharge: HOME | End: 2019-09-04
Payer: MEDICARE

## 2019-09-04 PROCEDURE — 97110 THERAPEUTIC EXERCISES: CPT | Performed by: PHYSICAL THERAPIST

## 2019-09-04 NOTE — PROGRESS NOTES
PT DAILY TREATMENT NOTE - [facility] 2-15    Patient Name: Mary Antonio  Date:2019  : 1940  [x]  Patient  Verified  Payor: Chelsea Navas / Plan: [facility] [employer] MEDICARE CHOICE PPO/PFFS / Product Type: Managed Care Medicare /    In time: 1:30 pm   Out time: 2:20 pm   Total Treatment Time (min): 50  Total Timed Codes (min): 45  1:1 Treatment Time ( [address] only): 39   Visit #:  3    Treatment Area: Low back pain [M54.5]    SUBJECTIVE  Pain Level (0-10 scale): 0/10 currently; 5/10 at worst  Any medication changes, allergies to medications, adverse drug reactions, diagnosis change, or new procedure performed?: [x] No    [] Yes (see summary sheet for update)  Subjective functional status/changes:   [] No changes reported  Pt reports that the middle of her back has been more uncomfortable the last few days, especially yesterday. She doesn't know why. She reports her exercises don't seem painful, however they are maybe giving her discomfort per her report. OBJECTIVE       50 min Therapeutic exercise:  [x]  See flow sheet :   Rationale: increase ROM, increase strength, improve coordination, improve balance and increase proprioception  to improve the patients ability to return to N ADL skills            With   [] TE   [] TA   [x] neuro   [] other: Patient Education: [x] Review HEP    [] Progressed/Changed HEP based on:   [] positioning   [] body mechanics   [] transfers   [] heat/ice application    [] other:      Other Objective/Functional Measures:   No RUBA testing today     Pain Level (0-10 scale) post treatment: 0/10    ASSESSMENT/Changes in Function:   Demarcus Mg demonstrated excellent compliance with recommendations given at first visit. She has demonstrated objective improvements as measured today and is progressing toward STG and LTG.   Patient will continue to benefit from skilled PT services to modify and progress therapeutic interventions, address functional mobility deficits, address ROM deficits, address strength deficits, analyze and address soft tissue restrictions, analyze and cue movement patterns, analyze and modify body mechanics/ergonomics, assess and modify postural abnormalities and instruct in home and community integration to attain remaining goals. [x]  See Plan of Care  []  See progress note/recertification  []  See Discharge Summary         Progress towards goals / Updated goals:  Compliant with HEP since initial visit.     PLAN  []  Upgrade activities as tolerated     [x]  Continue plan of care  [x]  Update interventions per flow sheet       []  Discharge due to:_  []  Other:_      Jocelyn Or, PT, DPT, [facility] 9/4/2019

## 2019-09-04 NOTE — PROGRESS NOTES
[address] [place] [address] Luis M Rivas  Phone: [phone]  Fax: [phone]    Plan of Care/Statement of Necessity for Physical Therapy Services  (Adjusted 2019)      Patient name: Holly Interiano  : 1940  Provider#: [id number]  Referral source: Alyse Medal *      Medical/Treatment Diagnosis: Low back pain [M54.5]     Prior Hospitalization: see medical history     Comorbidities: see in [facility]  Prior Level of Function: gardening, walking, crocheting, writing, reading  Medications: Verified on Patient Summary List  Start of Care: 2019      Onset Date: ongoing x several years   The Plan of Care and following information is based on the information from the initial evaluation. Assessment/ key information: Britt Speaker presents to our office with limited thoracic mobility, cervical mobility, postural dysfunction, limited functional mobility and standing tolerance and a need for intervention to improve ADL/IADL tolerance. She will benefit from skilled RUBA intervention prior to D/C to allow for independent and improved ADL and IADL skills as well as to allow standing without pain. Braydon Price may benefit from the addition of dry needling for pn relief and improvment of function at thoracic spine T5/6 where vertebral body is wedged per imaging report. **    Evaluation Complexity History HIGH Complexity :3+ comorbidities / personal factors will impact the outcome/ POC ; Examination HIGH Complexity : 4+ Standardized tests and measures addressing body structure, function, activity limitation and / or participation in recreation  ;Presentation LOW Complexity : Stable, uncomplicated  ;Clinical Decision Making MEDIUM Complexity : FOTO score of 26-74  Overall Complexity Rating: LOW     Problem List: pain affecting function, decrease ROM, decrease strength, impaired gait/ balance, decrease ADL/ functional abilitiies, decrease activity tolerance, decrease flexibility/ joint mobility and decrease transfer abilities   Treatment Plan may include any combination of the following: Therapeutic exercise, Therapeutic activities, Neuromuscular re-education, Physical agent/modality, Gait/balance training, Manual therapy, Patient education, Self Care training, Functional mobility training, Home safety training and Stair training  Patient / Family readiness to learn indicated by: asking questions, trying to perform skills and interest  Persons(s) to be included in education: patient (P) and family support person (FSP);list   Barriers to Learning/Limitations: None  Patient Goal (s): to not have pain when i stand  Patient Self Reported Health Status: excellent  Rehabilitation Potential: good    Short Term Goals: To be accomplished in 4 treatments:  1) Patient will demonstrate Negative Hruska Adduction Drop Test   2) Patient will demonstrate independence with HEP  3) Patient will demonstrate greater than Grade II on Hruska Adduction Lift Test    Long Term Goals: To be accomplished in 12 treatments:  1)Patient will demonstrate Grade IV or Grade V Hruska Adduction Lift Score to allow for functional mobility in home and community settings  2)Pt will demonstrate the ability to ambulate forward and backward achieving bilateral Acetabular Femoral Internal Rotation for pain free mobility  3)Pt will demonstrate the ability to walk and stand without subjective complaints or gait deviation  4)Pt will demonstrate independence with discharge HEP to allow for ambulation, sitting and standing without objective dysfunction    Frequency / Duration: Patient to be seen 1-2 times per week for up to 12 treatments. With the addition of  Dry Needling.        Patient/ Caregiver education and instruction: self care, activity modification and exercises    [x]  Plan of care has been reviewed with PTA        Certification Period: 90 days    Asher Zhao PT, DPT, [facility]    9/4/2019 ________________________________________________________________________    I certify that the above Therapy Services are being furnished while the patient is under my care. I agree with the treatment plan and certify that this therapy is necessary.     [de-identified] Signature:____________________  Date:____________Time: _________

## 2019-09-06 ENCOUNTER — HOSPITAL ENCOUNTER (OUTPATIENT)
Dept: PHYSICAL THERAPY | Age: 79
Discharge: HOME | End: 2019-09-06
Payer: MEDICARE

## 2019-09-06 PROCEDURE — 97140 MANUAL THERAPY 1/> REGIONS: CPT | Performed by: PHYSICAL THERAPIST

## 2019-09-06 PROCEDURE — 97110 THERAPEUTIC EXERCISES: CPT | Performed by: PHYSICAL THERAPIST

## 2019-09-06 NOTE — PROGRESS NOTES
PT DAILY TREATMENT NOTE - [facility] 2-15    Patient Name: Juan Jose Davis  Date:2019  : 1940  [x]  Patient  Verified  Payor: Barb George / Plan: [facility] [employer] MEDICARE CHOICE PPO/PFFS / Product Type: Managed Care Medicare /    In time:9:30 AM  Out time:10:30 AM  Total Treatment Time (min): 60  Total Timed Codes (min): 60  1:1 Treatment Time ([address] only): 60   Visit #:  4    Treatment Area: Low back pain [M54.5]    SUBJECTIVE  Pain Level (0-10 scale): 0/10  Any medication changes, allergies to medications, adverse drug reactions, diagnosis change, or new procedure performed?: [x] No    [] Yes (see summary sheet for update)  Subjective functional status/changes:   [] No changes reported  Patient reports similar symptoms. No pain at rest, but continues to have mid-back pain when standing at the counter for several minutes. She wishes to trial DN to the area to see if she can have more lasting pain relief.     OBJECTIVE    Modality rationale: Patient declined   Min Type Additional Details       [] Estim: []Att   []Unatt    []TENS instruct                  []IFC  []Premod   []NMES                     []Other:  []w/US   []w/ice   []w/heat  Position:  Location:       []  Traction: [] Cervical       []Lumbar                       [] Prone          []Supine                       []Intermittent   []Continuous Lbs:  [] before manual  [] after manual  []w/heat    []  Ultrasound: []Continuous   [] Pulsed                       at: []1MHz   []3MHz Location:  W/cm2:    [] Paraffin         Location:   []w/heat    []  Ice     []  Heat  []  Ice massage Position:  Location:    []  Laser  []  Other: Position:  Location:      []  Vasopneumatic Device Pressure:       [] lo [] med [] hi   Temperature:      [x] Skin assessment post-treatment:  [x]intact []redness- no adverse reaction    []redness - adverse reaction:     45 min Therapeutic Exercise:  [x] See flow sheet :   Rationale: increase ROM, increase strength and improve coordination to improve the patients ability to stand for prolonged periods without pain    15 min Manual Therapy:   DN was performed in prone to the following thoracic spine musculature: Multifidi at T6-T10. 30 mm x .30 mm needles were used without manipulation techniques. Bony contact was made onto the lamina at each segment to ensure optimal safety measures and full depth of musculature reached. Needles were left in situ for 5 minutes and then removed with no signs of adverse events. DN was performed prior to the following manual therapy techniques to allow for improved tolerance to standing:  STM along the thoracic paraspinals in prone. Rationale: decrease pain, increase ROM, increase tissue extensibility, decrease trigger points and increase postural awareness to improve the patients ability to stand for prolonged periods without pain    With   [] TE   [] TA   [] neuro   [] other: Patient Education: [x] Review HEP    [] Progressed/Changed HEP based on:   [] positioning   [] body mechanics   [] transfers   [] heat/ice application    [] other:      Other Objective/Functional Measures:      Pain Level (0-10 scale) post treatment: 0    ASSESSMENT/Changes in Function:   Patient has not shown significant changes in function since her last visit. Patient will continue to benefit from skilled PT services to modify and progress therapeutic interventions, address functional mobility deficits, address ROM deficits, address strength deficits, analyze and address soft tissue restrictions, analyze and cue movement patterns, analyze and modify body mechanics/ergonomics and assess and modify postural abnormalities to attain remaining goals. []  See Plan of Care  []  See progress note/recertification  []  See Discharge Summary         Progress towards goals / Updated goals:  Patient tolerated today's introduction of DN very well and will look to progress core stabilization exercises next visit.     PLAN  [x] Upgrade activities as tolerated     [x]  Continue plan of care  [x]  Update interventions per flow sheet       []  Discharge due to:_  []  Other:_      Juancarlos Pierre, PT , DPT, OCS, Cert.  DN   9/6/2019

## 2019-09-09 ENCOUNTER — HOSPITAL ENCOUNTER (OUTPATIENT)
Dept: PHYSICAL THERAPY | Age: 79
Discharge: HOME | End: 2019-09-09
Payer: MEDICARE

## 2019-09-09 PROCEDURE — 97014 ELECTRIC STIMULATION THERAPY: CPT | Performed by: PHYSICAL THERAPIST

## 2019-09-09 PROCEDURE — 97110 THERAPEUTIC EXERCISES: CPT | Performed by: PHYSICAL THERAPIST

## 2019-09-09 PROCEDURE — 97140 MANUAL THERAPY 1/> REGIONS: CPT | Performed by: PHYSICAL THERAPIST

## 2019-09-09 NOTE — PROGRESS NOTES
PT DAILY TREATMENT NOTE - [facility] 2-15    Patient Name: Lois Whitfield  Date:2019  : 1940  [x]  Patient  Verified  Payor: Jackeline Agent / Plan: [facility] [employer] MEDICARE CHOICE PPO/PFFS / Product Type: Managed Care Medicare /    In time:1:50 PM  Out time:2:45 PM  Total Treatment Time (min): 55  Total Timed Codes (min): 45  1:1 Treatment Time ([facility] only): 45   Visit #:  5    Treatment Area: Low back pain [M54.5]    SUBJECTIVE  Pain Level (0-10 scale): 0/10  Any medication changes, allergies to medications, adverse drug reactions, diagnosis change, or new procedure performed?: [x] No    [] Yes (see summary sheet for update)  Subjective functional status/changes:   [] No changes reported  No significant change in pain since her last visit.       OBJECTIVE    Modality rationale: Patient declined   Min Type Additional Details      10 [x] Estim: []Att   []Unatt    []TENS instruct                  []IFC  []Premod   []NMES                     [x]Other: 300 usec, 2 Hz, asymmetrical biphasic  []w/US   []w/ice   []w/heat  Position:prone  Location:paraspinals in conjunction with e-stim       []  Traction: [] Cervical       []Lumbar                       [] Prone          []Supine                       []Intermittent   []Continuous Lbs:  [] before manual  [] after manual  []w/heat    []  Ultrasound: []Continuous   [] Pulsed                       at: []1MHz   []3MHz Location:  W/cm2:    [] Paraffin         Location:   []w/heat    []  Ice     []  Heat  []  Ice massage Position:  Location:    []  Laser  []  Other: Position:  Location:      []  Vasopneumatic Device Pressure:       [] lo [] med [] hi   Temperature:      [x] Skin assessment post-treatment:  [x]intact []redness- no adverse reaction    []redness - adverse reaction:     20 min Therapeutic Exercise:  [x] See flow sheet :   Rationale: increase ROM, increase strength and improve coordination to improve the patients ability to stand for prolonged periods without pain    25 min Manual Therapy:   DN was performed in prone to the following thoracic spine musculature: Multifidi at T6-T10. 30 mm x .30 mm needles were used without manipulation techniques. Bony contact was made onto the lamina at each segment to ensure optimal safety measures and full depth of musculature reached. Needles were left in situ for 5 minutes and then removed with no signs of adverse events. DN was performed prior to the following manual therapy techniques to allow for improved tolerance to standing:  STM along the thoracic paraspinals in prone. Rationale: decrease pain, increase ROM, increase tissue extensibility, decrease trigger points and increase postural awareness to improve the patients ability to stand for prolonged periods without pain    With   [] TE   [] TA   [] neuro   [] other: Patient Education: [x] Review HEP    [] Progressed/Changed HEP based on:   [] positioning   [] body mechanics   [] transfers   [] heat/ice application    [] other:      Other Objective/Functional Measures:      Pain Level (0-10 scale) post treatment: 0    ASSESSMENT/Changes in Function:   Patient has not shown significant changes in function since her last visit. Patient will continue to benefit from skilled PT services to modify and progress therapeutic interventions, address functional mobility deficits, address ROM deficits, address strength deficits, analyze and address soft tissue restrictions, analyze and cue movement patterns, analyze and modify body mechanics/ergonomics and assess and modify postural abnormalities to attain remaining goals. []  See Plan of Care  []  See progress note/recertification  []  See Discharge Summary         Progress towards goals / Updated goals:  No significant change in functional status since initiation of DN, but she tolerated the introduction of e-stim well.     PLAN  [x]  Upgrade activities as tolerated     [x]  Continue plan of care  [x]  Update interventions per flow sheet       []  Discharge due to:_  []  Other:_      Ranjith Parson, PT , DPT, OCS, Cert.  DN   9/9/2019

## 2019-09-11 ENCOUNTER — APPOINTMENT (OUTPATIENT)
Dept: PHYSICAL THERAPY | Age: 79
End: 2019-09-11
Payer: MEDICARE

## 2019-09-11 ENCOUNTER — HOSPITAL ENCOUNTER (OUTPATIENT)
Dept: PHYSICAL THERAPY | Age: 79
Discharge: HOME | End: 2019-09-11
Payer: MEDICARE

## 2019-09-11 PROCEDURE — 97014 ELECTRIC STIMULATION THERAPY: CPT | Performed by: PHYSICAL THERAPIST

## 2019-09-11 PROCEDURE — 97140 MANUAL THERAPY 1/> REGIONS: CPT | Performed by: PHYSICAL THERAPIST

## 2019-09-11 PROCEDURE — 97110 THERAPEUTIC EXERCISES: CPT | Performed by: PHYSICAL THERAPIST

## 2019-09-11 NOTE — PROGRESS NOTES
PT DAILY TREATMENT NOTE - [facility] 2-15    Patient Name: Mary Antonio  Date:2019  : 1940  [x]  Patient  Verified  Payor: Chelsea Navas / Plan: [facility] [employer] MEDICARE CHOICE PPO/PFFS / Product Type: Managed Care Medicare /    In time:11:00 AM  Out time:11:55 AM  Total Treatment Time (min): 55  Total Timed Codes (min): 45  1:1 Treatment Time ( [address] only): 45   Visit #:  6    Treatment Area: Low back pain [M54.5]    SUBJECTIVE  Pain Level (0-10 scale): 0/10  Any medication changes, allergies to medications, adverse drug reactions, diagnosis change, or new procedure performed?: [x] No    [] Yes (see summary sheet for update)  Subjective functional status/changes:   [] No changes reported  Patient reports a significant difference in her back pain since the last visit and is very happy with the outcomes.       OBJECTIVE    Modality rationale: Patient declined   Min Type Additional Details      10 [x] Estim: []Att   []Unatt    []TENS instruct                  []IFC  []Premod   []NMES                     [x]Other: 300 usec, 2 Hz, asymmetrical biphasic  []w/US   []w/ice   []w/heat  Position:prone  Location:paraspinals in conjunction with e-stim       []  Traction: [] Cervical       []Lumbar                       [] Prone          []Supine                       []Intermittent   []Continuous Lbs:  [] before manual  [] after manual  []w/heat    []  Ultrasound: []Continuous   [] Pulsed                       at: []1MHz   []3MHz Location:  W/cm2:    [] Paraffin         Location:   []w/heat    []  Ice     []  Heat  []  Ice massage Position:  Location:    []  Laser  []  Other: Position:  Location:      []  Vasopneumatic Device Pressure:       [] lo [] med [] hi   Temperature:      [x] Skin assessment post-treatment:  [x]intact []redness- no adverse reaction    []redness - adverse reaction:     20 min Therapeutic Exercise:  [x] See flow sheet :   Rationale: increase ROM, increase strength and improve coordination to improve the patients ability to stand for prolonged periods without pain    25 min Manual Therapy:   DN was performed in prone to the following thoracic spine musculature: Multifidi at T6-T10. 30 mm x .30 mm needles were used without manipulation techniques. Bony contact was made onto the lamina at each segment to ensure optimal safety measures and full depth of musculature reached. Needles were left in situ for 5 minutes and then removed with no signs of adverse events. DN was performed prior to the following manual therapy techniques to allow for improved tolerance to standing:  STM along the thoracic paraspinals in prone. Rationale: decrease pain, increase ROM, increase tissue extensibility, decrease trigger points and increase postural awareness to improve the patients ability to stand for prolonged periods without pain    With   [] TE   [] TA   [] neuro   [] other: Patient Education: [x] Review HEP    [] Progressed/Changed HEP based on:   [] positioning   [] body mechanics   [] transfers   [] heat/ice application    [] other:      Other Objective/Functional Measures:      Pain Level (0-10 scale) post treatment: 0    ASSESSMENT/Changes in Function:   Patient is now demonstrating significant improvements in standing tolerance. Patient will continue to benefit from skilled PT services to modify and progress therapeutic interventions, address functional mobility deficits, address ROM deficits, address strength deficits, analyze and address soft tissue restrictions, analyze and cue movement patterns, analyze and modify body mechanics/ergonomics and assess and modify postural abnormalities to attain remaining goals. []  See Plan of Care  []  See progress note/recertification  []  See Discharge Summary         Progress towards goals / Updated goals:  Patient is tolerating all interventions well and is making good overall progress towards long term goals.     PLAN  [x]  Upgrade activities as tolerated     [x]  Continue plan of care  [x]  Update interventions per flow sheet       []  Discharge due to:_  []  Other:_      Radha Abel PT , DPT, OCS, Cert.  DN   9/11/2019

## 2019-09-18 ENCOUNTER — HOSPITAL ENCOUNTER (OUTPATIENT)
Dept: PHYSICAL THERAPY | Age: 79
Discharge: HOME | End: 2019-09-18
Payer: MEDICARE

## 2019-09-18 ENCOUNTER — APPOINTMENT (OUTPATIENT)
Dept: PHYSICAL THERAPY | Age: 79
End: 2019-09-18
Payer: MEDICARE

## 2019-09-18 PROCEDURE — 97014 ELECTRIC STIMULATION THERAPY: CPT | Performed by: PHYSICAL THERAPIST

## 2019-09-18 PROCEDURE — 97140 MANUAL THERAPY 1/> REGIONS: CPT | Performed by: PHYSICAL THERAPIST

## 2019-09-18 PROCEDURE — 97110 THERAPEUTIC EXERCISES: CPT | Performed by: PHYSICAL THERAPIST

## 2019-09-18 NOTE — PROGRESS NOTES
PT DAILY TREATMENT NOTE - [facility] 2-15    Patient Name: Rina Bender  Date:2019  : 1940  [x]  Patient  Verified  Payor: Kaci Virgen / Plan: [facility] [employer] MEDICARE CHOICE PPO/PFFS / Product Type: Managed Care Medicare /    In time:2:00 PM Out time:2:55 PM  Total Treatment Time (min): 55  Total Timed Codes (min): 45  1:1 Treatment Time ( [address] only): 39   Visit #:  7    Treatment Area: Low back pain [M54.5]    SUBJECTIVE  Pain Level (0-10 scale): 0/10  Any medication changes, allergies to medications, adverse drug reactions, diagnosis change, or new procedure performed?: [x] No    [] Yes (see summary sheet for update)  Subjective functional status/changes:   [] No changes reported  Patient reports continued pain relief and is very happy with her progress.       OBJECTIVE    Modality rationale: Patient declined   Min Type Additional Details      10 [x] Estim: []Att   []Unatt    []TENS instruct                  []IFC  []Premod   []NMES                     [x]Other: 300 usec, 2 Hz, asymmetrical biphasic  []w/US   []w/ice   []w/heat  Position:prone  Location:paraspinals in conjunction with e-stim       []  Traction: [] Cervical       []Lumbar                       [] Prone          []Supine                       []Intermittent   []Continuous Lbs:  [] before manual  [] after manual  []w/heat    []  Ultrasound: []Continuous   [] Pulsed                       at: []1MHz   []3MHz Location:  W/cm2:    [] Paraffin         Location:   []w/heat    []  Ice     []  Heat  []  Ice massage Position:  Location:    []  Laser  []  Other: Position:  Location:      []  Vasopneumatic Device Pressure:       [] lo [] med [] hi   Temperature:      [x] Skin assessment post-treatment:  [x]intact []redness- no adverse reaction    []redness - adverse reaction:     20 min Therapeutic Exercise:  [x] See flow sheet :   Rationale: increase ROM, increase strength and improve coordination to improve the patients ability to stand for prolonged periods without pain    25 min Manual Therapy:   DN was performed in prone to the following thoracic spine musculature: Multifidi at T6-T10. 30 mm x .30 mm needles were used without manipulation techniques. Bony contact was made onto the lamina at each segment to ensure optimal safety measures and full depth of musculature reached. Needles were left in situ for 5 minutes and then removed with no signs of adverse events. DN was performed prior to the following manual therapy techniques to allow for improved tolerance to standing:  STM along the thoracic paraspinals in prone. Rationale: decrease pain, increase ROM, increase tissue extensibility, decrease trigger points and increase postural awareness to improve the patients ability to stand for prolonged periods without pain    With   [] TE   [] TA   [] neuro   [] other: Patient Education: [x] Review HEP    [] Progressed/Changed HEP based on:   [] positioning   [] body mechanics   [] transfers   [] heat/ice application    [] other:      Other Objective/Functional Measures:      Pain Level (0-10 scale) post treatment: 0    ASSESSMENT/Changes in Function:   Patient is now demonstrating significant improvements in standing tolerance. Patient will continue to benefit from skilled PT services to modify and progress therapeutic interventions, address functional mobility deficits, address ROM deficits, address strength deficits, analyze and address soft tissue restrictions, analyze and cue movement patterns, analyze and modify body mechanics/ergonomics and assess and modify postural abnormalities to attain remaining goals. []  See Plan of Care  []  See progress note/recertification  [x]  See Discharge Summary           PLAN  [x]  Upgrade activities as tolerated     [x]  Continue plan of care  [x]  Update interventions per flow sheet       []  Discharge due to:_  []  Other:_      Betzaida Mir, PT , DPT, OCS, Cert.  DN   9/18/2019

## 2019-09-25 ENCOUNTER — APPOINTMENT (OUTPATIENT)
Dept: PHYSICAL THERAPY | Age: 79
End: 2019-09-25
Payer: MEDICARE

## 2019-10-28 NOTE — PROGRESS NOTES
[facility]   [address] [facility] [place] [facility]  Phone: [phone] Fax: [phone]      Discharge Summary 2-15    Patient name: Martha Finney  : 1940  Provider#: [id number]  Referral source: Kale Brown *      Medical/Treatment Diagnosis: Low back pain [M54.5]     Prior Hospitalization: see medical history     Comorbidities: See Plan of Care  Prior Level of Function: See Plan of Care  Medications: Verified on Patient Summary List    Start of Care: 19      Onset Date:ongoing x several years      Visits from Start of Care: 7     Missed Visits: 0  Reporting Period : 19 to 19    Assessment/Summary of care: Mrs. Sophy Zazueta did very well with PT with a focus on manual therapy, dry needling, and a gradual progression of therapeutic exercises. She achieved all long term goals and has not needed to return to PT over the past 6 weeks. Short Term Goals: To be accomplished in 4 treatments:  1) Patient will demonstrate Negative Hruska Adduction Drop Test Met.  2) Patient will demonstrate independence with HEPMet. 3) Patient will demonstrate greater than Grade II on Hruska Adduction Lift TestMet.     Long Term Goals: To be accomplished in 12 treatments:  1)Patient will demonstrate Grade IV or Grade V Hruska Adduction Lift Score to allow for functional mobility in home and community settings  Met. 2)Pt will demonstrate the ability to ambulate forward and backward achieving bilateral Acetabular Femoral Internal Rotation for pain free mobility Met. 3)Pt will demonstrate the ability to walk and stand without subjective complaints or gait deviation Met. 4)Pt will demonstrate independence with discharge HEP to allow for ambulation, sitting and standing without objective dysfunction  Met.         RECOMMENDATIONS:  [x]Discontinue therapy: [x]Patient has reached or is progressing toward set goals     []Patient is non-compliant or has abdicated     []Due to lack of appreciable progress towards set goals     []Other  Marlene Peters, PT 10/28/2019

## 2019-10-31 ENCOUNTER — TELEPHONE (OUTPATIENT)
Dept: INTERNAL MEDICINE CLINIC | Age: 79
End: 2019-10-31

## 2019-10-31 NOTE — TELEPHONE ENCOUNTER
returned call to pt. She states she is still having discomfort and difficulty sleeping after her rotator cuff repair.  I advised her that she needs to reach out to her surgeon for further help and medications

## 2019-10-31 NOTE — TELEPHONE ENCOUNTER
Patient called to report that she had rotator cuff surgery on her right shoulder 4 weeks ago. Patient reports being given hydrocodone which did not help her pain. Patient was also given tramadol for pain by specialist. Patient is requesting to know what DR. Gianna Toledo recommend she take that is not an opiate. Patient reports that she has been taking deejay back in body which has helped her get through the day some. Patient stated that she is unable to get comfortable due to pain at night & is unable to sleep due to this. Please call patient to advise.  [phone]

## 2020-04-28 ENCOUNTER — OFFICE VISIT (OUTPATIENT)
Dept: OBGYN CLINIC | Age: 80
End: 2020-04-28

## 2020-04-28 VITALS
DIASTOLIC BLOOD PRESSURE: 89 MMHG | BODY MASS INDEX: 24.66 KG/M2 | WEIGHT: 148 LBS | SYSTOLIC BLOOD PRESSURE: 165 MMHG | HEIGHT: 65 IN

## 2020-04-28 DIAGNOSIS — N89.8 VAGINAL LESION: Primary | ICD-10-CM

## 2020-04-28 RX ORDER — ESTRADIOL 0.1 MG/G
1 CREAM VAGINAL
Qty: 1 TUBE | Refills: 11 | Status: SHIPPED | OUTPATIENT
Start: 2020-04-30 | End: 2021-04-15

## 2020-04-28 NOTE — PROGRESS NOTES
Vulvar lesion evaluation    Radha Torres is a 70 y.o. female  No LMP recorded. Patient is postmenopausal.who presents with a lesion on her vaginal area. She noticed it a few months ago. The lesion is painful at times but not always. She reports the following associated symptoms: pain. She denies the following associated symptoms:fever  Aggravating factors: intercourse. Past Medical History:   Diagnosis Date    Fracture of left foot 06/2018    Glaucoma     Dr. Yee Edwards    Pap smear for cervical cancer screening     2009 neg 2010 neg 2012 neg    Right shoulder pain      Past Surgical History:   Procedure Laterality Date    HX BREAST BIOPSY Left yrs ago    neg    HX TONSILLECTOMY       Social History     Occupational History    Not on file   Tobacco Use    Smoking status: Never Smoker    Smokeless tobacco: Never Used   Substance and Sexual Activity    Alcohol use: Yes     Alcohol/week: 1.0 standard drinks     Types: 1 Glasses of wine per week     Frequency: 2-3 times a week     Comment: rarely    Drug use: No    Sexual activity: Yes     Partners: Male     Birth control/protection: None     Family History   Problem Relation Age of Onset    No Known Problems Mother        No Known Allergies  Prior to Admission medications    Medication Sig Start Date End Date Taking? Authorizing Provider   latanoprost (XALATAN) 0.005 % ophthalmic solution Administer 1 Drop to both eyes daily. 4/25/17  Yes Provider, Historical   metroNIDAZOLE (METROCREAM) 0.75 % topical cream Apply  to affected area two (2) times a day. Use a thin layer to affected areas after washing    Provider, Historical   calcium-cholecalciferol, D3, (CALTRATE 600+D) tablet Take 1 Tab by mouth daily. Provider, Historical   azelastine (ASTEPRO) 0.15 % (205.5 mcg) two (2) times a day. Provider, Historical   phenylephrine (NEOSYNEPHRINE) 0.5 % spry 1 Spray every six (6) hours as needed.     Provider, Historical   varicella-zoster recombinant, PF, (SHINGRIX, PF,) 50 mcg/0.5 mL susr injection 0.5mL by IntraMUSCular route once now and then repeat in 2-6 months 3/20/19   Ino Pascual MD   calcium carbonate (OS-FLY) 500 mg calcium (1,250 mg) tablet Take  by mouth daily. Provider, Historical   dorzolamide-timolol (COSOPT) 22.3-6.8 mg/mL ophthalmic solution Administer 1 Drop to both eyes two (2) times a day. 5/22/17   Provider, Historical   cholecalciferol, vitamin D3, (VITAMIN D3) 2,000 unit tab Take  by mouth. Provider, Historical        Review of Systems: History obtained from the patient  Constitutional: negative for weight loss, fever, night sweats  GI: negative for change in bowel habits, abdominal pain, black or bloody stools  : negative for frequency, dysuria, hematuria, vaginal discharge  MSK: negative for back pain, joint pain, muscle pain  Skin: negative for itching, rash, hives elsewhere  Neuro: negative for dizziness, headache, confusion, weakness  Psych: negative for anxiety, depression, change in mood  Heme/lymph: negative for bleeding, bruising, pallor    Objective: There were no vitals taken for this visit. Physical Exam:   PHYSICAL EXAMINATION    Constitutional  · Appearance: well-nourished, well developed, alert, in no acute distress    Gastrointestinal  · Abdominal Examination: abdomen non-tender to palpation, normal bowel sounds, no masses present  · Liver and spleen: no hepatomegaly present, spleen not palpable  · Hernias: no hernias identified    Genitourinary  · External Genitalia: pt had 1 small sebaceous cyst present at 10 o'clock lateral to the right labia majora. This cyst was easily lances and sebaceus fluid was extracted. · Vagina: normal vaginal vault without central or paravaginal defects, no discharge present, no inflammatory lesions present, no masses present, a biopsy was performed in the area of the vaginal pain.   · Bladder: non-tender to palpation  · Urethra: appears normal  · Cervix: normal   · Uterus: normal size, shape and consistency  · Adnexa: no adnexal tenderness present, no adnexal masses present  · Perineum: perineum within normal limits, no evidence of trauma, no rashes or skin lesions present  · Anus: anus within normal limits, no hemorrhoids present  · Inguinal Lymph Nodes: no lymphadenopathy present    Skin  · General Inspection: no rash, no lesions identified    Neurologic/Psychiatric  · Mental Status:  · Orientation: grossly oriented to person, place and time  · Mood and Affect: mood normal, affect appropriate    Assessment/Plan:   Sebaceous cyst- reassurance provided  Vaginal pain- will f/u with biopsy and will start estrogen cream.    RTO prn if symptoms persist or worsen. Instructions given to pt. Handouts given to pt. [facility]  OFFICE PROCEDURE PROGRESS NOTE        Chart reviewed for the following:   Cameron Johnson MD, have reviewed the History, Physical and updated the Allergic reactions for [facility] performed immediately prior to start of procedure:   Cameron Johnson MD, have performed the following reviews on Lucian David prior to the start of the procedure:            * Patient was identified by name and date of birth   * Agreement on procedure being performed was verified  * Risks and Benefits explained to the patient  * Procedure site verified and marked as necessary  * Patient was positioned for comfort  * Consent was signed and verified     Time: 11:00    Date of procedure: 4/28/2020    Procedure performed by:  Anel Mas MD    Provider assisted by: Alexadnro Mccollum    Patient assisted by: self    How tolerated by patient: tolerated the procedure well with no complications    Post Procedural Pain Scale: 0 - No Hurt    Procedure note: Vaginal biopsy    Indications:  Lucian David is a 70 y.o. female [facility] that was found to have pain at a specific location within the vagina.  After being presented with the risks, benefits and specific details of the procedure, she had no further questions. Procedure: The patient was placed on the table in a dorsal lithotomy position and draped in the appropriate manner. The area was prepped with Zephiran . Once cleansed, the Poyntan biopsy instrument was used to biopsy the area of vaginal pain. The specimen was placed in formalin and sent to pathology for evaluation. Pressure was applied to the biopsy site to control bleeding and no sutures were placed to close the wound. Hemostasis was adequate with direct pressure and silver nitrate. The patient tolerated the procedure well. There were no complications. She was observed for 10 minutes and was discharged in good condition.

## 2020-05-02 LAB
DX ICD CODE: NORMAL
DX ICD CODE: NORMAL
PATH REPORT.FINAL DX SPEC: NORMAL
PATH REPORT.GROSS SPEC: NORMAL
PATH REPORT.SITE OF ORIGIN SPEC: NORMAL
PATHOLOGIST NAME: NORMAL
PAYMENT PROCEDURE: NORMAL

## 2020-06-08 ENCOUNTER — OFFICE VISIT (OUTPATIENT)
Dept: INTERNAL MEDICINE CLINIC | Age: 80
End: 2020-06-08

## 2020-06-08 VITALS
WEIGHT: 151 LBS | DIASTOLIC BLOOD PRESSURE: 92 MMHG | BODY MASS INDEX: 25.16 KG/M2 | HEIGHT: 65 IN | RESPIRATION RATE: 12 BRPM | TEMPERATURE: 97.4 F | OXYGEN SATURATION: 100 % | SYSTOLIC BLOOD PRESSURE: 174 MMHG | HEART RATE: 72 BPM

## 2020-06-08 DIAGNOSIS — H91.93 BILATERAL HEARING LOSS, UNSPECIFIED HEARING LOSS TYPE: ICD-10-CM

## 2020-06-08 DIAGNOSIS — M54.16 LUMBAR RADICULOPATHY, ACUTE: Primary | ICD-10-CM

## 2020-06-08 DIAGNOSIS — R03.0 WHITE COAT SYNDROME WITHOUT DIAGNOSIS OF HYPERTENSION: ICD-10-CM

## 2020-06-08 DIAGNOSIS — M54.31 SCIATICA OF RIGHT SIDE: ICD-10-CM

## 2020-06-08 RX ORDER — METHYLPREDNISOLONE 4 MG/1
TABLET ORAL
Qty: 1 DOSE PACK | Refills: 0 | Status: SHIPPED | OUTPATIENT
Start: 2020-06-08 | End: 2020-07-10 | Stop reason: ALTCHOICE

## 2020-06-08 NOTE — PROGRESS NOTES
HISTORY OF PRESENT ILLNESS  La Nena Calero is a 70 y.o. female. HPI  Presents to office accompanied by her  with complaints of right sided hip pain that radiates down right lateral leg to right foot for the past month. Pain began several days after she had engaged in different floor exercises but denies any sudden pain during activity. Having burning pain down right lateral legs and has had tingling into right foot. Has been seen by Dr Glen Mckeon DO in [place] and had xrays taken; was prescribed Tens unit and also had cortisone injection without relief. Reports she was diagnosed with Lumbar scoliosis, DDD, Lumbar radiculitis and spondylosis. Was not treated with any oral medications for current symptoms. Denies loss of bowel or bladder control; denies pelvic paresthesias. Complains of bilateral hearing loss that has been progressively worsening over the past several weeks. Notes that hearing has been muffled. Denies tinnitus, ear pain, head congestion, sneezing. Her blood pressure is quite high in office today. She regularly checks blood pressure at home and typically runs in 120's/70's range. 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     Patient Active Problem List   Diagnosis Code    Advanced care planning/counseling discussion Z71.89     Past Surgical History:   Procedure Laterality Date    HX BREAST BIOPSY Left yrs ago    neg    HX ORTHOPAEDIC Right 2019    Bone Spur     HX TONSILLECTOMY       Social History     Socioeconomic History    Marital status:      Spouse name: Not on file    Number of children: Not on file    Years of education: Not on file    Highest education level: Not on file   Occupational History    Not on file   Social Needs    Financial resource strain: Not on file    Food insecurity     Worry: Not on file     Inability: Not on file    Transportation needs     Medical: Not on file     Non-medical: Not on file   Tobacco Use    Smoking status: Never Smoker    Smokeless tobacco: Never Used   Substance and Sexual Activity    Alcohol use: Yes     Alcohol/week: 1.0 standard drinks     Types: 1 Glasses of wine per week     Frequency: 2-3 times a week     Comment: rarely    Drug use: No    Sexual activity: Yes     Partners: Male     Birth control/protection: None   Lifestyle    Physical activity     Days per week: Not on file     Minutes per session: Not on file    Stress: Not on file   Relationships    Social connections     Talks on phone: Not on file     Gets together: Not on file     Attends Gnosticist service: Not on file     Active member of club or organization: Not on file     Attends meetings of clubs or organizations: Not on file     Relationship status: Not on file    Intimate partner violence     Fear of current or ex partner: Not on file     Emotionally abused: Not on file     Physically abused: Not on file     Forced sexual activity: Not on file   Other Topics Concern    Not on file   Social History Narrative    Retired from [address] [place], [place] [address]         to  healthy    2 children daughters : healthy    2 grandson's : healthy     Family History   Problem Relation Age of Onset    No Known Problems Mother      Current Outpatient Medications   Medication Sig    methylPREDNISolone (MEDROL DOSEPACK) 4 mg tablet Take as directed    calcium-cholecalciferol, D3, (CALTRATE 600+D) tablet Take 1 Tab by mouth daily.  dorzolamide-timolol (COSOPT) 22.3-6.8 mg/mL ophthalmic solution Administer 1 Drop to both eyes two (2) times a day.  latanoprost (XALATAN) 0.005 % ophthalmic solution Administer 1 Drop to both eyes daily.  estradioL (ESTRACE) 0.01 % (0.1 mg/gram) vaginal cream Insert 1 g into vagina two (2) days a week.  azelastine (ASTEPRO) 0.15 % (205.5 mcg) two (2) times a day.  calcium carbonate (OS-FLY) 500 mg calcium (1,250 mg) tablet Take  by mouth daily.     cholecalciferol, vitamin D3, (VITAMIN D3) 2,000 unit tab Take  by mouth. No current facility-administered medications for this visit. No Known Allergies  Immunization History   Administered Date(s) Administered    Pneumococcal Polysaccharide (PPSV-23) 01/17/2013    Tdap 01/17/2013    Zoster Vaccine, Live 07/28/2016       Review of Systems   Constitutional: Negative for chills, fever and malaise/fatigue. HENT: Positive for hearing loss. Negative for congestion, ear pain, sore throat and tinnitus. Eyes: Negative for blurred vision. Respiratory: Negative for cough and shortness of breath. Cardiovascular: Negative for chest pain and palpitations. Gastrointestinal: Negative for nausea and vomiting. Musculoskeletal: Positive for back pain. Skin: Negative for rash. Neurological: Positive for tingling. Negative for dizziness and headaches. Psychiatric/Behavioral: Negative for depression. The patient is not nervous/anxious. BP (!) 174/92 (BP 1 Location: Left arm, BP Patient Position: Sitting) Comment: manual cuff  Pulse 72   Temp 97.4 °F (36.3 °C) (Oral)   Resp 12   Ht 5' 5\" (1.651 m)   Wt 151 lb (68.5 kg)   SpO2 100%   BMI 25.13 kg/m²   Physical Exam  Vitals signs and nursing note reviewed. Constitutional:       General: She is not in acute distress. Appearance: Normal appearance. She is normal weight. HENT:      Head: Normocephalic and atraumatic. Right Ear: Tympanic membrane, ear canal and external ear normal. There is no impacted cerumen. Left Ear: Tympanic membrane, ear canal and external ear normal. There is no impacted cerumen. Nose: Nose normal.      Mouth/Throat:      Mouth: Mucous membranes are moist.      Pharynx: Oropharynx is clear. Eyes:      Extraocular Movements: Extraocular movements intact. Neck:      Musculoskeletal: Normal range of motion and neck supple. Cardiovascular:      Rate and Rhythm: Normal rate and regular rhythm.    Pulmonary:      Effort: Pulmonary effort is normal. Breath sounds: Normal breath sounds. No wheezing or rhonchi. Musculoskeletal:      Lumbar back: She exhibits decreased range of motion. Back:    Lymphadenopathy:      Cervical: No cervical adenopathy. Skin:     General: Skin is warm and dry. Neurological:      General: No focal deficit present. Mental Status: She is alert and oriented to person, place, and time. Motor: No weakness. Comments: SLR negative; ambulates with antalgic gait; able to walk on heel and toes with slight difficulty. No foot drop or leg weakness detected         ASSESSMENT and PLAN  Diagnoses and all orders for this visit:    1. Lumbar radiculopathy, acute -- given gentle stretching exercises; if symptoms fail to improve, will send her for Lumbar MRI; also consider physical therapy. She has longstanding issues with Thoracic scoliosis and chronic pain. -     methylPREDNISolone (MEDROL DOSEPACK) 4 mg tablet; Take as directed    2. Sciatica of right side  -     methylPREDNISolone (MEDROL DOSEPACK) 4 mg tablet; Take as directed    3. Bilateral hearing loss, unspecified hearing loss type -- clinical exam appears normal; will have her see ENT for audiology testing.  -     REFERRAL TO ENT-OTOLARYNGOLOGY    4.  White coat syndrome without diagnosis of hypertension -- reports her readings at home are always normal.      lab results and schedule of future lab studies reviewed with patient  reviewed diet, exercise and weight control  reviewed medications and side effects in detail

## 2020-06-08 NOTE — PATIENT INSTRUCTIONS
Sciatica: Care Instructions Your Care Instructions Sciatica (say \"kke-SJ-kk-kuh\") is an irritation of one of the sciatic nerves, which come from the spinal cord in the lower back. The sciatic nerves and their branches extend down through the buttock to the foot. Sciatica can develop when an injured disc in the back irritates or presses against a spinal nerve root. Its main symptom is pain, numbness, or weakness that is often worse in the leg or foot than in the back. Sciatica often will improve and go away with time. Early treatment usually includes medicines and exercises to relieve pain. Follow-up care is a key part of your treatment and safety. Be sure to make and go to all appointments, and call your doctor if you are having problems. It's also a good idea to know your test results and keep a list of the medicines you take. How can you care for yourself at home? · Take pain medicines exactly as directed. ? If the doctor gave you a prescription medicine for pain, take it as prescribed. ? If you are not taking a prescription pain medicine, ask your doctor if you can take an over-the-counter medicine. · Use heat or ice to relieve pain. ? To apply heat, put a warm water bottle, heating pad set on low, or warm cloth on your back. Do not go to sleep with a heating pad on your skin. ? To use ice, put ice or a cold pack on the area for 10 to 20 minutes at a time. Put a thin cloth between the ice and your skin. · Avoid sitting if possible, unless it feels better than standing. · Alternate lying down with short walks. Increase your walking distance as you are able to without making your symptoms worse. · Do not do anything that makes your symptoms worse. When should you call for help? [id number] anytime you think you may need emergency care. For example, call if: 
· You are unable to move a leg at all. Call your doctor now or seek immediate medical care if: · You have new or worse symptoms in your legs or buttocks. Symptoms may include: 
? Numbness or tingling. ? Weakness. ? Pain. · You lose bladder or bowel control. Watch closely for changes in your health, and be sure to contact your doctor if: 
· You are not getting better as expected. Where can you learn more? Go to http://jayne-neelima.info/ Enter [phone] in the search box to learn more about \"Sciatica: Care Instructions. \" Current as of: March 2, 2020               Content Version: 12.5 © 5818-4693 [employer]. Care instructions adapted under license by [employer] (which disclaims liability or warranty for this information). If you have questions about a medical condition or this instruction, always ask your healthcare professional. [address] any warranty or liability for your use of this information. Elevated Blood Pressure: Care Instructions Your Care Instructions Blood pressure is a measure of how hard the blood pushes against the walls of your arteries. It's normal for blood pressure to go up and down throughout the day. But if it stays up over time, you have high blood pressure. Two numbers tell you your blood pressure. The first number is the systolic pressure. It shows how hard the blood pushes when your heart is pumping. The second number is the diastolic pressure. It shows how hard the blood pushes between heartbeats, when your heart is relaxed and filling with blood. An ideal blood pressure in adults is less than 120/80 (say \"120 over 80\"). High blood pressure is 140/90 or higher. You have high blood pressure if your top number is 140 or higher or your bottom number is 90 or higher, or both. The main test for high blood pressure is simple, fast, and painless. To diagnose high blood pressure, your doctor will test your blood pressure at different times.  After testing your blood pressure, your doctor may ask you to test it again when you are home. If you are diagnosed with high blood pressure, you can work with your doctor to make a long-term plan to manage it. Follow-up care is a key part of your treatment and safety. Be sure to make and go to all appointments, and call your doctor if you are having problems. It's also a good idea to know your test results and keep a list of the medicines you take. How can you care for yourself at home? · Do not smoke. Smoking increases your risk for heart attack and stroke. If you need help quitting, talk to your doctor about stop-smoking programs and medicines. These can increase your chances of quitting for good. · Stay at a healthy weight. · Try to limit how much sodium you eat to less than 2,300 milligrams (mg) a day. Your doctor may ask you to try to eat less than 1,500 mg a day. · Be physically active. Get at least 30 minutes of exercise on most days of the week. Walking is a good choice. You also may want to do other activities, such as running, swimming, cycling, or playing tennis or team sports. · Avoid or limit alcohol. Talk to your doctor about whether you can drink any alcohol. · Eat plenty of fruits, vegetables, and low-fat dairy products. Eat less saturated and total fats. · Learn how to check your blood pressure at home. When should you call for help? Call your doctor now or seek immediate medical care if: 
? · Your blood pressure is much higher than normal (such as 180/110 or higher). ? · You think high blood pressure is causing symptoms such as: ¨ Severe headache. ¨ Blurry vision. ? Watch closely for changes in your health, and be sure to contact your doctor if: 
? · You do not get better as expected. Where can you learn more? Go to http://jayne-neelima.info/. Enter Z037 in the search box to learn more about \"Elevated Blood Pressure: Care Instructions. \" Current as of: September 21, 2016 Content Version: 11.4 © 0308-9126 [employer]. Care instructions adapted under license by [employer] (which disclaims liability or warranty for this information). If you have questions about a medical condition or this instruction, always ask your healthcare professional. [address] any warranty or liability for your use of this information. Back Stretches: Exercises Introduction Here are some examples of exercises for stretching your back. Start each exercise slowly. Ease off the exercise if you start to have pain. Your doctor or physical therapist will tell you when you can start these exercises and which ones will work best for you. How to do the exercises Overhead stretch 1. Stand comfortably with your feet shoulder-width apart. 2. Looking straight ahead, raise both arms over your head and reach toward the ceiling. Do not allow your head to tilt back. 3. Hold for 15 to 30 seconds, then lower your arms to your sides. 4. Repeat 2 to 4 times. Side stretch 1. Stand comfortably with your feet shoulder-width apart. 2. Raise one arm over your head, and then lean to the other side. 3. Slide your hand down your leg as you let the weight of your arm gently stretch your side muscles. Hold for 15 to 30 seconds. 4. Repeat 2 to 4 times on each side. Press-up 1. Lie on your stomach, supporting your body with your forearms. 2. Press your elbows down into the floor to raise your upper back. As you do this, relax your stomach muscles and allow your back to arch without using your back muscles. As your press up, do not let your hips or pelvis come off the floor. 3. Hold for 15 to 30 seconds, then relax. 4. Repeat 2 to 4 times. Relax and rest  
1. Lie on your back with a rolled towel under your neck and a pillow under your knees. Extend your arms comfortably to your sides. 2. Relax and breathe normally. 3. Remain in this position for about 10 minutes. 4. If you can, do this 2 or 3 times each day. Follow-up care is a key part of your treatment and safety. Be sure to make and go to all appointments, and call your doctor if you are having problems. It's also a good idea to know your test results and keep a list of the medicines you take. Where can you learn more? Go to http://jayne-neelima.info/ Enter S071 in the search box to learn more about \"Back Stretches: Exercises. \" Current as of: March 2, 2020               Content Version: 12.5 © 0384-9009 [employer]. Care instructions adapted under license by [employer] (which disclaims liability or warranty for this information). If you have questions about a medical condition or this instruction, always ask your healthcare professional. [address] any warranty or liability for your use of this information.

## 2020-06-09 ENCOUNTER — PATIENT MESSAGE (OUTPATIENT)
Dept: INTERNAL MEDICINE CLINIC | Age: 80
End: 2020-06-09

## 2020-06-18 DIAGNOSIS — M54.31 RIGHT SIDED SCIATICA: Primary | ICD-10-CM

## 2020-06-18 RX ORDER — HYDROCODONE BITARTRATE AND ACETAMINOPHEN 5; 325 MG/1; MG/1
1 TABLET ORAL
Qty: 20 TAB | Refills: 0 | Status: SHIPPED | OUTPATIENT
Start: 2020-06-18 | End: 2020-06-25

## 2020-06-23 RX ORDER — CYCLOBENZAPRINE HCL 10 MG
TABLET ORAL
Qty: 30 TAB | Refills: 0 | Status: SHIPPED | OUTPATIENT
Start: 2020-06-23 | End: 2020-07-20

## 2020-06-25 ENCOUNTER — TELEPHONE (OUTPATIENT)
Dept: INTERNAL MEDICINE CLINIC | Age: 80
End: 2020-06-25

## 2020-06-25 NOTE — TELEPHONE ENCOUNTER
Nurse initiated prior-authorization request via CoverMyMeds for patient's Cyclobenzaprine HCl 10mg prescription. Requested clinical supporting documentation submitted. Awaiting final determination from Ruth Johnson. Art# [id number] Rx# [id number] & PA# [id number] pending.

## 2020-06-25 NOTE — TELEPHONE ENCOUNTER
Spoke with [facility] pharmacist & notified them that patient's cyclobenzaprine HCl has been approved by [facility]: PA# [id number] (good from 1/1/20-12/31/20) RX# [id number] (CoverMyMeds Key: [id number]). Pharmacist will process prescription & notify patient.

## 2020-07-09 NOTE — TELEPHONE ENCOUNTER
From: Kimberli Christensen  To: Dyllan Landis NP  Sent: 6/9/2020 11:22 AM EDT  Subject: Visit Follow-Up Question    Giovanny Blair, I am happy to report that my condition has markedly improved! I actually slept all night with no pain after taking the Day 1 regimen (methylprednisolone). Thank you so much. Will keep you posted. Avinash Paredes    PS: Wicho Das says it was a pleasure meeting you and thank you as well!

## 2020-07-09 NOTE — TELEPHONE ENCOUNTER
I called back and no answer. LM on personal VM that her appt is schedule with CAROLINA Neal tomorrow, Friday, July 10, 2020 09:40 AM. Please call back for questions. [employer] message sent as well.

## 2020-07-10 ENCOUNTER — OFFICE VISIT (OUTPATIENT)
Dept: INTERNAL MEDICINE CLINIC | Age: 80
End: 2020-07-10

## 2020-07-10 ENCOUNTER — HOSPITAL ENCOUNTER (OUTPATIENT)
Dept: MRI IMAGING | Age: 80
Discharge: HOME | End: 2020-07-10
Attending: NURSE PRACTITIONER
Payer: MEDICARE

## 2020-07-10 ENCOUNTER — TELEPHONE (OUTPATIENT)
Dept: INTERNAL MEDICINE CLINIC | Age: 80
End: 2020-07-10

## 2020-07-10 VITALS
OXYGEN SATURATION: 100 % | RESPIRATION RATE: 18 BRPM | HEART RATE: 69 BPM | DIASTOLIC BLOOD PRESSURE: 90 MMHG | WEIGHT: 154.13 LBS | HEIGHT: 65 IN | BODY MASS INDEX: 25.68 KG/M2 | SYSTOLIC BLOOD PRESSURE: 167 MMHG | TEMPERATURE: 98.2 F

## 2020-07-10 DIAGNOSIS — M54.16 LUMBAR RADICULOPATHY, CHRONIC: ICD-10-CM

## 2020-07-10 DIAGNOSIS — M53.86 SCIATICA OF RIGHT SIDE ASSOCIATED WITH DISORDER OF LUMBAR SPINE: Primary | ICD-10-CM

## 2020-07-10 DIAGNOSIS — M53.86 SCIATICA OF RIGHT SIDE ASSOCIATED WITH DISORDER OF LUMBAR SPINE: ICD-10-CM

## 2020-07-10 DIAGNOSIS — M51.36 DDD (DEGENERATIVE DISC DISEASE), LUMBAR: ICD-10-CM

## 2020-07-10 PROCEDURE — 72148 MRI LUMBAR SPINE W/O DYE: CPT

## 2020-07-10 RX ORDER — IBUPROFEN 200 MG
TABLET ORAL
COMMUNITY
End: 2020-10-05 | Stop reason: ALTCHOICE

## 2020-07-10 RX ORDER — HYDROCODONE BITARTRATE AND ACETAMINOPHEN 5; 325 MG/1; MG/1
1 TABLET ORAL
COMMUNITY
End: 2020-07-16 | Stop reason: SDUPTHER

## 2020-07-10 RX ORDER — HYDROCODONE BITARTRATE AND ACETAMINOPHEN 5; 325 MG/1; MG/1
1 TABLET ORAL
Qty: 30 TAB | Refills: 0 | Status: SHIPPED | OUTPATIENT
Start: 2020-07-10 | End: 2020-07-17

## 2020-07-10 RX ORDER — METHYLPREDNISOLONE 4 MG/1
TABLET ORAL
Qty: 1 DOSE PACK | Refills: 0 | Status: SHIPPED | OUTPATIENT
Start: 2020-07-10 | End: 2020-07-30 | Stop reason: ALTCHOICE

## 2020-07-10 NOTE — PROGRESS NOTES
HISTORY OF PRESENT ILLNESS  Ute Morin is a 70 y.o. female. HPI  Presents for follow up right sided lower back pain with sciatica right leg and numbness/tingling right foot. Symptoms present for 2 months and began several days after she had engaged in different floor exercises. Has been seen by Dr Williams Sam DO in [place] and had xrays taken; was prescribed Tens unit and also had cortisone injection without relief. Reports she was diagnosed with Lumbar scoliosis, DDD, Lumbar radiculitis and spondylosis from xray findings. Denies loss of bowel or bladder control; denies pelvic paresthesias. Reports some temporary improvement with Medrol dose pack and has been going to PT for the past 3 weeks where she has had manual massage, tens use and mechanical traction without improvement. Has been taking Ibuprofen 400 mg three times daily and takes Hydrocodone 5 mg at bedtime which was helping until the past few days where pain at night has increased and she is unable to sleep. Has taken Flexeril 10 mg on prn basis with minimal relief. Notes increased radiating pain in right leg and right foot paresthesias after prolonged sitting but has been able to walk without difficulty.     Patient Active Problem List   Diagnosis Code    Advanced care planning/counseling discussion Z71.89     Past Surgical History:   Procedure Laterality Date    HX BREAST BIOPSY Left yrs ago    neg    HX ORTHOPAEDIC Right 2019    Bone Spur     HX TONSILLECTOMY       Social History     Socioeconomic History    Marital status:      Spouse name: Not on file    Number of children: Not on file    Years of education: Not on file    Highest education level: Not on file   Occupational History    Not on file   Social Needs    Financial resource strain: Not on file    Food insecurity     Worry: Not on file     Inability: Not on file    Transportation needs     Medical: Not on file     Non-medical: Not on file   Tobacco Use    Smoking status: Never Smoker    Smokeless tobacco: Never Used   Substance and Sexual Activity    Alcohol use: Yes     Alcohol/week: 1.0 standard drinks     Types: 1 Glasses of wine per week     Frequency: 2-3 times a week     Comment: rarely    Drug use: No    Sexual activity: Yes     Partners: Male     Birth control/protection: None   Lifestyle    Physical activity     Days per week: Not on file     Minutes per session: Not on file    Stress: Not on file   Relationships    Social connections     Talks on phone: Not on file     Gets together: Not on file     Attends [employer]ism service: Not on file     Active member of club or organization: Not on file     Attends meetings of clubs or organizations: Not on file     Relationship status: Not on file    Intimate partner violence     Fear of current or ex partner: Not on file     Emotionally abused: Not on file     Physically abused: Not on file     Forced sexual activity: Not on file   Other Topics Concern    Not on file   Social History Narrative    Retired from [address] [place], [place] [id number]         to  healthy    2 children daughters : healthy    2 grandson's : healthy     Family History   Problem Relation Age of Onset    No Known Problems Mother      Current Outpatient Medications   Medication Sig    HYDROcodone-acetaminophen (NORCO) 5-325 mg per tablet Take 1 Tab by mouth every eight (8) hours as needed for Pain.  ibuprofen (MOTRIN) 200 mg tablet Take  by mouth every eight (8) hours as needed for Pain.  methylPREDNISolone (MEDROL DOSEPACK) 4 mg tablet Take as directed    HYDROcodone-acetaminophen (NORCO) 5-325 mg per tablet Take 1 Tab by mouth every six (6) hours as needed for Pain for up to 7 days. Max Daily Amount: 4 Tabs.  cyclobenzaprine (FLEXERIL) 10 mg tablet Take 1-2 tabs every 8 hours as needed for muscle spasms    dorzolamide-timolol (COSOPT) 22.3-6.8 mg/mL ophthalmic solution Administer 1 Drop to both eyes two (2) times a day.     latanoprost (XALATAN) 0.005 % ophthalmic solution Administer 1 Drop to both eyes daily.  estradioL (ESTRACE) 0.01 % (0.1 mg/gram) vaginal cream Insert 1 g into vagina two (2) days a week.  calcium-cholecalciferol, D3, (CALTRATE 600+D) tablet Take 1 Tab by mouth daily.  azelastine (ASTEPRO) 0.15 % (205.5 mcg) two (2) times a day.  calcium carbonate (OS-FLY) 500 mg calcium (1,250 mg) tablet Take  by mouth daily.  cholecalciferol, vitamin D3, (VITAMIN D3) 2,000 unit tab Take  by mouth. No current facility-administered medications for this visit. No Known Allergies  Immunization History   Administered Date(s) Administered    Pneumococcal Polysaccharide (PPSV-23) 01/17/2013    Tdap 01/17/2013    Zoster Vaccine, Live 07/28/2016       Review of Systems   Constitutional: Positive for malaise/fatigue. Negative for chills and fever. HENT: Negative for congestion and sore throat. Respiratory: Negative for cough and shortness of breath. Cardiovascular: Negative for chest pain and palpitations. Gastrointestinal: Negative for abdominal pain, blood in stool, constipation, diarrhea, nausea and vomiting. Genitourinary: Negative for dysuria, frequency and hematuria. Musculoskeletal: Positive for back pain. Skin: Negative for rash. Neurological: Positive for tingling (right foot). Negative for headaches. Psychiatric/Behavioral: Negative for depression. The patient is nervous/anxious. Physical Exam  Vitals signs and nursing note reviewed. Constitutional:       General: She is not in acute distress. Appearance: Normal appearance. HENT:      Head: Normocephalic and atraumatic. Neck:      Musculoskeletal: Normal range of motion and neck supple. Cardiovascular:      Rate and Rhythm: Normal rate and regular rhythm. Pulses: Normal pulses. Heart sounds: Normal heart sounds. Pulmonary:      Effort: Pulmonary effort is normal.      Breath sounds: Normal breath sounds.  No wheezing or rhonchi. Musculoskeletal:      Lumbar back: She exhibits tenderness. She exhibits no spasm. Back:       Comments: Faintly positive SLR on right; no loss of muscle strength noted   Skin:     General: Skin is warm and dry. Neurological:      General: No focal deficit present. Mental Status: She is alert and oriented to person, place, and time. ASSESSMENT and PLAN  Diagnoses and all orders for this visit:    1. Sciatica of right side associated with disorder of lumbar spine -- had temporary improvement with Medrol dose pack in early June; will retreat but also get her in for MRI to assess for nerve impingement. Advised that she may take 1/2 tab of Hydrocodone during day for pain not responding to Ibuprofen and can take 1 1/2 tab - 2 tabs at bedtime. Advised to hold Ibuprofen while on steroids and strongly cautioned regarding sedation and increased fall risk and she and  indicated understanding.  -     MRI LUMB SPINE WO CONT; Future  -     methylPREDNISolone (MEDROL DOSEPACK) 4 mg tablet; Take as directed  -     HYDROcodone-acetaminophen (NORCO) 5-325 mg per tablet; Take 1 Tab by mouth every six (6) hours as needed for Pain for up to 7 days. Max Daily Amount: 4 Tabs. 2. Lumbar radiculopathy, chronic -- will await results of MRI and then consider referral to Ortho spine specialist versus Neurosurgeon  -     MRI LUMB SPINE WO CONT; Future  -     methylPREDNISolone (MEDROL DOSEPACK) 4 mg tablet; Take as directed  -     HYDROcodone-acetaminophen (NORCO) 5-325 mg per tablet; Take 1 Tab by mouth every six (6) hours as needed for Pain for up to 7 days. Max Daily Amount: 4 Tabs. 3. DDD (degenerative disc disease), lumbar  -     methylPREDNISolone (MEDROL DOSEPACK) 4 mg tablet; Take as directed  -     HYDROcodone-acetaminophen (NORCO) 5-325 mg per tablet; Take 1 Tab by mouth every six (6) hours as needed for Pain for up to 7 days. Max Daily Amount: 4 Tabs.       lab results and schedule of future lab studies reviewed with patient  reviewed diet, exercise and weight control  reviewed medications and side effects in detail  radiology results and schedule of future radiology studies reviewed with patient

## 2020-07-10 NOTE — TELEPHONE ENCOUNTER
Left message on personal voice mail with proper ID regarding results of Lumbar MRI showing multiple levels of DDD and stenosis. Will refer to spine specialist on Monday for further evaluation.

## 2020-07-13 ENCOUNTER — TELEPHONE (OUTPATIENT)
Dept: INTERNAL MEDICINE CLINIC | Age: 80
End: 2020-07-13

## 2020-07-13 NOTE — TELEPHONE ENCOUNTER
----- Message from Thais Gama NP sent at 7/10/2020  5:28 PM EDT -----  Can you call over to Eduarda Arnold or Dr Lashonda Samuels or any spine specialist for appointment for her? Multiple levels of DDD lumbar spine with foraminal stenosis and chronic sciatica right side.   thanks  ----- Message -----  From: Jovanni Neal Results In  Sent: 7/10/2020   3:46 PM EDT  To: Thais Gama NP

## 2020-07-13 NOTE — TELEPHONE ENCOUNTER
I called [facility], pts appt is schedule for 07/24/2020 with Dr. Parris Benson at the [facility] location. Pt needs to arrive 11am with a mask on for an 1130am appt. Please call back for questions.

## 2020-07-16 DIAGNOSIS — M51.36 DDD (DEGENERATIVE DISC DISEASE), LUMBAR: ICD-10-CM

## 2020-07-16 DIAGNOSIS — M54.16 LUMBAR RADICULOPATHY, CHRONIC: ICD-10-CM

## 2020-07-16 RX ORDER — HYDROCODONE BITARTRATE AND ACETAMINOPHEN 5; 325 MG/1; MG/1
1 TABLET ORAL
Qty: 30 TAB | Refills: 0 | Status: SHIPPED | OUTPATIENT
Start: 2020-07-16 | End: 2020-07-23

## 2020-07-27 ENCOUNTER — PATIENT MESSAGE (OUTPATIENT)
Dept: INTERNAL MEDICINE CLINIC | Age: 80
End: 2020-07-27

## 2020-07-28 ENCOUNTER — VIRTUAL VISIT (OUTPATIENT)
Dept: INTERNAL MEDICINE CLINIC | Age: 80
End: 2020-07-28

## 2020-07-28 DIAGNOSIS — M54.31 SCIATICA OF RIGHT SIDE: Primary | ICD-10-CM

## 2020-07-28 RX ORDER — GABAPENTIN 100 MG/1
CAPSULE ORAL
Qty: 90 CAP | Refills: 0 | Status: SHIPPED | OUTPATIENT
Start: 2020-07-28 | End: 2020-08-13

## 2020-07-28 RX ORDER — GABAPENTIN 100 MG/1
CAPSULE ORAL
Qty: 90 CAP | Refills: 0 | Status: SHIPPED | OUTPATIENT
Start: 2020-07-28 | End: 2020-07-28 | Stop reason: SDUPTHER

## 2020-07-28 RX ORDER — HYDROCODONE BITARTRATE AND ACETAMINOPHEN 5; 325 MG/1; MG/1
TABLET ORAL
COMMUNITY
Start: 2020-07-16 | End: 2020-07-30 | Stop reason: ALTCHOICE

## 2020-07-28 NOTE — PROGRESS NOTES
Consent: Shelby Li, who was seen by synchronous (real-time) audio-video technology, and/or her healthcare decision maker, is aware that this patient-initiated, Telehealth encounter on 7/28/2020 is a billable service, with coverage as determined by her insurance carrier. She is aware that she may receive a bill and has provided verbal consent to proceed: YES      712  Assessment & Plan:   Diagnoses and all orders for this visit:    1. Sciatica of right side  Patient's history taken by patient and . Clinically patient is not improving despite physical therapy, steroid Dosepak x2 and opioids. Patient will try gabapentin and increase as needed. Side effects discussed  We discussed that if the gabapentin is not working we may need to switch or add on Cymbalta. If she is getting some traction with gabapentin or Cymbalta we can have her re-evaluate physical therapy to see if this will further decrease her pain. -     gabapentin (NEURONTIN) 100 mg capsule; Take 1 po at night x 3 nights then increase to 2 po at night and May take 1 po in the am if not sedating. Do not take with other sed meds      Patient will follow-up with me if symptoms are not improving. I spent 25 minutes with this patient and his wife and greater than for percent of the time was spent in management and counseling with patient's persistent sciatica type pain. We reviewed current treatment plan and discussed potential other plans if symptoms are not improving. Subjective:   Shelby iL is a 70 y.o. female who was seen for Medication Refill and Back Pain (Pt states having siatica pain)    Patient and  are on video visit.  notes that patient saw Skyler Ochoa. Patient also Saw PT (8 sessions and then dry needling, lumbar stretching)  no benefit, took steroids, evaluation by Dr. Irma Alston and MRI, referred to pain specialistL4-L5 mild spondylosis  Dr. Pb Chance. She reports she has been having sx since May 2020.   She denies trauma, heavy exercise or exercise, heavy lifting or pulling. She reports she is sleeping okay but she is having pain during the daytime. She notes it is interfering with her activities of daily living. She is having difficulty standing to do a prepare meals. She is able to toilet without difficulty. She does not have any incontinence. Current Outpatient Medications   Medication Sig    HYDROcodone-acetaminophen (NORCO) 5-325 mg per tablet Take  by mouth.  cyclobenzaprine (FLEXERIL) 10 mg tablet TAKE ONE TO TWO TABLETS BY MOUTH EVERY 8 HOURS AS NEEDED FOR MUSCLE SPASMS    ibuprofen (MOTRIN) 200 mg tablet Take  by mouth every eight (8) hours as needed for Pain.  calcium-cholecalciferol, D3, (CALTRATE 600+D) tablet Take 1 Tab by mouth daily.  dorzolamide-timolol (COSOPT) 22.3-6.8 mg/mL ophthalmic solution Administer 1 Drop to both eyes two (2) times a day.  latanoprost (XALATAN) 0.005 % ophthalmic solution Administer 1 Drop to both eyes daily.  methylPREDNISolone (MEDROL DOSEPACK) 4 mg tablet Take as directed (Patient not taking: Reported on 7/28/2020)    estradioL (ESTRACE) 0.01 % (0.1 mg/gram) vaginal cream Insert 1 g into vagina two (2) days a week.  azelastine (ASTEPRO) 0.15 % (205.5 mcg) two (2) times a day.  calcium carbonate (OS-FLY) 500 mg calcium (1,250 mg) tablet Take  by mouth daily.  cholecalciferol, vitamin D3, (VITAMIN D3) 2,000 unit tab Take  by mouth. No current facility-administered medications for this visit. No Known Allergies  Subjective    Past Medical History:   Diagnosis Date    Fracture of left foot 06/2018    Glaucoma     Dr. Reg Silva    Pap smear for cervical cancer screening     2009 neg 2010 neg 2012 neg    Right shoulder pain        ROS  All other systems reviewed and negative, unless mentioned in HPI    Objective:   Vital Signs: (As obtained by patient/caregiver at home)  There were no vitals taken for this visit.      [INSTRUCTIONS: \"[x]\" Indicates a positive item  \"[]\" Indicates a negative item  -- DELETE ALL ITEMS NOT EXAMINED]    Constitutional: [x] Appears well-developed and well-nourished [x] No apparent distress      [] Abnormal -     Mental status: [x] Alert and awake  [x] Oriented to person/place/time [x] Able to follow commands    [] Abnormal -     Eyes:   EOM    [x]  Normal    [] Abnormal -   Sclera  [x]  Normal    [] Abnormal -          Discharge [x]  None visible   [] Abnormal -     HENT: [x] Normocephalic, atraumatic  [] Abnormal -   [x] Mouth/Throat: Mucous membranes are moist    External Ears [x] Normal  [] Abnormal -    Neck: [x] No visualized mass [] Abnormal -     Pulmonary/Chest: [x] Respiratory effort normal   [x] No visualized signs of difficulty breathing or respiratory distress        [] Abnormal -      Musculoskeletal:   [x] Normal gait with no signs of ataxia         [x] Normal range of motion of neck        [] Abnormal -     Neurological:        [x] No Facial Asymmetry (Cranial nerve 7 motor function) (limited exam due to video visit)          [x] No gaze palsy        [] Abnormal -          Skin:        [x] No significant exanthematous lesions or discoloration noted on facial skin         [] Abnormal -            Psychiatric:       [x] Normal Affect [] Abnormal -        [x] No Hallucinations    Other pertinent observable physical exam findings:-      We discussed the expected course, resolution and complications of the diagnosis(es) in detail. Medication risks, benefits, costs, interactions, and alternatives were discussed as indicated. I advised her to contact the office if her condition worsens, changes or fails to improve as anticipated. She expressed understanding with the diagnosis(es) and plan. Shayy Kelly is a 70 y.o. female being evaluated by a video visit encounter for concerns as above. A caregiver was present when appropriate.  Due to this being a TeleHealth encounter (During COM-06 public health emergency), evaluation of the following organ systems was limited: Vitals/Constitutional/EENT/Resp/CV/GI//MS/Neuro/Skin/Heme-Lymph-Imm. Pursuant to the emergency declaration under the [facility] waiver authority and the [employer] Act, this Virtual  Visit was conducted, with patient's (and/or legal guardian's) consent, to reduce the patient's risk of exposure to COVID-19 and provide necessary medical care. Services were provided through a video synchronous discussion virtually to substitute for in-person clinic visit. Patient and provider were located at their individual homes.     Kieran Sands MD

## 2020-07-28 NOTE — PROGRESS NOTES
Octaviano Martinez is a 70 y.o. female    Chief Complaint   Patient presents with    Medication Refill    Back Pain     Pt states having siatica pain     Health Maintenance Due   Topic Date Due    Shingrix Vaccine Age 49> (1 of 2) 11/03/1992    GLAUCOMA SCREENING Q2Y  07/10/2019    Medicare Yearly Exam  03/20/2020       1. Have you been to the ER, urgent care clinic since your last visit? Hospitalized since your last visit? No    2. Have you seen or consulted any other health care providers outside of the [address] [place] [address] since your last visit? Include any pap smears or colon screening.  No

## 2020-07-28 NOTE — TELEPHONE ENCOUNTER
From: Leroy Arias  To: Rocio Weinberg NP  Sent: 7/27/2020 1:16 PM EDT  Subject: Visit Follow-Up Question    Ahsok Landerosdows,    My visit last Friday (July 24th) with Dr. Parris Benson resulted in a referral to [address] [place] [address] [facility] for the purpose of a steroid injection at the site. The referral was based on his diagnosis that a grade 1 spondylolisthesis (L4-L5) is causing the problem. I do not yet have a date scheduled for the procedure; am finishing up the paperwork today. I have diligently taken the pain medications as you prescribed, but have only one day remaining of the hydrocodone/acetaminophen prescription (4 tablets). However, that prescription is no longer very effective, so maybe you could recommend something more effective. Thank you.     Abigail Spivey

## 2020-07-30 ENCOUNTER — OFFICE VISIT (OUTPATIENT)
Dept: INTERNAL MEDICINE CLINIC | Age: 80
End: 2020-07-30

## 2020-07-30 VITALS
SYSTOLIC BLOOD PRESSURE: 144 MMHG | WEIGHT: 151.25 LBS | DIASTOLIC BLOOD PRESSURE: 88 MMHG | TEMPERATURE: 98.8 F | OXYGEN SATURATION: 100 % | RESPIRATION RATE: 18 BRPM | BODY MASS INDEX: 25.2 KG/M2 | HEIGHT: 65 IN | HEART RATE: 84 BPM

## 2020-07-30 DIAGNOSIS — M54.31 SCIATICA OF RIGHT SIDE: ICD-10-CM

## 2020-07-30 DIAGNOSIS — M81.0 AGE-RELATED OSTEOPOROSIS WITHOUT CURRENT PATHOLOGICAL FRACTURE: ICD-10-CM

## 2020-07-30 DIAGNOSIS — R20.2 PARESTHESIA: ICD-10-CM

## 2020-07-30 DIAGNOSIS — Z12.11 SCREEN FOR COLON CANCER: ICD-10-CM

## 2020-07-30 DIAGNOSIS — Z13.31 SCREENING FOR DEPRESSION: ICD-10-CM

## 2020-07-30 DIAGNOSIS — Z00.00 MEDICARE ANNUAL WELLNESS VISIT, SUBSEQUENT: Primary | ICD-10-CM

## 2020-07-30 DIAGNOSIS — Z13.39 SCREENING FOR ALCOHOLISM: ICD-10-CM

## 2020-07-30 RX ORDER — ZOSTER VACCINE RECOMBINANT, ADJUVANTED 50 MCG/0.5
KIT INTRAMUSCULAR
Qty: 0.5 ML | Refills: 1 | Status: SHIPPED | OUTPATIENT
Start: 2020-07-30 | End: 2021-04-15

## 2020-07-30 NOTE — PATIENT INSTRUCTIONS
Medicare Wellness Visit, Female The best way to live healthy is to have a lifestyle where you eat a well-balanced diet, exercise regularly, limit alcohol use, and quit all forms of tobacco/nicotine, if applicable. Regular preventive services are another way to keep healthy. Preventive services (vaccines, screening tests, monitoring & exams) can help personalize your care plan, which helps you manage your own care. Screening tests can find health problems at the earliest stages, when they are easiest to treat. [facility] follows the current, evidence-based guidelines published by the [facility] ([facility]) when recommending preventive services for our patients. Because we follow these guidelines, sometimes recommendations change over time as research supports it. (For example, mammograms used to be recommended annually. Even though Medicare will still pay for an annual mammogram, the newer guidelines recommend a mammogram every two years for women of average risk). Of course, you and your doctor may decide to screen more often for some diseases, based on your risk and your co-morbidities (chronic disease you are already diagnosed with). Preventive services for you include: - Medicare offers their members a free annual wellness visit, which is time for you and your primary care provider to discuss and plan for your preventive service needs. Take advantage of this benefit every year! 
-All adults over the age of 72 should receive the recommended pneumonia vaccines. Current USPSTF guidelines recommend a series of two vaccines for the best pneumonia protection.  
-All adults should have a flu vaccine yearly and a tetanus vaccine every 10 years.  
-All adults age 48 and older should receive the shingles vaccines (series of two vaccines). -All adults age 38-68 who are overweight should have a diabetes screening test once every three years. -All adults born between [birth year] and 1963 should be screened once for Hepatitis C. 
-Other screening tests and preventive services for persons with diabetes include: an eye exam to screen for diabetic retinopathy, a kidney function test, a foot exam, and stricter control over your cholesterol.  
-Cardiovascular screening for adults with routine risk involves an electrocardiogram (ECG) at intervals determined by your doctor.  
-Colorectal cancer screenings should be done for adults age 54-65 with no increased risk factors for colorectal cancer. There are a number of acceptable methods of screening for this type of cancer. Each test has its own benefits and drawbacks. Discuss with your doctor what is most appropriate for you during your annual wellness visit. The different tests include: colonoscopy (considered the best screening method), a fecal occult blood test, a fecal DNA test, and sigmoidoscopy. 
 
-A bone mass density test is recommended when a woman turns 65 to screen for osteoporosis. This test is only recommended one time, as a screening. Some providers will use this same test as a disease monitoring tool if you already have osteoporosis. -Breast cancer screenings are recommended every other year for women of normal risk, age 54-69. 
-Cervical cancer screenings for women over age 72 are only recommended with certain risk factors. Here is a list of your current Health Maintenance items (your personalized list of preventive services) with a due date: 
Health Maintenance Due Topic Date Due  Shingles Vaccine (1 of 2) 11/03/1992  Glaucoma Screening   07/10/2019 Kaykay Nash Annual Well Visit  03/20/2020 Learning About How to Have a Healthy Back What causes back pain? Back pain is often caused by overuse, strain, or injury. For example, people often hurt their backs playing sports or working in the yard, being jolted in a car accident, or lifting something too heavy. Aging plays a part too. Your bones and muscles tend to lose strength as you age, which makes injury more likely. The spongy discs between the bones of the spine (vertebrae) may suffer from wear and tear and no longer provide enough cushion between the bones. A disc that bulges or breaks open (herniated disc) can press on nerves, causing back pain. In some people, back pain is the result of arthritis, broken vertebrae caused by bone loss (osteoporosis), illness, or a spine problem. Although most people have back pain at one time or another, there are steps you can take to make it less likely. How can you have a healthy back? Reduce stress on your back through good posture Slumping or slouching alone may not cause low back pain. But after the back has been strained or injured, bad posture can make pain worse. · Sleep in a position that maintains your back's normal curves and on a mattress that feels comfortable. Sleep on your side with a pillow between your knees, or sleep on your back with a pillow under your knees. These positions can reduce strain on your back. · Stand and sit up straight. \"Good posture\" generally means your ears, shoulders, and hips are in a straight line. · If you must stand for a long time, put one foot on a stool, ledge, or box. Switch feet every now and then. · Sit in a chair that is low enough to let you place both feet flat on the floor with both knees nearly level with your hips. If your chair or desk is too high, use a footrest to raise your knees. Place a small pillow, a rolled-up towel, or a lumbar roll in the curve of your back if you need extra support. · Try a kneeling chair, which helps tilt your hips forward. This takes pressure off your lower back. · Try sitting on an exercise ball. It can rock from side to side, which helps keep your back loose. · When driving, keep your knees nearly level with your hips.  Sit straight, and drive with both hands on the steering wheel. Your arms should be in a slightly bent position. Reduce stress on your back through careful lifting · Squat down, bending at the hips and knees only. If you need to, put one knee to the floor and extend your other knee in front of you, bent at a right angle (half kneeling). · Press your chest straight forward. This helps keep your upper back straight while keeping a slight arch in your low back. · Hold the load as close to your body as possible, at the level of your belly button (navel). · Use your feet to change direction, taking small steps. · Lead with your hips as you change direction. Keep your shoulders in line with your hips as you move. · Set down your load carefully, squatting with your knees and hips only. Exercise and stretch your back · Do some exercise on most days of the week, if your doctor says it is okay. You can walk, run, swim, or cycle. · Stretch your back muscles. Here are a few exercises to try: 
? Lie on your back, and gently pull one bent knee to your chest. Put that foot back on the floor, and then pull the other knee to your chest. 
? Do pelvic tilts. Lie on your back with your knees bent. Tighten your stomach muscles. Pull your belly button (navel) in and up toward your ribs. You should feel like your back is pressing to the floor and your hips and pelvis are slightly lifting off the floor. Hold for 6 seconds while breathing smoothly. ? Sit with your back flat against a wall. · Keep your core muscles strong. The muscles of your back, belly (abdomen), and buttocks support your spine. ? Pull in your belly and imagine pulling your navel toward your spine. Hold this for 6 seconds, then relax. Remember to keep breathing normally as you tense your muscles. ? Do curl-ups. Always do them with your knees bent.  Keep your low back on the floor, and curl your shoulders toward your knees using a smooth, slow motion. Keep your arms folded across your chest. If this bothers your neck, try putting your hands behind your neck (not your head), with your elbows spread apart. ? Lie on your back with your knees bent and your feet flat on the floor. Tighten your belly muscles, and then push with your feet and raise your buttocks up a few inches. Hold this position 6 seconds as you continue to breathe normally, then lower yourself slowly to the floor. Repeat 8 to 12 times. ? If you like group exercise, try Pilates or yoga. These classes have poses that strengthen the core muscles. Lead a healthy lifestyle · Stay at a healthy weight to avoid strain on your back. · Do not smoke. Smoking increases the risk of osteoporosis, which weakens the spine. If you need help quitting, talk to your doctor about stop-smoking programs and medicines. These can increase your chances of quitting for good. Where can you learn more? Go to http://jayneNew Visionneelima.info/ Enter L315 in the search box to learn more about \"Learning About How to Have a Healthy Back. \" Current as of: March 2, 2020               Content Version: 12.5 © 0799-7024 Healthwise, [employer]. Care instructions adapted under license by [employer] (which disclaims liability or warranty for this information). If you have questions about a medical condition or this instruction, always ask your healthcare professional. [address] any warranty or liability for your use of this information. Piriformis Syndrome: Exercises Introduction Here are some examples of exercises for you to try. The exercises may be suggested for a condition or for rehabilitation. Start each exercise slowly. Ease off the exercises if you start to have pain. You will be told when to start these exercises and which ones will work best for you. How to do the exercises Hip rotator stretch 1. Lie on your back with both knees bent and your feet flat on the floor. 2. Put the ankle of your affected leg on your opposite thigh near your knee. 3. Use your hand to gently push your knee (on your affected leg) away from your body until you feel a gentle stretch around your hip. 4. Hold the stretch for 15 to 30 seconds. 5. Repeat 2 to 4 times. 6. Switch legs and repeat steps 1 through 5. Piriformis stretch 1. Lie on your back with your legs straight. 2. Lift your affected leg and bend your knee. With your opposite hand, reach across your body, and then gently pull your knee toward your opposite shoulder. 3. Hold the stretch for 15 to 30 seconds. 4. Repeat with your other leg. 5. Repeat 2 to 4 times on each side. Lower abdominal strengthening 1. Lie on your back with your knees bent and your feet flat on the floor. 2. Tighten your belly muscles by pulling your belly button in toward your spine. 3. Lift one foot off the floor and bring your knee toward your chest, so that your knee is straight above your hip and your leg is bent like the letter \"L. \" 
4. Lift the other knee up to the same position. 5. Lower one leg at a time to the starting position. 6. Keep alternating legs until you have lifted each leg 8 to 12 times. 7. Be sure to keep your belly muscles tight and your back still as you are moving your legs. Be sure to breathe normally. Follow-up care is a key part of your treatment and safety. Be sure to make and go to all appointments, and call your doctor if you are having problems. It's also a good idea to know your test results and keep a list of the medicines you take. Current as of: March 2, 2020               Content Version: 12.5 © 2855-1179 Healthwise, Incorporated. Care instructions adapted under license by [employer] (which disclaims liability or warranty for this information).  If you have questions about a medical condition or this instruction, always ask your healthcare professional. [address] any warranty or liability for your use of this information. Piriformis Syndrome: Care Instructions Your Care Instructions The piriformis muscle is deep under your rear end (buttock). One end of the muscle connects deep inside the pelvic area, and the other end attaches to the top of the thighbone. This muscle can press on the sciatic nerve that runs from your spine down your leg. When this happens, you may have pain, numbness, and tingling in the buttock and down the back of your leg. This is called piriformis syndrome. The pain may get worse when you sit for a long time or climb stairs. Also, you may be more likely to develop piriformis syndrome if you run or walk often. Your doctor will check for other causes of your pain before treating this syndrome. Treatment may include stretching exercises, massage, and medicine for the pain and swelling. If these do not help, you may get a shot of steroid medicine. Until the pain is gone, you may need to rest the muscle and limit activities like running. Exercises and a change in how you move and sit may be enough to stop the pressure on the nerve. Follow-up care is a key part of your treatment and safety. Be sure to make and go to all appointments, and call your doctor if you are having problems. It's also a good idea to know your test results and keep a list of the medicines you take. How can you care for yourself at home? · If your doctor thinks that strenuous exercise is causing your problem, stop or cut back on activities such as running. You may find swimming to be a good exercise for a while. · Stretch the piriformis muscle. ? Lie on your back. ? Bend one leg at the knee and keep the other leg flat on the ground. ? Raise your bent knee up and then move it across your body.  Hold the outside of the knee with the opposite hand. ? Gently pull the knee with your hand toward the opposite shoulder. ? Hold the stretch for at least 15 to 30 seconds. Switch legs. ? Do the stretch several times each day. · Massage the muscle to relieve pressure. ? Sit on the floor. Lean to one side so that the hip on your sore side is off the ground. Put a tennis ball under your buttock on that side. ? As you put weight onto the tennis ball, you may find spots that are especially sore. Move gently so that the tennis ball gently massages each of the sore spots. · Use ice or heat to help reduce pain. Put ice or a cold pack or a heating pad set on low or a warm cloth on the sore area for 10 to 20 minutes at a time. Put a thin cloth between the ice pack or heating pad and your skin. · Ask your doctor if you can take an over-the-counter pain medicine, such as acetaminophen (Tylenol), ibuprofen (Advil, Motrin), or naproxen (Aleve). Be safe with medicines. Read and follow all instructions on the label. · Have your doctor or a physical therapist watch how you move. You may need physical therapy or special inserts in your shoes (orthotics) to help you move in a way that does not put pressure on your nerves. When should you call for help? Watch closely for changes in your health, and be sure to contact your doctor if: 
· You do not feel better after several weeks of home care. · Your pain gets worse. · Your leg becomes weak or numb. Where can you learn more? Go to http://www.gray.com/ Enter Q229 in the search box to learn more about \"Piriformis Syndrome: Care Instructions. \" Current as of: March 2, 2020               Content Version: 12.5 © 0692-9933 [employer]. Care instructions adapted under license by [employer] (which disclaims liability or warranty for this information).  If you have questions about a medical condition or this instruction, always ask your healthcare professional. [address] any warranty or liability for your use of this information.

## 2020-07-30 NOTE — PROGRESS NOTES
Patient presents to follow-up with regards to her back pain. She is also here for her Medicare wellness visit. This is the Subsequent Medicare Annual Wellness Exam, performed 12 months or more after the Initial AWV or the last Subsequent AWV    I have reviewed the patient's medical history in detail and updated the computerized patient record. History     Patient Active Problem List   Diagnosis Code    Advanced care planning/counseling discussion Z71.89     Past Medical History:   Diagnosis Date    Fracture of left foot 06/2018    Glaucoma     Dr. Altamirano Naval    Pap smear for cervical cancer screening     2009 neg 2010 neg 2012 neg    Right shoulder pain       Past Surgical History:   Procedure Laterality Date    HX BREAST BIOPSY Left yrs ago    neg    HX ORTHOPAEDIC Right 2019    Bone Spur     HX TONSILLECTOMY       Current Outpatient Medications   Medication Sig Dispense Refill    varicella-zoster recombinant, PF, (Shingrix, PF,) 50 mcg/0.5 mL susr injection 0.5mL by IntraMUSCular route once now and then repeat in 2-6 months 0.5 mL 1    gabapentin (NEURONTIN) 100 mg capsule Take 1 po at night x 3 nights then increase to 2 po at night and May take 1 po in the am if not sedating. Do not take with other sed meds 90 Cap 0    ibuprofen (MOTRIN) 200 mg tablet Take  by mouth every eight (8) hours as needed for Pain.  dorzolamide-timolol (COSOPT) 22.3-6.8 mg/mL ophthalmic solution Administer 1 Drop to both eyes two (2) times a day.  latanoprost (XALATAN) 0.005 % ophthalmic solution Administer 1 Drop to both eyes daily.  estradioL (ESTRACE) 0.01 % (0.1 mg/gram) vaginal cream Insert 1 g into vagina two (2) days a week. 1 Tube 11    calcium-cholecalciferol, D3, (CALTRATE 600+D) tablet Take 1 Tab by mouth daily.  azelastine (ASTEPRO) 0.15 % (205.5 mcg) two (2) times a day.  calcium carbonate (OS-FLY) 500 mg calcium (1,250 mg) tablet Take  by mouth daily.       cholecalciferol, vitamin D3, (VITAMIN D3) 2,000 unit tab Take  by mouth. No Known Allergies    Family History   Problem Relation Age of Onset    No Known Problems Mother      Social History     Tobacco Use    Smoking status: Never Smoker    Smokeless tobacco: Never Used   Substance Use Topics    Alcohol use: Yes     Alcohol/week: 1.0 standard drinks     Types: 1 Glasses of wine per week     Frequency: 2-3 times a week     Comment: rarely       Depression Risk Factor Screening:     3 most recent PHQ Screens 7/30/2020   Little interest or pleasure in doing things Not at all   Feeling down, depressed, irritable, or hopeless Not at all   Total Score PHQ 2 0       Alcohol Risk Factor Screening:   Do you average 1 drink per night or more than 7 drinks a week:  No    On any one occasion in the past three months have you have had more than 3 drinks containing alcohol:  No      Functional Ability and Level of Safety:   Hearing: Hearing is good. Activities of Daily Living: The home contains: no safety equipment. Patient does total self care     Ambulation: with no difficulty     Fall Risk:  Fall Risk Assessment, last 12 mths 7/30/2020   Able to walk? Yes   Fall in past 12 months?  No   Fall with injury? -   Number of falls in past 12 months -   Fall Risk Score -     Abuse Screen:  Patient is not abused       Cognitive Screening   Has your family/caregiver stated any concerns about your memory: no     Cognitive Screening: Normal - Verbal Fluency Test    Patient Care Team   Patient Care Team:  Anish Dutton MD as PCP - General (Internal Medicine)  Anish Dutton MD as PCP - [facility] Empaneled Provider  Jefe Bhatti MD as Physician (Obstetrics & Gynecology)    Assessment/Plan   Education and counseling provided:  Are appropriate based on today's review and evaluation  End-of-Life planning (with patient's consent)  Pneumococcal Vaccine  Screening Mammography  Colorectal cancer screening tests  Bone mass measurement (DEXA)  Screening for glaucoma    Diagnoses and all orders for this visit:    1. Medicare annual wellness visit, subsequent  -     varicella-zoster recombinant, PF, (Shingrix, PF,) 50 mcg/0.5 mL susr injection; 0.5mL by IntraMUSCular route once now and then repeat in 2-6 months  -     REFERRAL TO OPHTHALMOLOGY  Chief Complaint   Patient presents with   [facility]el Annual Wellness Visit     Patient presents for her Medicare wellness visit as above  She is also here for follow-up with regards to her sciatica. Sciatica  Patient reports that since she started the gabapentin her symptoms are slightly better. She reports her pain used to be a 6 out of 10 but now her pain is 4 out of 10. She is still able to sleep. She is still having some numbness and tingling in her foot. Her right foot sometimes also has pain. As noted on previous notes she has seen physical therapy and has seen Dr. [facility]AK[facility]. She does not eat red meat. She is not taking a B12 supplement. Osteoporosis  Patient's bone density was reviewed with her. She is not able to take bisphosphonates due to esophageal issues. She is aware that she has osteoporosis but has never pursued endocrine because she did not feel she wanted to initiate medication for osteoporosis yet. We discussed that she has a compression fracture at L1.     Past Medical History:   Diagnosis Date    Fracture of left foot 06/2018    Glaucoma     Dr. Miranda Husbands    Pap smear for cervical cancer screening     2009 neg 2010 neg 2012 neg    Right shoulder pain      Past Surgical History:   Procedure Laterality Date    HX BREAST BIOPSY Left yrs ago    neg    HX ORTHOPAEDIC Right 2019    Bone Spur     HX TONSILLECTOMY       Social History     Socioeconomic History    Marital status:      Spouse name: Not on file    Number of children: Not on file    Years of education: Not on file    Highest education level: Not on file   Tobacco Use    Smoking status: Never Smoker  Smokeless tobacco: Never Used   Substance and Sexual Activity    Alcohol use: Yes     Alcohol/week: 1.0 standard drinks     Types: 1 Glasses of wine per week     Frequency: 2-3 times a week     Comment: rarely    Drug use: No    Sexual activity: Yes     Partners: Male     Birth control/protection: None   Social History Narrative    Retired from [employer]         to  healthy    2 children daughters : healthy    2 grandson's : healthy     Family History   Problem Relation Age of Onset    No Known Problems Mother      Current Outpatient Medications   Medication Sig Dispense Refill    varicella-zoster recombinant, PF, (Shingrix, PF,) 50 mcg/0.5 mL susr injection 0.5mL by IntraMUSCular route once now and then repeat in 2-6 months 0.5 mL 1    gabapentin (NEURONTIN) 100 mg capsule Take 1 po at night x 3 nights then increase to 2 po at night and May take 1 po in the am if not sedating. Do not take with other sed meds 90 Cap 0    ibuprofen (MOTRIN) 200 mg tablet Take  by mouth every eight (8) hours as needed for Pain.  dorzolamide-timolol (COSOPT) 22.3-6.8 mg/mL ophthalmic solution Administer 1 Drop to both eyes two (2) times a day.  latanoprost (XALATAN) 0.005 % ophthalmic solution Administer 1 Drop to both eyes daily.  estradioL (ESTRACE) 0.01 % (0.1 mg/gram) vaginal cream Insert 1 g into vagina two (2) days a week. 1 Tube 11    calcium-cholecalciferol, D3, (CALTRATE 600+D) tablet Take 1 Tab by mouth daily.  azelastine (ASTEPRO) 0.15 % (205.5 mcg) two (2) times a day.  calcium carbonate (OS-FLY) 500 mg calcium (1,250 mg) tablet Take  by mouth daily.  cholecalciferol, vitamin D3, (VITAMIN D3) 2,000 unit tab Take  by mouth.        No Known Allergies    Review of Systems - General ROS: negative for - chills or fever  Cardiovascular ROS: no chest pain or dyspnea on exertion  Respiratory ROS: no cough, shortness of breath, or wheezing    Visit Vitals  /88 (BP 1 Location: Right arm, BP Patient Position: Sitting)   Pulse 84   Temp 98.8 °F (37.1 °C) (Oral)   Resp 18   Ht 5' 5\" (1.651 m)   Wt 151 lb 4 oz (68.6 kg)   SpO2 100%   BMI 25.17 kg/m²     General Appearance:  Well developed, well nourished,alert and oriented x 3, and individual in no acute distress. Ears/Nose/Mouth/Throat:   Hearing grossly normal.         Neck: Supple, no lad, no bruits   Chest:   Lungs clear to auscultation bilaterally. Cardiovascular:  Regular rate and rhythm, S1, S2 normal, no murmur. Abdomen:   Soft, non-tender, bowel sounds are active. Extremities: No edema bilaterally. Skin: Warm and dry, no suspicious lesions                 Diagnoses and all orders for this visit:        2. Age-related osteoporosis without current pathological fracture  Patient aware of osteoporosis  She does not want to start Reclast or initiate Prolia or Forteo. She was advised to see endocrinology in the past but deferred that. We will work on her back pain and alleviating the symptoms. She will then consider endocrinology  -     METABOLIC PANEL, COMPREHENSIVE; Future  -     REFERRAL TO ENDOCRINOLOGY  -     VITAMIN D, 25 HYDROXY; Future    3. Paresthesia  Continue gabapentin for now  I discussed with patient that I would like to continue her on this and as she is able to get relief we can eventually wean her off in the future. Will check for underlying neuropathy causes such as diabetes and B12 deficiency  She is agreeable that we need to fix underlying causes if possible. -     VITAMIN B12 & FOLATE; Future  -     CBC W/O DIFF; Future  -     HEMOGLOBIN A1C WITH EAG; Future    4. Screening for alcoholism    5. Screening for depression  -     DEPRESSION SCREEN ANNUAL    6. Screen for colon cancer  -     OCCULT BLOOD IMMUNOASSAY,DIAGNOSTIC; Future    7. Sciatica of right side  -     REFERRAL TO PHYSICAL THERAPY        5. Screening for depression  -     DEPRESSION SCREEN ANNUAL    6.  Screen for colon cancer  - OCCULT BLOOD IMMUNOASSAY,DIAGNOSTIC; Future    7. Sciatica of right side  -     REFERRAL TO PHYSICAL THERAPY        Aside from patient's Medicare wellness visit I spent an additional 15 minutes with this patient greater than 50% of the time was spent in management and counseling with regards to her persistent paresthesias and osteoporosis. In terms of her paresthesias we did note that her glucose level was elevated and she may have a diabetic neuropathy underlying. She is also not eating red meat and may have B12 deficiency. We also discussed her osteoporosis and she would like to defer further treatment of this until she gets her back pain under control. Follow-up in 2 to 3 months or earlier if needed she will also see her favorite physical therapist [facility] hopefully with the gabapentin in her system she will be able to tolerate physical therapy better.

## 2020-07-31 ENCOUNTER — HOSPITAL ENCOUNTER (OUTPATIENT)
Dept: MAMMOGRAPHY | Age: 80
Discharge: HOME | End: 2020-07-31
Attending: INTERNAL MEDICINE
Payer: MEDICARE

## 2020-07-31 DIAGNOSIS — Z12.31 BREAST CANCER SCREENING BY MAMMOGRAM: ICD-10-CM

## 2020-07-31 LAB
25(OH)D3+25(OH)D2 SERPL-MCNC: 26 NG/ML (ref 30–100)
ALBUMIN SERPL-MCNC: 4.5 G/DL (ref 3.7–4.7)
ALBUMIN/GLOB SERPL: 2 {RATIO} (ref 1.2–2.2)
ALP SERPL-CCNC: 54 IU/L (ref 39–117)
ALT SERPL-CCNC: 26 IU/L (ref 0–32)
AST SERPL-CCNC: 25 IU/L (ref 0–40)
BILIRUB SERPL-MCNC: 0.7 MG/DL (ref 0–1.2)
BUN SERPL-MCNC: 14 MG/DL (ref 8–27)
BUN/CREAT SERPL: 19 (ref 12–28)
CALCIUM SERPL-MCNC: 10 MG/DL (ref 8.7–10.3)
CHLORIDE SERPL-SCNC: 99 MMOL/L (ref 96–106)
CO2 SERPL-SCNC: 24 MMOL/L (ref 20–29)
CREAT SERPL-MCNC: 0.75 MG/DL (ref 0.57–1)
ERYTHROCYTE [DISTWIDTH] IN BLOOD BY AUTOMATED COUNT: 12.8 % (ref 11.7–15.4)
EST. AVERAGE GLUCOSE BLD GHB EST-MCNC: 97 MG/DL
FOLATE SERPL-MCNC: 15.3 NG/ML
GLOBULIN SER CALC-MCNC: 2.2 G/DL (ref 1.5–4.5)
GLUCOSE SERPL-MCNC: 153 MG/DL (ref 65–99)
HBA1C MFR BLD: 5 % (ref 4.8–5.6)
HCT VFR BLD AUTO: 42.7 % (ref 34–46.6)
HGB BLD-MCNC: 14.8 G/DL (ref 11.1–15.9)
MCH RBC QN AUTO: 31.2 PG (ref 26.6–33)
MCHC RBC AUTO-ENTMCNC: 34.7 G/DL (ref 31.5–35.7)
MCV RBC AUTO: 90 FL (ref 79–97)
PLATELET # BLD AUTO: 243 X10E3/UL (ref 150–450)
POTASSIUM SERPL-SCNC: 4.8 MMOL/L (ref 3.5–5.2)
PROT SERPL-MCNC: 6.7 G/DL (ref 6–8.5)
RBC # BLD AUTO: 4.75 X10E6/UL (ref 3.77–5.28)
SODIUM SERPL-SCNC: 140 MMOL/L (ref 134–144)
VIT B12 SERPL-MCNC: 637 PG/ML (ref 232–1245)
WBC # BLD AUTO: 6.3 X10E3/UL (ref 3.4–10.8)

## 2020-07-31 PROCEDURE — 77067 SCR MAMMO BI INCL CAD: CPT

## 2020-08-13 DIAGNOSIS — M54.31 SCIATICA OF RIGHT SIDE: ICD-10-CM

## 2020-08-13 RX ORDER — GABAPENTIN 300 MG/1
CAPSULE ORAL
Qty: 90 CAP | Refills: 0 | Status: SHIPPED | OUTPATIENT
Start: 2020-08-13 | End: 2020-08-14

## 2020-08-14 DIAGNOSIS — M54.31 SCIATICA OF RIGHT SIDE: ICD-10-CM

## 2020-08-14 RX ORDER — GABAPENTIN 300 MG/1
300 CAPSULE ORAL 3 TIMES DAILY
Qty: 90 CAP | Refills: 0 | Status: SHIPPED | OUTPATIENT
Start: 2020-08-14 | End: 2020-08-24 | Stop reason: SDUPTHER

## 2020-08-14 RX ORDER — GABAPENTIN 300 MG/1
300 CAPSULE ORAL 3 TIMES DAILY
Qty: 90 CAP | Refills: 0 | Status: SHIPPED | OUTPATIENT
Start: 2020-08-14 | End: 2020-08-14

## 2020-08-24 DIAGNOSIS — M54.31 SCIATICA OF RIGHT SIDE: ICD-10-CM

## 2020-08-24 RX ORDER — GABAPENTIN 300 MG/1
300 CAPSULE ORAL 3 TIMES DAILY
Qty: 90 CAP | Refills: 0 | Status: SHIPPED | OUTPATIENT
Start: 2020-08-24 | End: 2020-09-26

## 2020-09-26 DIAGNOSIS — M54.31 SCIATICA OF RIGHT SIDE: ICD-10-CM

## 2020-09-26 RX ORDER — GABAPENTIN 300 MG/1
300 CAPSULE ORAL
Qty: 90 CAP | Refills: 0 | Status: SHIPPED | OUTPATIENT
Start: 2020-09-26 | End: 2020-09-26 | Stop reason: SDUPTHER

## 2020-09-26 RX ORDER — GABAPENTIN 300 MG/1
300 CAPSULE ORAL
Qty: 90 CAP | Refills: 0 | Status: SHIPPED | OUTPATIENT
Start: 2020-09-26 | End: 2020-10-05 | Stop reason: ALTCHOICE

## 2020-10-05 ENCOUNTER — OFFICE VISIT (OUTPATIENT)
Dept: INTERNAL MEDICINE CLINIC | Age: 80
End: 2020-10-05
Payer: MEDICARE

## 2020-10-05 VITALS
OXYGEN SATURATION: 100 % | WEIGHT: 154 LBS | HEIGHT: 65 IN | DIASTOLIC BLOOD PRESSURE: 75 MMHG | TEMPERATURE: 98.4 F | BODY MASS INDEX: 25.66 KG/M2 | SYSTOLIC BLOOD PRESSURE: 129 MMHG | RESPIRATION RATE: 12 BRPM | HEART RATE: 64 BPM

## 2020-10-05 DIAGNOSIS — M54.6 CHRONIC BILATERAL THORACIC BACK PAIN: ICD-10-CM

## 2020-10-05 DIAGNOSIS — G89.29 CHRONIC BILATERAL THORACIC BACK PAIN: ICD-10-CM

## 2020-10-05 PROCEDURE — G8536 NO DOC ELDER MAL SCRN: HCPCS | Performed by: INTERNAL MEDICINE

## 2020-10-05 PROCEDURE — G8399 PT W/DXA RESULTS DOCUMENT: HCPCS | Performed by: INTERNAL MEDICINE

## 2020-10-05 PROCEDURE — G8427 DOCREV CUR MEDS BY ELIG CLIN: HCPCS | Performed by: INTERNAL MEDICINE

## 2020-10-05 PROCEDURE — 99214 OFFICE O/P EST MOD 30 MIN: CPT | Performed by: INTERNAL MEDICINE

## 2020-10-05 PROCEDURE — G8419 CALC BMI OUT NRM PARAM NOF/U: HCPCS | Performed by: INTERNAL MEDICINE

## 2020-10-05 PROCEDURE — 1090F PRES/ABSN URINE INCON ASSESS: CPT | Performed by: INTERNAL MEDICINE

## 2020-10-05 PROCEDURE — G8432 DEP SCR NOT DOC, RNG: HCPCS | Performed by: INTERNAL MEDICINE

## 2020-10-05 PROCEDURE — 1101F PT FALLS ASSESS-DOCD LE1/YR: CPT | Performed by: INTERNAL MEDICINE

## 2020-10-05 RX ORDER — CYCLOBENZAPRINE HCL 5 MG
5 TABLET ORAL
Qty: 20 TAB | Refills: 0 | Status: SHIPPED | OUTPATIENT
Start: 2020-10-05 | End: 2021-04-15

## 2020-10-05 NOTE — PROGRESS NOTES
Diagnoses and all orders for this visit:    1. BMI 25.0-25.9,adult  Patient interested in weight loss. Discussed heart healthy diabetic diet as her blood sugar was elevated before in the past.  Also discussed intermittent fasting but she did not think that this would be sustainable. We also discussed a low-carb diet which she is willing to try. Discussed with patient and weight loss may help with her pain    2. Chronic bilateral thoracic back pain  Patient with persistent chronic T7-T8 back pain  She will follow-up with Dr. Marcelina Swann with Dr. [facility] gabapentin did not help  We will try diagnostic trial of Flexeril  Other orders  -     cyclobenzaprine (FLEXERIL) 5 mg tablet; Take 1 Tab by mouth daily as needed for Muscle Spasm(s). May take total of 2 tabs per day  Side effects with regards to falls related to Flexeril. I spent 25 min with this patient and >50% of the time was spent on counseling and management of chronic low back pain thoracic and lumbar as well as BMI    Follow-up in 1 year or earlier if needed. She will follow-up with the pain specialist with regards to her thoracic spine if her muscle relaxant is not helping. Chief Complaint   Patient presents with    Pain (Chronic)     discuss medication      Lumbar back pain  Patient notes she is now able to sleep. Dr. Pastor Theodore gave her an injection with good benefit. She was able to stop gabapentin. She was taking gabapentin 3 times a day but now notes that it may be preventing her from sleeping. She did not make follow-up with her pain specialist because she was doing so well. She is able to sleep. Thoracic back pain  Patient reports that she has had a chronic issue with her thoracic back pain. This was not investigated or evaluated by the pain specialist yet. Discussed with patient that she should follow-up with them as she has been on gabapentin 3 times a day with no relief.   She notes her pain has been for several years.  She notes that when she is leaning over in the sink it causes a pulling sensation and it radiates from her mid spine near her bra to her front. She notes that it is keeping her from doing the dishes and cleaning and cooking. It does not seem to interfere with her sleep. BMI 25  She is interested in weight loss. She has not tried any specific diets. She has been doing walking but not consistent. She acknowledges that weight loss would probably help her with her spine  Past Medical History:   Diagnosis Date    Fracture of left foot 06/2018    Glaucoma     Dr. Maryann Akins    Pap smear for cervical cancer screening     2009 neg 2010 neg 2012 neg    Right shoulder pain      Past Surgical History:   Procedure Laterality Date    HX BREAST BIOPSY Left yrs ago    neg    HX ORTHOPAEDIC Right 2019    Bone Spur     HX OTHER SURGICAL  2020    Sciatic injection     HX TONSILLECTOMY       Social History     Socioeconomic History    Marital status:      Spouse name: Not on file    Number of children: Not on file    Years of education: Not on file    Highest education level: Not on file   Tobacco Use    Smoking status: Never Smoker    Smokeless tobacco: Never Used   Substance and Sexual Activity    Alcohol use: Yes     Alcohol/week: 1.0 standard drinks     Types: 1 Glasses of wine per week     Frequency: 2-3 times a week     Comment: rarely    Drug use: No    Sexual activity: Yes     Partners: Male     Birth control/protection: None   Social History Narrative    Retired from [employer]         to  healthy    2 children daughters : healthy    2 grandson's : healthy     Family History   Problem Relation Age of Onset    No Known Problems Mother      Current Outpatient Medications   Medication Sig Dispense Refill    calcium-cholecalciferol, D3, (CALTRATE 600+D) tablet Take 1 Tab by mouth daily.       dorzolamide-timolol (COSOPT) 22.3-6.8 mg/mL ophthalmic solution Administer 1 Drop to both eyes two (2) times a day.  latanoprost (XALATAN) 0.005 % ophthalmic solution Administer 1 Drop to both eyes daily.  varicella-zoster recombinant, PF, (Shingrix, PF,) 50 mcg/0.5 mL susr injection 0.5mL by IntraMUSCular route once now and then repeat in 2-6 months 0.5 mL 1    estradioL (ESTRACE) 0.01 % (0.1 mg/gram) vaginal cream Insert 1 g into vagina two (2) days a week. 1 Tube 11    azelastine (ASTEPRO) 0.15 % (205.5 mcg) two (2) times a day.  calcium carbonate (OS-FLY) 500 mg calcium (1,250 mg) tablet Take  by mouth daily.  cholecalciferol, vitamin D3, (VITAMIN D3) 2,000 unit tab Take  by mouth.        No Known Allergies    Review of Systems - General ROS: negative for - chills or fever  Cardiovascular ROS: no chest pain or dyspnea on exertion  Respiratory ROS: no cough, shortness of breath, or wheezing    Visit Vitals  /75 (BP 1 Location: Left arm, BP Patient Position: Sitting)   Pulse 64   Temp 98.4 °F (36.9 °C) (Oral)   Resp 12   Ht 5' 5\" (1.651 m)   Wt 154 lb (69.9 kg)   SpO2 100%   BMI 25.63 kg/m²           Constitutional: [x] Appears well-developed and well-nourished [x] No apparent distress      [] Abnormal -     Mental status: [x] Alert and awake  [x] Oriented to person/place/time [x] Able to follow commands    [] Abnormal -     Eyes:   EOM    [x]  Normal    [] Abnormal -   Sclera  [x]  Normal    [] Abnormal -          Discharge [x]  None visible   [] Abnormal -     HENT: [x] Normocephalic, atraumatic  [] Abnormal -   [x] Mouth/Throat: Mucous membranes are moist    External Ears [x] Normal  [] Abnormal -    Neck: [x] No visualized mass [] Abnormal -     Pulmonary/Chest: [x] Respiratory effort normal   [x] No visualized signs of difficulty breathing or respiratory distress        [] Abnormal -      Musculoskeletal:   [x] Normal gait with no signs of ataxia         [x] Normal range of motion of neck        [] Abnormal -   No pain on palpation of thoracic spine, mild tenderness paraspinal muscles at T7-T8    Neurological:        [x] No Facial Asymmetry (Cranial nerve 7 motor function) (limited exam due to video visit)          [x] No gaze palsy        [] Abnormal -    Brachial artery reflux for over 5 bilaterally          Skin:        [x] No significant exanthematous lesions or discoloration noted on facial skin         [] Abnormal -            Psychiatric:       [x] Normal Affect [] Abnormal -        [x] No Hallucinations

## 2020-10-05 NOTE — PATIENT INSTRUCTIONS
Learning About Low-Carbohydrate Diets What is a low-carbohydrate diet? A low-carbohydrate (or \"low-carb\") diet limits foods and drinks that have carbohydrates. This includes grains, fruits, milk and yogurt, and starchy vegetables like potatoes, beans, and corn. It also avoids foods and drinks that have added sugar. Instead, low-carb diets include foods that are high in protein and fat. Why might you follow a low-carb diet? Low-carb diets may be used for a variety of reasons, such as for weight loss. People who have diabetes may use a low-carb diet to help manage their blood sugar levels. What should you do before you start the diet? Talk to your doctor before you try any diet. This is even more important if you have health problems like kidney disease, heart disease, or diabetes. Your doctor may suggest that you meet with a registered dietitian. A dietitian can help you make an eating plan that works for you. What foods do you eat on a low-carb diet? On a low-carb diet, you choose foods that are high in protein and fat. Examples of these are: · Meat, poultry, and fish. · Eggs. · Nuts, such as walnuts, pecans, almonds, and peanuts. · Peanut butter and other nut butters. · Tofu. · Avocado. · Gar Hero. · Non-starchy vegetables like broccoli, cauliflower, green beans, mushrooms, peppers, lettuce, and spinach. · Unsweetened non-dairy milks like almond milk and coconut milk. · Cheese, cottage cheese, and cream cheese. Current as of: August 22, 2019               Content Version: 12.6 © 4586-2018 [employer]. Care instructions adapted under license by [employer] (which disclaims liability or warranty for this information). If you have questions about a medical condition or this instruction, always ask your healthcare professional. [address] any warranty or liability for your use of this information. DASH Diet: Care Instructions Your Care Instructions The DASH diet is an eating plan that can help lower your blood pressure. DASH stands for Dietary Approaches to Stop Hypertension. Hypertension is high blood pressure. The DASH diet focuses on eating foods that are high in calcium, potassium, and magnesium. These nutrients can lower blood pressure. The foods that are highest in these nutrients are fruits, vegetables, low-fat dairy products, nuts, seeds, and legumes. But taking calcium, potassium, and magnesium supplements instead of eating foods that are high in those nutrients does not have the same effect. The DASH diet also includes whole grains, fish, and poultry. The DASH diet is one of several lifestyle changes your doctor may recommend to lower your high blood pressure. Your doctor may also want you to decrease the amount of sodium in your diet. Lowering sodium while following the DASH diet can lower blood pressure even further than just the DASH diet alone. Follow-up care is a key part of your treatment and safety. Be sure to make and go to all appointments, and call your doctor if you are having problems. It's also a good idea to know your test results and keep a list of the medicines you take. How can you care for yourself at home? Following the DASH diet · Eat 4 to 5 servings of fruit each day. A serving is 1 medium-sized piece of fruit, ½ cup chopped or canned fruit, 1/4 cup dried fruit, or 4 ounces (½ cup) of fruit juice. Choose fruit more often than fruit juice. · Eat 4 to 5 servings of vegetables each day. A serving is 1 cup of lettuce or raw leafy vegetables, ½ cup of chopped or cooked vegetables, or 4 ounces (½ cup) of vegetable juice. Choose vegetables more often than vegetable juice. · Get 2 to 3 servings of low-fat and fat-free dairy each day. A serving is 8 ounces of milk, 1 cup of yogurt, or 1 ½ ounces of cheese. · Eat 6 to 8 servings of grains each day. A serving is 1 slice of bread, 1 ounce of dry cereal, or ½ cup of cooked rice, pasta, or cooked cereal. Try to choose whole-grain products as much as possible. · Limit lean meat, poultry, and fish to 2 servings each day. A serving is 3 ounces, about the size of a deck of cards. · Eat 4 to 5 servings of nuts, seeds, and legumes (cooked dried beans, lentils, and split peas) each week. A serving is 1/3 cup of nuts, 2 tablespoons of seeds, or ½ cup of cooked beans or peas. · Limit fats and oils to 2 to 3 servings each day. A serving is 1 teaspoon of vegetable oil or 2 tablespoons of salad dressing. · Limit sweets and added sugars to 5 servings or less a week. A serving is 1 tablespoon jelly or jam, ½ cup sorbet, or 1 cup of lemonade. · Eat less than 2,300 milligrams (mg) of sodium a day. If you limit your sodium to 1,500 mg a day, you can lower your blood pressure even more. Tips for success · Start small. Do not try to make dramatic changes to your diet all at once. You might feel that you are missing out on your favorite foods and then be more likely to not follow the plan. Make small changes, and stick with them. Once those changes become habit, add a few more changes. · Try some of the following: ? Make it a goal to eat a fruit or vegetable at every meal and at snacks. This will make it easy to get the recommended amount of fruits and vegetables each day. ? Try yogurt topped with fruit and nuts for a snack or healthy dessert. ? Add lettuce, tomato, cucumber, and onion to sandwiches. ? Combine a ready-made pizza crust with low-fat mozzarella cheese and lots of vegetable toppings. Try using tomatoes, squash, spinach, broccoli, carrots, cauliflower, and onions. ? Have a variety of cut-up vegetables with a low-fat dip as an appetizer instead of chips and dip. ? Sprinkle sunflower seeds or chopped almonds over salads. Or try adding chopped walnuts or almonds to cooked vegetables. ? Try some vegetarian meals using beans and peas. Add garbanzo or kidney beans to salads. Make burritos and tacos with mashed bradley beans or black beans. Where can you learn more? Go to http://www.gray.com/ Enter H291 in the search box to learn more about \"DASH Diet: Care Instructions. \" Current as of: December 16, 2019               Content Version: 12.6 © 2284-5118 [employer]. Care instructions adapted under license by [employer] (which disclaims liability or warranty for this information). If you have questions about a medical condition or this instruction, always ask your healthcare professional. [address] any warranty or liability for your use of this information.

## 2020-12-03 LAB — HEMOCCULT STL QL IA: NEGATIVE

## 2020-12-22 ENCOUNTER — TRANSCRIBE ORDER (OUTPATIENT)
Dept: GENERAL RADIOLOGY | Age: 80
End: 2020-12-22

## 2020-12-22 ENCOUNTER — HOSPITAL ENCOUNTER (OUTPATIENT)
Dept: GENERAL RADIOLOGY | Age: 80
Discharge: HOME | End: 2020-12-22
Attending: ANESTHESIOLOGY
Payer: MEDICARE

## 2020-12-22 DIAGNOSIS — G89.4 CHRONIC PAIN SYNDROME: Primary | ICD-10-CM

## 2020-12-22 DIAGNOSIS — G89.4 CHRONIC PAIN SYNDROME: ICD-10-CM

## 2020-12-22 PROCEDURE — 72072 X-RAY EXAM THORAC SPINE 3VWS: CPT

## 2020-12-23 ENCOUNTER — TELEPHONE (OUTPATIENT)
Dept: INTERNAL MEDICINE CLINIC | Age: 80
End: 2020-12-23

## 2020-12-23 NOTE — TELEPHONE ENCOUNTER
Returned call to pt. She states she was seen at [facility] and an xray was done. She was advised that the radiologist will review the xxray and they will reach back out to the patient with recommendations. She was given rx for Doxy incase she needs it after the radiologist reads the xray. She states she will follow up with [facility] today for recommendations.

## 2020-12-23 NOTE — TELEPHONE ENCOUNTER
----- Message from [address] [place] sent at 12/22/2020  4:10 PM EST -----  Regarding: Dr. Alex Martinez  General Message/Vendor Calls    Caller's first and last name: Pt      Reason for call: Provider/Nurse Call      Callback required yes/no and why:Yes       Best contact number(s): [phone]      Details to clarify the request: Pt stated on Sunday 12/20/20 they had symptoms of spitting up blood. No pain or other accompanying symptoms occurred. Pt visited [facility] on [address] and was assessed by a NP. X-Ray was preformed and there was something on pt's (R) Lung; NP advised pt an doctor will contact pt to clarify findings. Pt stated symptoms have not returned; requesting to speak to provider/nurse in regards to medical condition.        Ryne Collins Done

## 2020-12-26 LAB — SARS-COV-2, NAA: NOT DETECTED

## 2020-12-29 DIAGNOSIS — J18.9 PNEUMONIA OF RIGHT LOWER LOBE DUE TO INFECTIOUS ORGANISM: Primary | ICD-10-CM

## 2020-12-30 ENCOUNTER — TELEPHONE (OUTPATIENT)
Dept: INTERNAL MEDICINE CLINIC | Age: 80
End: 2020-12-30

## 2021-01-13 ENCOUNTER — OFFICE VISIT (OUTPATIENT)
Dept: INTERNAL MEDICINE CLINIC | Age: 81
End: 2021-01-13
Payer: MEDICARE

## 2021-01-13 ENCOUNTER — HOSPITAL ENCOUNTER (OUTPATIENT)
Dept: GENERAL RADIOLOGY | Age: 81
Discharge: HOME | End: 2021-01-13
Attending: INTERNAL MEDICINE
Payer: MEDICARE

## 2021-01-13 VITALS
TEMPERATURE: 98.2 F | OXYGEN SATURATION: 98 % | HEART RATE: 65 BPM | HEIGHT: 65 IN | SYSTOLIC BLOOD PRESSURE: 162 MMHG | DIASTOLIC BLOOD PRESSURE: 74 MMHG | BODY MASS INDEX: 24.49 KG/M2 | WEIGHT: 147 LBS | RESPIRATION RATE: 16 BRPM

## 2021-01-13 DIAGNOSIS — R73.09 ELEVATED GLUCOSE: ICD-10-CM

## 2021-01-13 DIAGNOSIS — Z87.01 HISTORY OF PNEUMONIA: ICD-10-CM

## 2021-01-13 DIAGNOSIS — I10 ESSENTIAL HYPERTENSION: ICD-10-CM

## 2021-01-13 DIAGNOSIS — M81.0 AGE-RELATED OSTEOPOROSIS WITHOUT CURRENT PATHOLOGICAL FRACTURE: Primary | ICD-10-CM

## 2021-01-13 DIAGNOSIS — M54.6 CHRONIC BILATERAL THORACIC BACK PAIN: ICD-10-CM

## 2021-01-13 DIAGNOSIS — G89.29 CHRONIC BILATERAL THORACIC BACK PAIN: ICD-10-CM

## 2021-01-13 LAB — HBA1C MFR BLD HPLC: 4.9 %

## 2021-01-13 PROCEDURE — 71046 X-RAY EXAM CHEST 2 VIEWS: CPT

## 2021-01-13 PROCEDURE — 1090F PRES/ABSN URINE INCON ASSESS: CPT | Performed by: INTERNAL MEDICINE

## 2021-01-13 PROCEDURE — G8432 DEP SCR NOT DOC, RNG: HCPCS | Performed by: INTERNAL MEDICINE

## 2021-01-13 PROCEDURE — G8536 NO DOC ELDER MAL SCRN: HCPCS | Performed by: INTERNAL MEDICINE

## 2021-01-13 PROCEDURE — 99214 OFFICE O/P EST MOD 30 MIN: CPT | Performed by: INTERNAL MEDICINE

## 2021-01-13 PROCEDURE — G8427 DOCREV CUR MEDS BY ELIG CLIN: HCPCS | Performed by: INTERNAL MEDICINE

## 2021-01-13 PROCEDURE — 83036 HEMOGLOBIN GLYCOSYLATED A1C: CPT | Performed by: INTERNAL MEDICINE

## 2021-01-13 PROCEDURE — G8420 CALC BMI NORM PARAMETERS: HCPCS | Performed by: INTERNAL MEDICINE

## 2021-01-13 PROCEDURE — G8754 DIAS BP LESS 90: HCPCS | Performed by: INTERNAL MEDICINE

## 2021-01-13 PROCEDURE — 1101F PT FALLS ASSESS-DOCD LE1/YR: CPT | Performed by: INTERNAL MEDICINE

## 2021-01-13 PROCEDURE — G8753 SYS BP > OR = 140: HCPCS | Performed by: INTERNAL MEDICINE

## 2021-01-13 RX ORDER — DOXYCYCLINE 100 MG/1
CAPSULE ORAL
COMMUNITY
Start: 2020-12-20 | End: 2021-01-13 | Stop reason: ALTCHOICE

## 2021-01-13 RX ORDER — AMLODIPINE BESYLATE 2.5 MG/1
2.5 TABLET ORAL DAILY
Qty: 30 TAB | Refills: 0 | Status: SHIPPED | OUTPATIENT
Start: 2021-01-13 | End: 2021-04-15

## 2021-01-13 NOTE — PATIENT INSTRUCTIONS
High Blood Pressure: Care Instructions Overview It's normal for blood pressure to go up and down throughout the day. But if it stays up, you have high blood pressure. Another name for high blood pressure is hypertension. Despite what a lot of people think, high blood pressure usually doesn't cause headaches or make you feel dizzy or lightheaded. It usually has no symptoms. But it does increase your risk of stroke, heart attack, and other problems. You and your doctor will talk about your risks of these problems based on your blood pressure. Your doctor will give you a goal for your blood pressure. Your goal will be based on your health and your age. Lifestyle changes, such as eating healthy and being active, are always important to help lower blood pressure. You might also take medicine to reach your blood pressure goal. 
Follow-up care is a key part of your treatment and safety. Be sure to make and go to all appointments, and call your doctor if you are having problems. It's also a good idea to know your test results and keep a list of the medicines you take. How can you care for yourself at home? Medical treatment · If you stop taking your medicine, your blood pressure will go back up. You may take one or more types of medicine to lower your blood pressure. Be safe with medicines. Take your medicine exactly as prescribed. Call your doctor if you think you are having a problem with your medicine. · Talk to your doctor before you start taking aspirin every day. Aspirin can help certain people lower their risk of a heart attack or stroke. But taking aspirin isn't right for everyone, because it can cause serious bleeding. · See your doctor regularly. You may need to see the doctor more often at first or until your blood pressure comes down. · If you are taking blood pressure medicine, talk to your doctor before you take decongestants or anti-inflammatory medicine, such as ibuprofen. Some of these medicines can raise blood pressure. · Learn how to check your blood pressure at home. Lifestyle changes · Stay at a healthy weight. This is especially important if you put on weight around the waist. Losing even 10 pounds can help you lower your blood pressure. · If your doctor recommends it, get more exercise. Walking is a good choice. Bit by bit, increase the amount you walk every day. Try for at least 30 minutes on most days of the week. You also may want to swim, bike, or do other activities. · Avoid or limit alcohol. Talk to your doctor about whether you can drink any alcohol. · Try to limit how much sodium you eat to less than 2,300 milligrams (mg) a day. Your doctor may ask you to try to eat less than 1,500 mg a day. · Eat plenty of fruits (such as bananas and oranges), vegetables, legumes, whole grains, and low-fat dairy products. · Lower the amount of saturated fat in your diet. Saturated fat is found in animal products such as milk, cheese, and meat. Limiting these foods may help you lose weight and also lower your risk for heart disease. · Do not smoke. Smoking increases your risk for heart attack and stroke. If you need help quitting, talk to your doctor about stop-smoking programs and medicines. These can increase your chances of quitting for good. When should you call for help? Call  911 anytime you think you may need emergency care. This may mean having symptoms that suggest that your blood pressure is causing a serious heart or blood vessel problem. Your blood pressure may be over 180/120. For example, call 911 if: 
  · You have symptoms of a heart attack. These may include: 
? Chest pain or pressure, or a strange feeling in the chest. 
? Sweating. ? Shortness of breath. ? Nausea or vomiting. ? Pain, pressure, or a strange feeling in the back, neck, jaw, or upper belly or in one or both shoulders or arms. ? Lightheadedness or sudden weakness. ? A fast or irregular heartbeat.  
  · You have symptoms of a stroke. These may include: 
? Sudden numbness, tingling, weakness, or loss of movement in your face, arm, or leg, especially on only one side of your body. ? Sudden vision changes. ? Sudden trouble speaking. ? Sudden confusion or trouble understanding simple statements. ? Sudden problems with walking or balance. ? A sudden, severe headache that is different from past headaches.  
  · You have severe back or belly pain. Do not wait until your blood pressure comes down on its own. Get help right away. Call your doctor now or seek immediate care if: 
  · Your blood pressure is much higher than normal (such as 180/120 or higher), but you don't have symptoms.  
  · You think high blood pressure is causing symptoms, such as: 
? Severe headache. 
? Blurry vision. Watch closely for changes in your health, and be sure to contact your doctor if: 
  · Your blood pressure measures higher than your doctor recommends at least 2 times. That means the top number is higher or the bottom number is higher, or both.  
  · You think you may be having side effects from your blood pressure medicine. Where can you learn more? Go to http://www.gray.com/ Enter Q182 in the search box to learn more about \"High Blood Pressure: Care Instructions. \" Current as of: December 16, 2019               Content Version: 12.6 © 6392-5483 [employer]. Care instructions adapted under license by [employer] (which disclaims liability or warranty for this information). If you have questions about a medical condition or this instruction, always ask your healthcare professional. [address] any warranty or liability for your use of this information. Zoledronic Acid (By injection) Zoledronic Acid ([id number] AS-id) Treats high blood calcium levels. Also treats bone damage caused by Paget disease, multiple myeloma, and cancers that spread to the bone. Also treats osteoporosis and reduces the risk of hip fractures in certain patients. Brand Name(s): PremierPro Rx Zoledronic Acid, Reclast, Zoledronic Acid Novaplus, Zometa There may be other brand names for this medicine. When This Medicine Should Not Be Used: This medicine is not right for everyone. You should not receive it if you had an allergic reaction to zoledronic acid, or if you are pregnant. How to Use This Medicine:  
Injectable · A nurse or other health provider will give you this medicine. · Your doctor will prescribe your dose and schedule. This medicine is given through a needle placed in a vein. · Your doctor may tell you to drink extra liquids before your treatment to prevent kidney problems. · Your doctor may also give you vitamin D and calcium supplements. Tell your doctor if you are not able to take these medicines. · This medicine should come with a Medication Guide. Ask your pharmacist for a copy if you do not have one. · Missed dose: You must use this medicine on a fixed schedule. Call your doctor or pharmacist if you miss a dose. Drugs and Foods to Avoid: Ask your doctor or pharmacist before using any other medicine, including over-the-counter medicines, vitamins, and herbal products. · Do not use other medicines that also contain zoledronic acid. Do not use zoledronic acid together with another bisphosphonate medicine. · Some foods and medicines can affect how zoledronic acid works. Tell your doctor if you are using digoxin, antibiotics, diuretics (water pills), an NSAID pain or arthritis medicine (such as aspirin, celecoxib, ibuprofen, naproxen), steroid medicines, or cancer medicines. Warnings While Using This Medicine: · It is not safe to take this medicine during pregnancy. It could harm an unborn baby. Tell your doctor right away if you become pregnant. · Tell your doctor if you are breastfeeding, or if you have kidney disease, anemia, aspirin-sensitive asthma, bleeding problems, cancer, congestive heart failure, low blood calcium levels, stomach absorption problems, mineral imbalance, dental problems or gum disease. Also tell your doctor if you had surgery on your bowel or parathyroid or thyroid gland. · This medicine may cause the following problems: ¨ Jaw or teeth problems ¨ Severe bone, joint, or muscle pain ¨ Increased risk of thigh bone fracture ¨ Low calcium levels in your blood · You must have regular dental exams while you are being treated with this medicine. Tell your dentist or oral surgeon that you are using this medicine. · Your doctor will do lab tests at regular visits to check on the effects of this medicine. Keep all appointments. Possible Side Effects While Using This Medicine:  
Call your doctor right away if you notice any of these side effects: · Allergic reaction: Itching or hives, swelling in your face or hands, swelling or tingling in your mouth or throat, chest tightness, trouble breathing · Chest pain, trouble breathing, fast or uneven heartbeat · Decrease in how much or how often you urinate, blood in the urine, lower back or side pain, burning or painful urination · Muscle spasm or twitching, or numbness or tingling in your fingers, feet, or around your mouth · Pain, swelling, or numbness in the mouth or jaw, loose teeth or other teeth problems · Severe muscle, bone, or joint pain · Unusual pain in your thigh, groin, or hip If you notice these less serious side effects, talk with your doctor: · Fever, chills, cough, sore throat, and body aches · Headache · Mild nausea, constipation, diarrhea, stomach pain or upset · Redness, pain, or swelling of your skin where the needle is placed If you notice other side effects that you think are caused by this medicine, tell your doctor. Call your doctor for medical advice about side effects. You may report side effects to FDA at 7-667-NJY-4910 © 2017 [facility] Information is for End User's use only and may not be sold, redistributed or otherwise used for commercial purposes. The above information is an  only. It is not intended as medical advice for individual conditions or treatments. Talk to your doctor, nurse or pharmacist before following any medical regimen to see if it is safe and effective for you. Zoledronic Acid (By injection) Zoledronic Acid ([id number]-[id number] AS-id) Treats high blood calcium levels. Also treats bone damage caused by Paget disease, multiple myeloma, and cancers that spread to the bone. Also treats osteoporosis and reduces the risk of hip fractures in certain patients. Brand Name(s): PremierPro Rx Zoledronic Acid, Reclast, Zoledronic Acid Novaplus, Zometa There may be other brand names for this medicine. When This Medicine Should Not Be Used: This medicine is not right for everyone. You should not receive it if you had an allergic reaction to zoledronic acid, or if you are pregnant. How to Use This Medicine:  
Injectable · A nurse or other health provider will give you this medicine. · Your doctor will prescribe your dose and schedule. This medicine is given through a needle placed in a vein. · Your doctor may tell you to drink extra liquids before your treatment to prevent kidney problems. · Your doctor may also give you vitamin D and calcium supplements. Tell your doctor if you are not able to take these medicines. · This medicine should come with a Medication Guide. Ask your pharmacist for a copy if you do not have one. · Missed dose: You must use this medicine on a fixed schedule. Call your doctor or pharmacist if you miss a dose. Drugs and Foods to Avoid: Ask your doctor or pharmacist before using any other medicine, including over-the-counter medicines, vitamins, and herbal products. · Do not use other medicines that also contain zoledronic acid. Do not use zoledronic acid together with another bisphosphonate medicine. · Some foods and medicines can affect how zoledronic acid works. Tell your doctor if you are using digoxin, antibiotics, diuretics (water pills), an NSAID pain or arthritis medicine (such as aspirin, celecoxib, ibuprofen, naproxen), steroid medicines, or cancer medicines. Warnings While Using This Medicine: · It is not safe to take this medicine during pregnancy. It could harm an unborn baby. Tell your doctor right away if you become pregnant. · Tell your doctor if you are breastfeeding, or if you have kidney disease, anemia, aspirin-sensitive asthma, bleeding problems, cancer, congestive heart failure, low blood calcium levels, stomach absorption problems, mineral imbalance, dental problems or gum disease. Also tell your doctor if you had surgery on your bowel or parathyroid or thyroid gland. · This medicine may cause the following problems: ¨ Jaw or teeth problems ¨ Severe bone, joint, or muscle pain ¨ Increased risk of thigh bone fracture ¨ Low calcium levels in your blood · You must have regular dental exams while you are being treated with this medicine. Tell your dentist or oral surgeon that you are using this medicine. · Your doctor will do lab tests at regular visits to check on the effects of this medicine. Keep all appointments. Possible Side Effects While Using This Medicine:  
Call your doctor right away if you notice any of these side effects: · Allergic reaction: Itching or hives, swelling in your face or hands, swelling or tingling in your mouth or throat, chest tightness, trouble breathing · Chest pain, trouble breathing, fast or uneven heartbeat · Decrease in how much or how often you urinate, blood in the urine, lower back or side pain, burning or painful urination · Muscle spasm or twitching, or numbness or tingling in your fingers, feet, or around your mouth · Pain, swelling, or numbness in the mouth or jaw, loose teeth or other teeth problems · Severe muscle, bone, or joint pain · Unusual pain in your thigh, groin, or hip If you notice these less serious side effects, talk with your doctor: · Fever, chills, cough, sore throat, and body aches · Headache · Mild nausea, constipation, diarrhea, stomach pain or upset · Redness, pain, or swelling of your skin where the needle is placed If you notice other side effects that you think are caused by this medicine, tell your doctor. Call your doctor for medical advice about side effects. You may report side effects to FDA at 7-808-FDA-5471 © 2017 [address] Information is for End User's use only and may not be sold, redistributed or otherwise used for commercial purposes. The above information is an  only. It is not intended as medical advice for individual conditions or treatments. Talk to your doctor, nurse or pharmacist before following any medical regimen to see if it is safe and effective for you.

## 2021-01-13 NOTE — PROGRESS NOTES
Patient with history of hemoptysis. Her chest x-ray was within normal limits. Chest CT revealing bilateral nodules. Patient has a history of exposure to passive smoke for about 20 years. She currently is asymptomatic. She does not have any fevers night sweats or weight loss. Would like for her to see pulmonary to further evaluate nodules. Diagnoses and all orders for this visit:    1. Age-related osteoporosis without current pathological fracture  Chest x-ray incidental finding of L1 compression fracture. Patient reports she cannot take Fosamax due to esophageal issues. She was seen by endocrinology and offered Reclast however deferred at the time. Information given to patient with regards to her request to reconsider. She will follow-up with endocrinology if interested    Back pain  She will follow-up with her pain specialist with regards to her back pains    2. History of pneumonia  History of spontaneous hemoptysis  Treated for pneumonia  Chest x-ray within normal limits  Lungs are clear  -     XR CHEST PA LAT; Future    3. Essential hypertension  Not controlled in the office  Requested patient to check her blood pressure and if consistently greater than 140/90 will initiate Norvasc and let me know  We will need to have her have a follow-up if taking medication  -     amLODIPine (NORVASC) 2.5 mg tablet; Take 1 Tab by mouth daily. 4. Elevated glucose  Well controlled at 4.9  Continue heart healthy diabetic type diet  -     AMB POC HEMOGLOBIN A1C        Chief Complaint   Patient presents with    Follow-up     No concerns      Pt with follow up for pneumonia. History of pneumonia  Coughed up blood dec 20, 2020  She was just talking to     She went to [facility] and diagnosed with pneumonia  She reports working out in the yard in the fall. She notes she has allergies.   Pt has no hx of smoking but [facility] smoke with [facility]   No fevers, night sweats, intentional weight loss of 5 pounds in 3 weeks      Blood pressure without history of hypertension  Patient reports that she has a blood pressure cuff at home. She has a wrist cuff and is well as an arm cuff  She does not have any chest pain or shortness of breath. She walks without difficulty. Osteoporosis  Patient notes that she was offered Reclast as well as Fosamax. She cannot take Fosamax due to esophageal issues. She has not seen endocrinology since last visit. She has not had any falls. Elevated glucose  Patient reports she has been eating a little bit more sweets over the holidays          Past Medical History:   Diagnosis Date    Fracture of left foot 06/2018    Glaucoma     Dr. Karoline Jarquin    Pap smear for cervical cancer screening     2009 neg 2010 neg 2012 neg    Right shoulder pain      Past Surgical History:   Procedure Laterality Date    HX BREAST BIOPSY Left yrs ago    neg    HX ORTHOPAEDIC Right 2019    Bone Spur     HX OTHER SURGICAL  2020    Sciatic injection     HX TONSILLECTOMY       Social History     Socioeconomic History    Marital status:      Spouse name: Not on file    Number of children: Not on file    Years of education: Not on file    Highest education level: Not on file   Tobacco Use    Smoking status: Never Smoker    Smokeless tobacco: Never Used   Substance and Sexual Activity    Alcohol use:  Yes     Alcohol/week: 1.0 standard drinks     Types: 1 Glasses of wine per week     Frequency: 2-3 times a week     Comment: rarely    Drug use: No    Sexual activity: Yes     Partners: Male     Birth control/protection: None   Social History Narrative    Retired from [employer]         to  healthy    2 children daughters : healthy    2 grandson's : healthy     Family History   Problem Relation Age of Onset    No Known Problems Mother      Current Outpatient Medications   Medication Sig Dispense Refill    calcium-cholecalciferol, D3, (CALTRATE 600+D) tablet Take 1 Tab by mouth daily.  dorzolamide-timolol (COSOPT) 22.3-6.8 mg/mL ophthalmic solution Administer 1 Drop to both eyes two (2) times a day.  latanoprost (XALATAN) 0.005 % ophthalmic solution Administer 1 Drop to both eyes daily.  cholecalciferol, vitamin D3, (Vitamin D3) 50 mcg (2,000 unit) tab Take  by mouth.  cyclobenzaprine (FLEXERIL) 5 mg tablet Take 1 Tab by mouth daily as needed for Muscle Spasm(s). May take total of 2 tabs per day (Patient taking differently: Take 5 mg by mouth daily as needed for Muscle Spasm(s). May take total of 2 tabs per day  Indications: NOT TAKING) 20 Tab 0    varicella-zoster recombinant, PF, (Shingrix, PF,) 50 mcg/0.5 mL susr injection 0.5mL by IntraMUSCular route once now and then repeat in 2-6 months (Patient taking differently: 0.5mL by IntraMUSCular route once now and then repeat in 2-6 months  Indications: NOT TAKING) 0.5 mL 1    estradioL (ESTRACE) 0.01 % (0.1 mg/gram) vaginal cream Insert 1 g into vagina two (2) days a week. 1 Tube 11    azelastine (ASTEPRO) 0.15 % (205.5 mcg) two (2) times a day. Indications: NOT TAKING      calcium carbonate (OS-FLY) 500 mg calcium (1,250 mg) tablet Take  by mouth daily.  Indications: NOT TAKING       No Known Allergies    Review of Systems - General ROS: negative for - chills or fever  Cardiovascular ROS: no chest pain or dyspnea on exertion  Respiratory ROS: no cough, shortness of breath, or wheezing    Visit Vitals  BP (!) 162/74 (BP 1 Location: Left arm, BP Patient Position: Sitting)   Pulse 65   Temp 98.2 °F (36.8 °C) (Oral)   Resp 16   Ht 5' 5\" (1.651 m)   Wt 147 lb (66.7 kg)   SpO2 98%   BMI 24.46 kg/m²           Constitutional: [x] Appears well-developed and well-nourished [x] No apparent distress      [] Abnormal -     Mental status: [x] Alert and awake  [x] Oriented to person/place/time [x] Able to follow commands    [] Abnormal -     Eyes:   EOM    [x]  Normal    [] Abnormal -   Sclera  [x]  Normal    [] Abnormal -          Discharge [x]  None visible   [] Abnormal -     HENT: [x] Normocephalic, atraumatic  [] Abnormal -   [x] Mouth/Throat: Mucous membranes are moist    External Ears [x] Normal  [] Abnormal -    Neck: [x] No visualized mass [] Abnormal -     Pulmonary/Chest: [x] Respiratory effort normal   [x] No visualized signs of difficulty breathing or respiratory distress        [] Abnormal -      Musculoskeletal:   [x] Normal gait with no signs of ataxia         [x] Normal range of motion of neck        [] Abnormal -     Neurological:        [x] No Facial Asymmetry (Cranial nerve 7 motor function) (limited exam due to video visit)          [x] No gaze palsy        [] Abnormal -          Skin:        [x] No significant exanthematous lesions or discoloration noted on facial skin         [] Abnormal -            Psychiatric:       [x] Normal Affect [] Abnormal -        [x] No Hallucinations            ATTENTION:   This medical record was transcribed using an electronic medical records/speech recognition system. Although proofread, it may and can contain electronic, spelling and other errors. Corrections may be executed at a later time. Please feel free to contact us for any clarifications as needed.

## 2021-01-16 DIAGNOSIS — R04.2 HEMOPTYSIS: Primary | ICD-10-CM

## 2021-01-16 NOTE — PROGRESS NOTES
Please let patient know her chest x-ray was normal which is good. Given her history I ordered a chest CT with contrast.   may be reaching out to call her for an appointment.

## 2021-01-18 ENCOUNTER — TRANSCRIBE ORDER (OUTPATIENT)
Dept: SCHEDULING | Age: 81
End: 2021-01-18

## 2021-01-18 ENCOUNTER — TELEPHONE (OUTPATIENT)
Dept: INTERNAL MEDICINE CLINIC | Age: 81
End: 2021-01-18

## 2021-01-18 DIAGNOSIS — R04.2 HEMOPTYSIS: Primary | ICD-10-CM

## 2021-01-18 NOTE — TELEPHONE ENCOUNTER
----- Message from Samara Miranda MD sent at 1/16/2021  3:08 PM EST -----  Please let patient know her chest x-ray was normal which is good. Given her history I ordered a chest CT with contrast.   may be reaching out to call her for an appointment.

## 2021-01-18 NOTE — TELEPHONE ENCOUNTER
Spoke to pt. She has been advised of results and recommendations. She voices understanding and thanks.

## 2021-01-22 ENCOUNTER — HOSPITAL ENCOUNTER (OUTPATIENT)
Dept: CT IMAGING | Age: 81
Discharge: HOME | End: 2021-01-22
Attending: INTERNAL MEDICINE
Payer: MEDICARE

## 2021-01-22 DIAGNOSIS — R04.2 HEMOPTYSIS: ICD-10-CM

## 2021-01-22 LAB — CREAT BLD-MCNC: 0.7 MG/DL (ref 0.6–1.3)

## 2021-01-22 PROCEDURE — 82565 ASSAY OF CREATININE: CPT

## 2021-01-22 PROCEDURE — 74011000636 HC RX REV CODE- 636: Performed by: INTERNAL MEDICINE

## 2021-01-22 PROCEDURE — 71260 CT THORAX DX C+: CPT

## 2021-01-22 RX ADMIN — IOPAMIDOL 100 ML: 612 INJECTION, SOLUTION INTRAVENOUS at 16:43

## 2021-01-24 DIAGNOSIS — R91.8 PULMONARY NODULES: Primary | ICD-10-CM

## 2021-02-01 ENCOUNTER — TRANSCRIBE ORDER (OUTPATIENT)
Dept: SCHEDULING | Age: 81
End: 2021-02-01

## 2021-02-01 DIAGNOSIS — M48.54XA COLLAPSE OF THORACIC VERTEBRA (HCC): Primary | ICD-10-CM

## 2021-02-05 ENCOUNTER — HOSPITAL ENCOUNTER (OUTPATIENT)
Dept: MRI IMAGING | Age: 81
Discharge: HOME | End: 2021-02-05
Attending: ANESTHESIOLOGY
Payer: MEDICARE

## 2021-02-05 DIAGNOSIS — M48.54XA COLLAPSE OF THORACIC VERTEBRA (HCC): ICD-10-CM

## 2021-02-05 PROCEDURE — 72146 MRI CHEST SPINE W/O DYE: CPT

## 2021-02-17 ENCOUNTER — TRANSCRIBE ORDER (OUTPATIENT)
Dept: SCHEDULING | Age: 81
End: 2021-02-17

## 2021-02-17 DIAGNOSIS — R91.8 MULTIPLE LUNG NODULES ON CT: Primary | ICD-10-CM

## 2021-03-31 ENCOUNTER — HOSPITAL ENCOUNTER (OUTPATIENT)
Dept: CT IMAGING | Age: 81
Discharge: HOME | End: 2021-03-31
Attending: INTERNAL MEDICINE
Payer: MEDICARE

## 2021-03-31 DIAGNOSIS — R91.8 MULTIPLE LUNG NODULES ON CT: ICD-10-CM

## 2021-03-31 PROCEDURE — 71250 CT THORAX DX C-: CPT

## 2021-04-15 RX ORDER — SPIRONOLACTONE 25 MG
TABLET ORAL
COMMUNITY

## 2021-04-15 NOTE — PROGRESS NOTES
[address]  Endoscopy Preprocedure Instructions      1. On the day of your surgery, please report to registration located on the 2nd floor of the  [facility]. yes    2. You must have a responsible adult to drive you to the hospital, stay at the hospital during your procedure and drive you home. If they leave your procedure will not be started (no exceptions). yes    3. Do not have anything to eat or drink (including water, gum, mints, coffee, and juice) after midnight. This does not apply to the medications you were instructed to take by your physician. yesIf you are currently taking Plavix, Coumadin, Aspirin, or other blood-thinning agents, contact your physician for special instructions. yes    4. Bronchoscopy     5. Have a list of all current medications, including vitamins, herbal supplements and any other over the counter medications. yes    6. If you wear glasses, contacts, dentures and/or hearing aids, they may be removed prior to procedure, please bring a case to store them in. yes    7. You should understand that if you do not follow these instructions your procedure may be cancelled. If your physical condition changes (difficult breathing, I.e. fever, cold or flu) please contact your doctor as soon as possible. 8. It is important that you be on time.   If for any reason you are unable to keep your appointment please call your physicians office prior to your scheduled procedure

## 2021-04-19 ENCOUNTER — HOSPITAL ENCOUNTER (OUTPATIENT)
Dept: LAB | Age: 81
Discharge: HOME | End: 2021-04-19
Payer: MEDICARE

## 2021-04-19 ENCOUNTER — TRANSCRIBE ORDER (OUTPATIENT)
Dept: REGISTRATION | Age: 81
End: 2021-04-19

## 2021-04-19 DIAGNOSIS — Z01.812 ENCOUNTER FOR PREOPERATIVE SCREENING LABORATORY TESTING FOR COVID-19 VIRUS: ICD-10-CM

## 2021-04-19 DIAGNOSIS — Z20.822 ENCOUNTER FOR PREOPERATIVE SCREENING LABORATORY TESTING FOR COVID-19 VIRUS: Primary | ICD-10-CM

## 2021-04-19 DIAGNOSIS — Z20.822 ENCOUNTER FOR PREOPERATIVE SCREENING LABORATORY TESTING FOR COVID-19 VIRUS: ICD-10-CM

## 2021-04-19 DIAGNOSIS — Z01.812 ENCOUNTER FOR PREOPERATIVE SCREENING LABORATORY TESTING FOR COVID-19 VIRUS: Primary | ICD-10-CM

## 2021-04-19 PROCEDURE — U0005 INFEC AGEN DETEC AMPLI PROBE: HCPCS

## 2021-04-20 LAB — SARS-COV-2, COV2NT: NOT DETECTED

## 2021-04-22 ENCOUNTER — HOSPITAL ENCOUNTER (OUTPATIENT)
Age: 81
Setting detail: OUTPATIENT SURGERY
Discharge: HOME | End: 2021-04-22
Attending: INTERNAL MEDICINE | Admitting: INTERNAL MEDICINE
Payer: MEDICARE

## 2021-04-22 VITALS
WEIGHT: 147.93 LBS | HEART RATE: 83 BPM | SYSTOLIC BLOOD PRESSURE: 135 MMHG | RESPIRATION RATE: 20 BRPM | BODY MASS INDEX: 24.65 KG/M2 | OXYGEN SATURATION: 99 % | DIASTOLIC BLOOD PRESSURE: 84 MMHG | HEIGHT: 65 IN | TEMPERATURE: 97.8 F

## 2021-04-22 LAB
APPEARANCE FLD: ABNORMAL
COLOR FLD: COLORLESS
LYMPHOCYTES NFR FLD: 18 %
MONOS+MACROS NFR FLD: 11 %
NEUTROPHILS NFR FLD: 71 %
NUC CELL # FLD: 328 /CU MM
RBC # FLD: >100 /CU MM
SPECIMEN SOURCE FLD: ABNORMAL

## 2021-04-22 PROCEDURE — 87252 VIRUS INOCULATION TISSUE: CPT

## 2021-04-22 PROCEDURE — 87149 DNA/RNA DIRECT PROBE: CPT

## 2021-04-22 PROCEDURE — 87070 CULTURE OTHR SPECIMN AEROBIC: CPT

## 2021-04-22 PROCEDURE — 74011250636 HC RX REV CODE- 250/636: Performed by: INTERNAL MEDICINE

## 2021-04-22 PROCEDURE — 87206 SMEAR FLUORESCENT/ACID STAI: CPT

## 2021-04-22 PROCEDURE — 77030022556 HC FCPS BIOP TIS ENDOSC BSC -B: Performed by: INTERNAL MEDICINE

## 2021-04-22 PROCEDURE — 87186 SC STD MICRODIL/AGAR DIL: CPT

## 2021-04-22 PROCEDURE — 77030003657 HC NDL SCLER BSC -B: Performed by: INTERNAL MEDICINE

## 2021-04-22 PROCEDURE — 87102 FUNGUS ISOLATION CULTURE: CPT

## 2021-04-22 PROCEDURE — 76040000019: Performed by: INTERNAL MEDICINE

## 2021-04-22 PROCEDURE — 2709999900 HC NON-CHARGEABLE SUPPLY: Performed by: INTERNAL MEDICINE

## 2021-04-22 PROCEDURE — 87280 RESPIRATORY SYNCYTIAL AG IF: CPT

## 2021-04-22 PROCEDURE — 87278 LEGION PNEUMOPHILIA AG IF: CPT

## 2021-04-22 PROCEDURE — 88112 CYTOPATH CELL ENHANCE TECH: CPT

## 2021-04-22 PROCEDURE — 89050 BODY FLUID CELL COUNT: CPT

## 2021-04-22 PROCEDURE — 74011000250 HC RX REV CODE- 250: Performed by: INTERNAL MEDICINE

## 2021-04-22 RX ORDER — SODIUM CHLORIDE 9 MG/ML
50 INJECTION, SOLUTION INTRAVENOUS CONTINUOUS
Status: DISCONTINUED | OUTPATIENT
Start: 2021-04-22 | End: 2021-04-22 | Stop reason: HOSPADM

## 2021-04-22 RX ORDER — ACETYLCYSTEINE 200 MG/ML
4 SOLUTION ORAL; RESPIRATORY (INHALATION) ONCE
Status: DISCONTINUED | OUTPATIENT
Start: 2021-04-22 | End: 2021-04-22 | Stop reason: HOSPADM

## 2021-04-22 RX ORDER — LIDOCAINE HYDROCHLORIDE 20 MG/ML
2 INJECTION, SOLUTION INFILTRATION; PERINEURAL
Status: DISCONTINUED | OUTPATIENT
Start: 2021-04-22 | End: 2021-04-22 | Stop reason: HOSPADM

## 2021-04-22 RX ORDER — MIDAZOLAM HYDROCHLORIDE 1 MG/ML
.25-5 INJECTION, SOLUTION INTRAMUSCULAR; INTRAVENOUS
Status: DISCONTINUED | OUTPATIENT
Start: 2021-04-22 | End: 2021-04-22 | Stop reason: HOSPADM

## 2021-04-22 RX ORDER — LIDOCAINE HYDROCHLORIDE 40 MG/ML
SOLUTION TOPICAL ONCE
Status: COMPLETED | OUTPATIENT
Start: 2021-04-22 | End: 2021-04-22

## 2021-04-22 RX ORDER — FENTANYL CITRATE 50 UG/ML
25 INJECTION, SOLUTION INTRAMUSCULAR; INTRAVENOUS
Status: DISCONTINUED | OUTPATIENT
Start: 2021-04-22 | End: 2021-04-22 | Stop reason: HOSPADM

## 2021-04-22 RX ORDER — LIDOCAINE HYDROCHLORIDE AND EPINEPHRINE 20; 10 MG/ML; UG/ML
2 INJECTION, SOLUTION INFILTRATION; PERINEURAL AS NEEDED
Status: DISCONTINUED | OUTPATIENT
Start: 2021-04-22 | End: 2021-04-22 | Stop reason: HOSPADM

## 2021-04-22 RX ORDER — NALOXONE HYDROCHLORIDE 0.4 MG/ML
0.2 INJECTION, SOLUTION INTRAMUSCULAR; INTRAVENOUS; SUBCUTANEOUS
Status: DISCONTINUED | OUTPATIENT
Start: 2021-04-22 | End: 2021-04-22 | Stop reason: HOSPADM

## 2021-04-22 RX ORDER — FLUMAZENIL 0.1 MG/ML
0.2 INJECTION INTRAVENOUS
Status: DISCONTINUED | OUTPATIENT
Start: 2021-04-22 | End: 2021-04-22 | Stop reason: HOSPADM

## 2021-04-22 RX ADMIN — SODIUM CHLORIDE 50 ML/HR: 9 INJECTION, SOLUTION INTRAVENOUS at 07:13

## 2021-04-22 RX ADMIN — FENTANYL CITRATE 50 MCG: 50 INJECTION, SOLUTION INTRAMUSCULAR; INTRAVENOUS at 07:45

## 2021-04-22 RX ADMIN — FENTANYL CITRATE 50 MCG: 50 INJECTION, SOLUTION INTRAMUSCULAR; INTRAVENOUS at 07:41

## 2021-04-22 RX ADMIN — LIDOCAINE HYDROCHLORIDE 10 ML: 40 SOLUTION TOPICAL at 07:13

## 2021-04-22 RX ADMIN — MIDAZOLAM 2 MG: 1 INJECTION INTRAMUSCULAR; INTRAVENOUS at 07:41

## 2021-04-22 RX ADMIN — MIDAZOLAM 2 MG: 1 INJECTION INTRAMUSCULAR; INTRAVENOUS at 07:44

## 2021-04-22 NOTE — DISCHARGE INSTRUCTIONS
BRONCHOSCOPY / EUS / EBUS DISCHARGE INSTRUCTIONS  Discomfort:  Sore throat- throat lozenges or warm salt water gargle  redness at IV site- apply warm compress to area; if redness or soreness persist- contact your physician  Gaseous discomfort- walking, belching will help relieve any discomfort  You may not operate a vehicle for 12 hours  You may not in an occupation involving machinery or appliances for rest of today  You may not drink alcoholic beverages for at least 12 hours  Avoid making any critical decisions for at least 24 hour  Blood tinged secretions - this should stop within 2-3 hours  DIET  Nothing by mouth- do not eat or drink for two hours. You may eat and drink after 10:00 am              You may resume your regular diet - however -  remember your colon is empty and a heavy meal will produce gas. Avoid these foods:  vegetables, fried / greasy foods, carbonated drinks  ACTIVITY  You may resume your normal daily activities however it is recommended that you spend the remainder of the day resting -  avoid any strenuous activity. CALL M.D.   ANY SIGN OF                                         Increasing pain, nausea, vomiting  Abdominal distension (swelling)  New increased bleeding (oral or rectal)  Fever (chills)  Pain in chest area  Bloody discharge from nose or mouth  Shortness of breath     Call physicians office for following  Follow up with Dr Andrea Benoit

## 2021-04-22 NOTE — PROGRESS NOTES
Ayush Couch  1940  [id number]    Situation:  Verbal report received from: Krissy Goldman RN  Procedure: Procedure(s):  BRONCHOSCOPY  INJECTION  BRONCHIAL ALVEOLAR LAVAGE    Background:    Preoperative diagnosis: LUNG NODULES  Postoperative diagnosis: Abnormal CT    :  Dr. Elizabet Jacobson  Assistant(s): Endoscopy Technician-1: Beatriz Byrne  Endoscopy RN-1: Myesha Koenig RN    Specimens: * No specimens in log *  H. Pylori  no    Assessment:  Intra-procedure medications   Versed 4 mg  Fentanyl **yes*100 mcg      Intravenous fluids: NS@ KVO     Vital signs stable yes    Abdominal assessment: round and soft yes    Recommendation:  Discharge patient per MD order yes.   Family or Friend yes   Permission to share finding with family or friend yes

## 2021-04-22 NOTE — PERIOP NOTES
Computers and internet went down during procedure, all VS did not cross over. PT VSS throughout procedure.

## 2021-04-22 NOTE — PROGRESS NOTES
The documentation for this period is being entered following the guidelines as defined in the [facility] policy by Trev Billingsley RN.

## 2021-04-22 NOTE — DISCHARGE SUMMARY
Joce Le  [id number]  1940      BRONCHOSCOPY / EUS / EBUS DISCHARGE INSTRUCTIONS  Discomfort:  Sore throat- throat lozenges or warm salt water gargle  redness at IV site- apply warm compress to area; if redness or soreness persist- contact your physician  Gaseous discomfort- walking, belching will help relieve any discomfort  You may not operate a vehicle for 12 hours  You may not in an occupation involving machinery or appliances for rest of today  You may not drink alcoholic beverages for at least 12 hours  Avoid making any critical decisions for at least 24 hour  Blood tinged secretions  this should stop within 2-3 hours  DIET  Nothing by mouth- do not eat or drink for two hours. You may eat and drink after 10:00 am   You may resume your regular diet  however -  remember your colon is empty and a heavy meal will produce gas. Avoid these foods:  vegetables, fried / greasy foods, carbonated drinks  ACTIVITY  You may resume your normal daily activities however it is recommended that you spend the remainder of the day resting -  avoid any strenuous activity. CALL M.D.   ANY SIGN OF   Increasing pain, nausea, vomiting  Abdominal distension (swelling)  New increased bleeding (oral or rectal)  Fever (chills)  Pain in chest area  Bloody discharge from nose or mouth  Shortness of breath    Call physicians office for following  Follow up with Dr Silvio Zurita

## 2021-04-22 NOTE — PROGRESS NOTES
Endoscopy discharge instructions have been reviewed and given to patient and spouse. The patient and spouse verbalized understanding and acceptance of instructions. Dr. Denzel Reddy discussed with patient and spouse procedure findings and next steps.

## 2021-04-23 LAB
L PNEUMO AG SPEC QL IF: NEGATIVE
RSV AG SPEC QL IF: NEGATIVE
SPECIMEN SOURCE: NORMAL
SPECIMEN SOURCE: NORMAL

## 2021-04-24 LAB
BACTERIA SPEC CULT: NORMAL
BACTERIA SPEC CULT: NORMAL
GRAM STN SPEC: NORMAL
SERVICE CMNT-IMP: NORMAL
SERVICE CMNT-IMP: NORMAL

## 2021-04-25 NOTE — PROCEDURES
[address]  [address]  [address] [facility] [address]  [address]  [phone]    Vivian Kim  1940  [id number]      Date of Procedure: 4/22/2021    Preoperative diagnosis: LUNG NODULES    Procedure: Procedure(s):  BRONCHOSCOPY  INJECTION  BRONCHIAL ALVEOLAR LAVAGE    Indication: abnormal CT chest    :  Jose Alberto Mckenzie MD    Assistant(s): Endoscopy Technician-1: Bartolome Aponte  Endoscopy RN-1: Prashant Rodriguez RN    Anesthesia/Sedation:  Versed 2 mg IV and Fentanyl 100 mcg IV      Procedure Details:  After infomed consent was obtained for the procedure, with all risks and benefits of procedure explained the patient was taken to the endoscopy suite and placed in the supine position. Following sequential administration of sedation as per above, the bronchoscope was inserted into the mouth and advanced under direct vision to the tracheobronchial tree. Findings:   Vocal cords wnl. Trachea with scant sputum. RUL with some scant evidence of yellow sputum. Evidence of old blood in the RML. BAL done in RML- 30 cc inserted, 15 cc of fluid with debris  BAL done of Lingula- 30 cc inserted, 10 cc return of opaque fluid      Therapies:   None    Specimens:   See above           Complications:   None noted; patient tolerated the procedure well. EBL:  Minimal           Impression:    Abnormal CT      Recommendations:   Follow up cultures      Vahe Cowart MD  4/24/2021  9:16 PM

## 2021-04-25 NOTE — H&P
History and Physical      69 yo presenting for bronch due to abnormal CT chest and hemoptysis. Since last visit has had one episode of hemoptysis. Gen: awake, alert, in no distress  Lungs: CTA, no w/r/r  CV: RRR, no m/g/r  Abd: soft/nt/nd  LE: no edema    A/P: Will proceed with bronchoscopy as planned.

## 2021-04-30 LAB
LEGIONELLA SPEC CULT: NORMAL
LEGIONELLA SPEC CULT: NORMAL
SPECIMEN SOURCE: NORMAL

## 2021-05-03 LAB
SPECIMEN SOURCE: NORMAL
VIRUS SPEC CULT: NORMAL

## 2021-05-04 LAB
ACID FAST STN SPEC: POSITIVE
MYCOBACTERIUM SPEC QL CULT: POSITIVE
SPECIMEN PREPARATION: ABNORMAL
SPECIMEN SOURCE: ABNORMAL

## 2021-05-05 LAB
M AVIUM CMPLX RRNA SPEC QL PROBE: POSITIVE
M GORDONAE RRNA SPEC QL PROBE: ABNORMAL
M KANSASII RRNA SPEC QL PROBE: ABNORMAL
M TB CMPLX RRNA SPEC QL PROBE: NEGATIVE
OTHER, RAFBI6: ABNORMAL
SPECIMEN SOURCE: ABNORMAL
SUSCEPT TESTING, RAFBI7: ABNORMAL

## 2021-05-07 LAB
ACID FAST STN SPEC: POSITIVE
AMIKACIN ISLT MIC: ABNORMAL
CIPROFLOXACIN ISLT MIC: ABNORMAL
CLARITHRO ISLT MIC: ABNORMAL
ETHAMBUTOL ISLT MIC: ABNORMAL
LINEZOLID ISLT MIC: ABNORMAL
M AVIUM CMPLX RRNA SPEC QL PROBE: POSITIVE
M GORDONAE RRNA SPEC QL PROBE: ABNORMAL
M KANSASII RRNA SPEC QL PROBE: ABNORMAL
M TB CMPLX RRNA SPEC QL PROBE: NEGATIVE
MICROORGANISM/AGENT SPEC: ABNORMAL
MOXIFLOXACIN ISLT MIC: ABNORMAL
MYCOBACTERIUM SPEC QL CULT: POSITIVE
OTHER, RAFBI6: ABNORMAL
PLEASE NOTE, AFR16: ABNORMAL
RIFAMPIN ISLT MIC: ABNORMAL
SPECIMEN PREPARATION: ABNORMAL
SPECIMEN SOURCE: ABNORMAL
STREPTOMYCIN ISLT MIC: ABNORMAL
SUSCEPT TESTING, RAFBI7: ABNORMAL

## 2021-05-12 ENCOUNTER — TRANSCRIBE ORDER (OUTPATIENT)
Dept: SCHEDULING | Age: 81
End: 2021-05-12

## 2021-05-12 DIAGNOSIS — R91.8 MULTIPLE LUNG NODULES ON CT: Primary | ICD-10-CM

## 2021-05-24 LAB
BACTERIA SPEC CULT: NORMAL
SERVICE CMNT-IMP: NORMAL

## 2021-06-07 ENCOUNTER — PATIENT MESSAGE (OUTPATIENT)
Dept: INTERNAL MEDICINE CLINIC | Age: 81
End: 2021-06-07

## 2021-06-07 NOTE — TELEPHONE ENCOUNTER
From: Joce Le  To: Bianka Joyner MD  Sent: 6/7/2021 12:14 PM EDT  Subject: Non-Urgent Medical Question    I would like to discuss with Dr. Ned Mendoza (during my appointment on Friday, June 11th), the test results of my Bronchoscopy performed by Dr. Natalie Banegas on April 22, 2021. Would just like to have Dr. Kera Santos thoughts on this diagnosis which will in all likelihood be an ongoing condition for me.

## 2021-06-07 NOTE — LETTER
6/7/2021 2:49 PM    MsCelia Roibn Dank [address] [place] [address]        To whom it may concern:    Please send the most recent medical records for Zoraida Cochran, date of birth 1940 for continuity of care.     Please fax to [phone]        Sincerely,      Clinton Lee MD

## 2021-06-11 ENCOUNTER — OFFICE VISIT (OUTPATIENT)
Dept: INTERNAL MEDICINE CLINIC | Age: 81
End: 2021-06-11
Payer: MEDICARE

## 2021-06-11 VITALS
SYSTOLIC BLOOD PRESSURE: 170 MMHG | RESPIRATION RATE: 16 BRPM | BODY MASS INDEX: 24.76 KG/M2 | HEIGHT: 65 IN | TEMPERATURE: 97.8 F | DIASTOLIC BLOOD PRESSURE: 90 MMHG | OXYGEN SATURATION: 96 % | HEART RATE: 69 BPM | WEIGHT: 148.6 LBS

## 2021-06-11 DIAGNOSIS — R73.09 ELEVATED GLUCOSE: ICD-10-CM

## 2021-06-11 DIAGNOSIS — R03.0 ELEVATED BLOOD PRESSURE READING IN OFFICE WITHOUT DIAGNOSIS OF HYPERTENSION: Primary | ICD-10-CM

## 2021-06-11 DIAGNOSIS — R68.2 DRY MOUTH: ICD-10-CM

## 2021-06-11 DIAGNOSIS — M54.6 CHRONIC MIDLINE THORACIC BACK PAIN: ICD-10-CM

## 2021-06-11 DIAGNOSIS — G89.29 CHRONIC MIDLINE THORACIC BACK PAIN: ICD-10-CM

## 2021-06-11 DIAGNOSIS — Z11.59 ENCOUNTER FOR HEPATITIS C SCREENING TEST FOR LOW RISK PATIENT: ICD-10-CM

## 2021-06-11 DIAGNOSIS — A31.0 MYCOBACTERIUM AVIUM INFECTION (HCC): ICD-10-CM

## 2021-06-11 PROCEDURE — 99213 OFFICE O/P EST LOW 20 MIN: CPT | Performed by: INTERNAL MEDICINE

## 2021-06-11 PROCEDURE — G8399 PT W/DXA RESULTS DOCUMENT: HCPCS | Performed by: INTERNAL MEDICINE

## 2021-06-11 PROCEDURE — G8536 NO DOC ELDER MAL SCRN: HCPCS | Performed by: INTERNAL MEDICINE

## 2021-06-11 PROCEDURE — G8427 DOCREV CUR MEDS BY ELIG CLIN: HCPCS | Performed by: INTERNAL MEDICINE

## 2021-06-11 PROCEDURE — G8420 CALC BMI NORM PARAMETERS: HCPCS | Performed by: INTERNAL MEDICINE

## 2021-06-11 PROCEDURE — G8753 SYS BP > OR = 140: HCPCS | Performed by: INTERNAL MEDICINE

## 2021-06-11 PROCEDURE — G8510 SCR DEP NEG, NO PLAN REQD: HCPCS | Performed by: INTERNAL MEDICINE

## 2021-06-11 PROCEDURE — G8755 DIAS BP > OR = 90: HCPCS | Performed by: INTERNAL MEDICINE

## 2021-06-11 PROCEDURE — 1090F PRES/ABSN URINE INCON ASSESS: CPT | Performed by: INTERNAL MEDICINE

## 2021-06-11 PROCEDURE — 1101F PT FALLS ASSESS-DOCD LE1/YR: CPT | Performed by: INTERNAL MEDICINE

## 2021-06-11 RX ORDER — METRONIDAZOLE 7.5 MG/G
CREAM TOPICAL 2 TIMES DAILY
COMMUNITY

## 2021-06-11 RX ORDER — AMLODIPINE BESYLATE 2.5 MG/1
2.5 TABLET ORAL DAILY
Qty: 30 TABLET | Refills: 0 | Status: SHIPPED | OUTPATIENT
Start: 2021-06-11 | End: 2021-08-09

## 2021-06-11 RX ORDER — TRIAMCINOLONE ACETONIDE 1 MG/G
CREAM TOPICAL
COMMUNITY
Start: 2021-03-11

## 2021-06-11 NOTE — PROGRESS NOTES
Diagnoses and all orders for this visit:    1. Elevated blood pressure reading in office without diagnosis of hypertension  Patient has a history of chronically elevated blood pressure in the office  Home readings are within normal limits  Continue blood pressure monitoring and if consistently greater than 140/80 at home she will start amlodipine and let me know  -     amLODIPine (NORVASC) 2.5 mg tablet; Take 1 Tablet by mouth daily. Indications: high blood pressure  -     METABOLIC PANEL, COMPREHENSIVE; Future    2. Elevated glucose  Monitoring  -     HEMOGLOBIN A1C WITH EAG; Future    3. Dry mouth    -     SJOGREN'S ABS, SSA AND SSB; Future    4. Encounter for hepatitis C screening test for low risk patient  -     HEPATITIS C AB; Future    5. Mycobacterium avium infection [facility])  Following with Dr. Mir Barker may initiate antibiotics in August and next appointment  Functionally doing very well: Energy level good, no shortness of breath or fatigue    6. Chronic midline thoracic back pain followed by Dr. Byron Little           Chief Complaint   Patient presents with    Follow-up     discuss regarding bronchoscopy findings, discuss regarding advanced directive     MAC  Patient reports that she was found to have infection in her lungs. She did have 1 more episode of hemoptysis but it resolved quickly. She has been following up with Dr. Mir Barker. She notes her energy level is good and no shortness of breath. She has not started any antibiotics. Advanced directives  Reviewed with me and if terminal or medical therapy fetal she prefers against intubation or compressions. She will fill out her form and send back to me      Defers colonscopy  Will think about mammogram    Blood pressure elevated without history of hypertension  Home blood pressure readings  124/76 -154/83  She does not have chest pain or shortness of breath. She notes it is consistently elevated in the office. Dry mouth  She is not on any medications. She does not think she is a mouth breather. Symptoms occurred over the past few months. Back pain  Chronic back pain but stable. She is followed by pain specialist.      Past Medical History:   Diagnosis Date    Fracture of left foot 06/2018    Glaucoma     Dr. Anastasiya Alves    Pap smear for cervical cancer screening     2009 neg 2010 neg 2012 neg    Right shoulder pain      Past Surgical History:   Procedure Laterality Date    HX BREAST BIOPSY Left yrs ago    neg    HX ORTHOPAEDIC Right 2019    Bone Spur     HX OTHER SURGICAL  2020    Sciatic injection     HX TONSILLECTOMY      IR BRONCHOSCOPY  04/2021     Social History     Socioeconomic History    Marital status:      Spouse name: Not on file    Number of children: Not on file    Years of education: Not on file    Highest education level: Not on file   Tobacco Use    Smoking status: Never Smoker    Smokeless tobacco: Never Used   Vaping Use    Vaping Use: Never used   Substance and Sexual Activity    Alcohol use: Yes     Alcohol/week: 3.0 standard drinks     Types: 3 Glasses of wine per week     Comment: rarely    Drug use: No    Sexual activity: Yes     Partners: Male     Birth control/protection: None   Social History Narrative    Retired from [employer]         to  healthy    2 children daughters : healthy    2 grandson's : healthy     Social Determinants of Health     Financial Resource Strain:     Difficulty of Paying Living Expenses:    Food Insecurity:     Worried About Running Out of Food in the Last Year:     [address] in the Last Year:    Transportation Needs:     Lack of Transportation (Medical):      Lack of Transportation (Non-Medical):    Physical Activity:     Days of Exercise per Week:     Minutes of Exercise per Session:    Stress:     Feeling of Stress :    Social Connections:     Frequency of Communication with Friends and Family:     Frequency of Social Gatherings with Friends and Family:     Attends [employer] Services:     Active Member of Clubs or Organizations:     Attends Club or Organization Meetings:     Marital Status:      Family History   Problem Relation Age of Onset    No Known Problems Mother      Current Outpatient Medications   Medication Sig Dispense Refill    metroNIDAZOLE (METROCREAM) 0.75 % topical cream Apply  to affected area two (2) times a day.  triamcinolone acetonide (KENALOG) 0.1 % topical cream       Calcium-Cholecalciferol, D3, (Calcium 600 with Vitamin D3) 600 mg(1,500mg) -400 unit chew Take  by mouth. Calcium 520mgs / Vitamin D3 50mcg 2 tablets a day      OTHER,NON-FORMULARY, Zinc 30 mg 1 tablet once a day.  dorzolamide-timolol (COSOPT) 22.3-6.8 mg/mL ophthalmic solution Administer 1 Drop to both eyes two (2) times a day.  latanoprost (XALATAN) 0.005 % ophthalmic solution Administer 1 Drop to both eyes daily.        No Known Allergies    Review of Systems - General ROS: negative for - chills, fatigue, fever, sleep disturbance or weight loss  Cardiovascular ROS: no chest pain or dyspnea on exertion  Respiratory ROS: no cough, shortness of breath, or wheezing    Visit Vitals  BP (!) 170/90 (BP 1 Location: Left upper arm, BP Patient Position: Sitting, BP Cuff Size: Adult)   Pulse 69   Temp 97.8 °F (36.6 °C) (Oral)   Resp 16   Ht 5' 5\" (1.651 m)   Wt 148 lb 9.6 oz (67.4 kg)   SpO2 96%   BMI 24.73 kg/m²     Constitutional: [x] Appears well-developed and well-nourished [x] No apparent distress      [] Abnormal -     Mental status: [x] Alert and awake  [x] Oriented to person/place/time [x] Able to follow commands    [] Abnormal -     Eyes:   EOM    [x]  Normal    [] Abnormal -   Sclera  [x]  Normal    [] Abnormal -          Discharge [x]  None visible   [] Abnormal -     HENT: [x] Normocephalic, atraumatic  [] Abnormal -   [x] Mouth/Throat: Mucous membranes are moist    External Ears [x] Normal  [] Abnormal -    Neck: [x] No visualized mass [] Abnormal -     Pulmonary/Chest: [x] Respiratory effort normal   [x] No visualized signs of difficulty breathing or respiratory distress        [] Abnormal -      Musculoskeletal:   [x] Normal gait with no signs of ataxia         [x] Normal range of motion of neck        [] Abnormal -     Neurological:        [x] No Facial Asymmetry (Cranial nerve 7 motor function) (limited exam due to video visit)          [x] No gaze palsy        [] Abnormal -          Skin:        [x] No significant exanthematous lesions or discoloration noted on facial skin         [] Abnormal -            Psychiatric:       [x] Normal Affect [] Abnormal -        [x] No Hallucinations      ATTENTION:   This medical record was transcribed using an electronic medical records/speech recognition system. Although proofread, it may and can contain electronic, spelling and other errors. Corrections may be executed at a later time. Please contact us for any clarifications as neededOn this date 06/11/21  I have spent  minutes reviewing previous notes, test results and face to face with the patient discussing the diagnosis and importance of compliance with the treatment plan as well as documenting on the day of the visit.

## 2021-06-11 NOTE — PATIENT INSTRUCTIONS
High Blood Pressure: Care Instructions Overview It's normal for blood pressure to go up and down throughout the day. But if it stays up, you have high blood pressure. Another name for high blood pressure is hypertension. Despite what a lot of people think, high blood pressure usually doesn't cause headaches or make you feel dizzy or lightheaded. It usually has no symptoms. But it does increase your risk of stroke, heart attack, and other problems. You and your doctor will talk about your risks of these problems based on your blood pressure. Your doctor will give you a goal for your blood pressure. Your goal will be based on your health and your age. Lifestyle changes, such as eating healthy and being active, are always important to help lower blood pressure. You might also take medicine to reach your blood pressure goal. 
Follow-up care is a key part of your treatment and safety. Be sure to make and go to all appointments, and call your doctor if you are having problems. It's also a good idea to know your test results and keep a list of the medicines you take. How can you care for yourself at home? Medical treatment · If you stop taking your medicine, your blood pressure will go back up. You may take one or more types of medicine to lower your blood pressure. Be safe with medicines. Take your medicine exactly as prescribed. Call your doctor if you think you are having a problem with your medicine. · Talk to your doctor before you start taking aspirin every day. Aspirin can help certain people lower their risk of a heart attack or stroke. But taking aspirin isn't right for everyone, because it can cause serious bleeding. · See your doctor regularly. You may need to see the doctor more often at first or until your blood pressure comes down. · If you are taking blood pressure medicine, talk to your doctor before you take decongestants or anti-inflammatory medicine, such as ibuprofen.  Some of these medicines can raise blood pressure. · Learn how to check your blood pressure at home. Lifestyle changes · Stay at a healthy weight. This is especially important if you put on weight around the waist. Losing even 10 pounds can help you lower your blood pressure. · If your doctor recommends it, get more exercise. Walking is a good choice. Bit by bit, increase the amount you walk every day. Try for at least 30 minutes on most days of the week. You also may want to swim, bike, or do other activities. · Avoid or limit alcohol. Talk to your doctor about whether you can drink any alcohol. · Try to limit how much sodium you eat to less than 2,300 milligrams (mg) a day. Your doctor may ask you to try to eat less than 1,500 mg a day. · Eat plenty of fruits (such as bananas and oranges), vegetables, legumes, whole grains, and low-fat dairy products. · Lower the amount of saturated fat in your diet. Saturated fat is found in animal products such as milk, cheese, and meat. Limiting these foods may help you lose weight and also lower your risk for heart disease. · Do not smoke. Smoking increases your risk for heart attack and stroke. If you need help quitting, talk to your doctor about stop-smoking programs and medicines. These can increase your chances of quitting for good. When should you call for help? Call  911 anytime you think you may need emergency care. This may mean having symptoms that suggest that your blood pressure is causing a serious heart or blood vessel problem. Your blood pressure may be over 180/120. For example, call 911 if: 
  · You have symptoms of a heart attack. These may include: 
? Chest pain or pressure, or a strange feeling in the chest. 
? Sweating. ? Shortness of breath. ? Nausea or vomiting. ? Pain, pressure, or a strange feeling in the back, neck, jaw, or upper belly or in one or both shoulders or arms. ? Lightheadedness or sudden weakness.  
? A fast or irregular heartbeat.  
  · You have symptoms of a stroke. These may include: 
? Sudden numbness, tingling, weakness, or loss of movement in your face, arm, or leg, especially on only one side of your body. ? Sudden vision changes. ? Sudden trouble speaking. ? Sudden confusion or trouble understanding simple statements. ? Sudden problems with walking or balance. ? A sudden, severe headache that is different from past headaches.  
  · You have severe back or belly pain. Do not wait until your blood pressure comes down on its own. Get help right away. Call your doctor now or seek immediate care if: 
  · Your blood pressure is much higher than normal (such as 180/120 or higher), but you don't have symptoms.  
  · You think high blood pressure is causing symptoms, such as: 
? Severe headache. 
? Blurry vision. Watch closely for changes in your health, and be sure to contact your doctor if: 
  · Your blood pressure measures higher than your doctor recommends at least 2 times. That means the top number is higher or the bottom number is higher, or both.  
  · You think you may be having side effects from your blood pressure medicine. Where can you learn more? Go to http://www.gray.com/ Enter G596 in the search box to learn more about \"High Blood Pressure: Care Instructions. \" Current as of: August 31, 2020               Content Version: 12.8 © 2006-2021 [employer]. Care instructions adapted under license by "[employer] (which disclaims liability or warranty for this information). If you have questions about a medical condition or this instruction, always ask your healthcare professional. [place] [address] any warranty or liability for your use of this information. Learning About the [address] Diet What is the Mediterranean diet? The Mediterranean diet is a style of eating rather than a diet plan.  It features foods eaten in [place], [place], [place] [facility] [place], and other countries along the [facility]. It emphasizes eating foods like fish, fruits, vegetables, beans, high-fiber breads and whole grains, nuts, and olive oil. This style of eating includes limited red meat, cheese, and sweets. Why choose the Mediterranean diet? A Mediterranean-style diet may improve heart health. It contains more fat than other heart-healthy diets. But the fats are mainly from nuts, unsaturated oils (such as fish oils and olive oil), and certain nut or seed oils (such as canola, soybean, or flaxseed oil). These fats may help protect the heart and blood vessels. How can you get started on the Mediterranean diet? Here are some things you can do to switch to a more Mediterranean way of eating. What to eat · Eat a variety of fruits and vegetables each day, such as grapes, blueberries, tomatoes, broccoli, peppers, figs, olives, spinach, eggplant, beans, lentils, and chickpeas. · Eat a variety of whole-grain foods each day, such as oats, brown rice, and whole wheat bread, pasta, and couscous. · Eat fish at least 2 times a week. Try tuna, salmon, mackerel, lake trout, herring, or sardines. · Eat moderate amounts of low-fat dairy products, such as milk, cheese, or yogurt. · Eat moderate amounts of poultry and eggs. · Choose healthy (unsaturated) fats, such as nuts, olive oil, and certain nut or seed oils like canola, soybean, and flaxseed. · Limit unhealthy (saturated) fats, such as butter, palm oil, and coconut oil. And limit fats found in animal products, such as meat and dairy products made with whole milk. Try to eat red meat only a few times a month in very small amounts. · Limit sweets and desserts to only a few times a week. This includes sugar-sweetened drinks like soda. The Mediterranean diet may also include red wine with your meal1 glass each day for women and up to 2 glasses a day for men. Tips for eating at home · Use herbs, spices, garlic, lemon zest, and citrus juice instead of salt to add flavor to foods. · Add avocado slices to your sandwich instead of torres. · Have fish for lunch or dinner instead of red meat. Brush the fish with olive oil, and broil or grill it. · Sprinkle your salad with seeds or nuts instead of cheese. · Cook with olive or canola oil instead of butter or oils that are high in saturated fat. · Switch from 2% milk or whole milk to 1% or fat-free milk. · Dip raw vegetables in a vinaigrette dressing or hummus instead of dips made from mayonnaise or sour cream. 
· Have a piece of fruit for dessert instead of a piece of cake. Try baked apples, or have some dried fruit. Tips for eating out · Try broiled, grilled, baked, or poached fish instead of having it fried or breaded. · Ask your  to have your meals prepared with olive oil instead of butter. · Order dishes made with marinara sauce or sauces made from olive oil. Avoid sauces made from cream or mayonnaise. · Choose whole-grain breads, whole wheat pasta and pizza crust, brown rice, beans, and lentils. · Cut back on butter or margarine on bread. Instead, you can dip your bread in a small amount of olive oil. · Ask for a side salad or grilled vegetables instead of french fries or chips. Where can you learn more? Go to http://www.gray.com/ Enter 681-996-7147 in the search box to learn more about \"Learning About the Mediterranean Diet. \" Current as of: December 17, 2020               Content Version: 12.8 © 7786-9398 [employer]. Care instructions adapted under license by [employer] (which disclaims liability or warranty for this information). If you have questions about a medical condition or this instruction, always ask your healthcare professional. [place] [address] any warranty or liability for your use of this information.

## 2021-06-12 LAB
ALBUMIN SERPL-MCNC: 4.4 G/DL (ref 3.5–5)
ALBUMIN/GLOB SERPL: 1.5 {RATIO} (ref 1.1–2.2)
ALP SERPL-CCNC: 71 U/L (ref 45–117)
ALT SERPL-CCNC: 21 U/L (ref 12–78)
ANION GAP SERPL CALC-SCNC: 3 MMOL/L (ref 5–15)
AST SERPL-CCNC: 21 U/L (ref 15–37)
BILIRUB SERPL-MCNC: 0.6 MG/DL (ref 0.2–1)
BUN SERPL-MCNC: 18 MG/DL (ref 6–20)
BUN/CREAT SERPL: 24 (ref 12–20)
CALCIUM SERPL-MCNC: 9.8 MG/DL (ref 8.5–10.1)
CHLORIDE SERPL-SCNC: 103 MMOL/L (ref 97–108)
CO2 SERPL-SCNC: 33 MMOL/L (ref 21–32)
CREAT SERPL-MCNC: 0.76 MG/DL (ref 0.55–1.02)
EST. AVERAGE GLUCOSE BLD GHB EST-MCNC: 91 MG/DL
GLOBULIN SER CALC-MCNC: 3 G/DL (ref 2–4)
GLUCOSE SERPL-MCNC: 83 MG/DL (ref 65–100)
HBA1C MFR BLD: 4.8 % (ref 4–5.6)
HCV AB SERPL QL IA: NONREACTIVE
HCV COMMENT,HCGAC: NORMAL
POTASSIUM SERPL-SCNC: 4.8 MMOL/L (ref 3.5–5.1)
PROT SERPL-MCNC: 7.4 G/DL (ref 6.4–8.2)
SODIUM SERPL-SCNC: 139 MMOL/L (ref 136–145)

## 2021-06-14 LAB
ENA SS-A AB SER-ACNC: <0.2 AI (ref 0–0.9)
ENA SS-B AB SER-ACNC: <0.2 AI (ref 0–0.9)

## 2021-06-24 LAB
AMIKACIN ISLT MIC: NORMAL
CIPROFLOXACIN ISLT MIC: NORMAL
CLARITHRO ISLT MIC: NORMAL
ETHAMBUTOL ISLT MIC: NORMAL
LINEZOLID ISLT MIC: NORMAL
MICROORGANISM/AGENT SPEC: NORMAL
MOXIFLOXACIN ISLT MIC: NORMAL
PLEASE NOTE, AFR16: NORMAL
RIFAMPIN ISLT MIC: NORMAL
SPECIMEN SOURCE: NORMAL
STREPTOMYCIN ISLT MIC: NORMAL

## 2021-07-02 ENCOUNTER — TRANSCRIBE ORDER (OUTPATIENT)
Dept: SCHEDULING | Age: 81
End: 2021-07-02

## 2021-07-02 DIAGNOSIS — Z12.31 OTHER SCREENING MAMMOGRAM: Primary | ICD-10-CM

## 2021-07-09 ENCOUNTER — TELEPHONE (OUTPATIENT)
Dept: OBGYN CLINIC | Age: 81
End: 2021-07-09

## 2021-07-09 NOTE — TELEPHONE ENCOUNTER
Patient said she probably will go get a culture done at [facility] or similar place. She will arrange to have records sent to us incase a follow up is needed.

## 2021-07-09 NOTE — TELEPHONE ENCOUNTER
Patient of FW    68 y.o started with pimple like painful areas on the outside of the opening of the vagina (all on one side). She does not know of any history of having HSV but has had these lesions previously. She said that the last time she had a lesion was well over a year ago, she was seen on 4/28/20 for lesion. She said that they do not drain and they have almost a red center to the pimple like bump. She said the spots have been present now going on 3 days. Should she go get it swabbed at an urgent care center? She will try to attach a picture via [employer]. Gagan Conti 57

## 2021-07-09 NOTE — TELEPHONE ENCOUNTER
The biopsy that was done was normal.  If these look like pimples or ulcers then she should have a culture obtained. I worry if she waits until Monday they will no longer be present for a culture.  So she is welcome to go to an urgent care location today or come to the office on Monday

## 2021-07-23 ENCOUNTER — OFFICE VISIT (OUTPATIENT)
Dept: OBGYN CLINIC | Age: 81
End: 2021-07-23
Payer: MEDICARE

## 2021-07-23 VITALS — WEIGHT: 149 LBS | SYSTOLIC BLOOD PRESSURE: 162 MMHG | BODY MASS INDEX: 24.79 KG/M2 | DIASTOLIC BLOOD PRESSURE: 81 MMHG

## 2021-07-23 DIAGNOSIS — Z01.419 ENCOUNTER FOR GYNECOLOGICAL EXAMINATION WITHOUT ABNORMAL FINDING: Primary | ICD-10-CM

## 2021-07-23 PROCEDURE — G8432 DEP SCR NOT DOC, RNG: HCPCS | Performed by: OBSTETRICS & GYNECOLOGY

## 2021-07-23 PROCEDURE — G8753 SYS BP > OR = 140: HCPCS | Performed by: OBSTETRICS & GYNECOLOGY

## 2021-07-23 PROCEDURE — G8754 DIAS BP LESS 90: HCPCS | Performed by: OBSTETRICS & GYNECOLOGY

## 2021-07-23 PROCEDURE — G8420 CALC BMI NORM PARAMETERS: HCPCS | Performed by: OBSTETRICS & GYNECOLOGY

## 2021-07-23 PROCEDURE — 1101F PT FALLS ASSESS-DOCD LE1/YR: CPT | Performed by: OBSTETRICS & GYNECOLOGY

## 2021-07-23 PROCEDURE — 99397 PER PM REEVAL EST PAT 65+ YR: CPT | Performed by: OBSTETRICS & GYNECOLOGY

## 2021-07-23 PROCEDURE — 1090F PRES/ABSN URINE INCON ASSESS: CPT | Performed by: OBSTETRICS & GYNECOLOGY

## 2021-07-23 RX ORDER — VALACYCLOVIR HYDROCHLORIDE 1 G/1
1000 TABLET, FILM COATED ORAL DAILY
Qty: 30 TABLET | Refills: 0 | Status: SHIPPED | OUTPATIENT
Start: 2021-07-23 | End: 2021-07-28

## 2021-07-23 NOTE — PROGRESS NOTES
Annual exam ages 65+    Carline Conti is a ,  68 y.o. female   No LMP recorded. Patient is postmenopausal.    She presents for her annual checkup. She is having no significant problems. She was recently diagnosed with HSV. Menstrual status:    Her periods are absent due to menopause. Contraception:    The current method of family planning is NA post menopause. Hormonal status:    She is not having vasomotor symptoms. The patient is not using any ERT. Sexual history:    She  reports being sexually active and has had partner(s) who are Male. She reports using the following method of birth control/protection: None. Medical conditions:    Since her last annual GYN exam about three or more years ago, she has not the following changes in her health history: none. Pap and Mammogram History:    Her most recent Pap smear was normal obtained 9 year(s) ago. The patient had a recent mammogram 2020 which was negative for malignancy. Breast Cancer History/Substance Abuse: negative    Osteoporosis History:    Family history does not include a first or second degree relative with osteopenia or osteoporosis. She is currently taking calcium and vit D. Past Medical History:   Diagnosis Date    Fracture of left foot 2018    Glaucoma     Dr. Van Sever    Pap smear for cervical cancer screening      neg  neg 2012 neg    Right shoulder pain      Past Surgical History:   Procedure Laterality Date    HX BREAST BIOPSY Left yrs ago    neg    HX ORTHOPAEDIC Right 2019    Bone Spur     HX OTHER SURGICAL      Sciatic injection     HX TONSILLECTOMY      IR BRONCHOSCOPY  2021       Current Outpatient Medications   Medication Sig Dispense Refill    metroNIDAZOLE (METROCREAM) 0.75 % topical cream Apply  to affected area two (2) times a day.       triamcinolone acetonide (KENALOG) 0.1 % topical cream       amLODIPine (NORVASC) 2.5 mg tablet Take 1 Tablet by mouth daily. Indications: high blood pressure 30 Tablet 0    Calcium-Cholecalciferol, D3, (Calcium 600 with Vitamin D3) 600 mg(1,500mg) -400 unit chew Take  by mouth. Calcium 520mgs / Vitamin D3 50mcg 2 tablets a day      OTHER,NON-FORMULARY, Zinc 30 mg 1 tablet once a day.  dorzolamide-timolol (COSOPT) 22.3-6.8 mg/mL ophthalmic solution Administer 1 Drop to both eyes two (2) times a day.  latanoprost (XALATAN) 0.005 % ophthalmic solution Administer 1 Drop to both eyes daily. Allergies: Patient has no known allergies. Tobacco History:  reports that she has never smoked. She has never used smokeless tobacco.  Alcohol Abuse:  reports current alcohol use of about 3.0 standard drinks of alcohol per week. Drug Abuse:  reports no history of drug use. Family Medical/Cancer History:   Family History   Problem Relation Age of Onset    No Known Problems Mother         Review of Systems - History obtained from the patient  Constitutional: negative for weight loss, fever, night sweats  HEENT: negative for hearing loss, earache, congestion, snoring, sorethroat  CV: negative for chest pain, palpitations, edema  Resp: negative for cough, shortness of breath, wheezing  GI: negative for change in bowel habits, abdominal pain, black or bloody stools  : negative for frequency, dysuria, hematuria, vaginal discharge  MSK: negative for back pain, joint pain, muscle pain  Breast: negative for breast lumps, nipple discharge, galactorrhea  Skin :negative for itching, rash, hives  Neuro: negative for dizziness, headache, confusion, weakness  Psych: negative for anxiety, depression, change in mood  Heme/lymph: negative for bleeding, bruising, pallor    Physical Exam    There were no vitals taken for this visit.     Constitutional  · Appearance: well-nourished, well developed, alert, in no acute distress    HENT  · Head and Face: appears normal    Neck  · Inspection/Palpation: normal appearance, no masses or tenderness  · Lymph Nodes: no lymphadenopathy present  · Thyroid: gland size normal, nontender, no nodules or masses present on palpation    Chest  · Respiratory Effort: breathing unlabored    Breasts  · Inspection of Breasts: breasts symmetrical, no skin changes, no discharge present, nipple appearance normal, no skin retraction present  · Palpation of Breasts and Axillae: no masses present on palpation, no breast tenderness  · Axillary Lymph Nodes: no lymphadenopathy present    Gastrointestinal  · Abdominal Examination: abdomen non-tender to palpation, normal bowel sounds, no masses present  · Liver and spleen: no hepatomegaly present, spleen not palpable  · Hernias: no hernias identified    Genitourinary  · External Genitalia: normal appearance for age, no discharge present, no tenderness present, no inflammatory lesions present, no masses present, atrophy present  · Vagina: atrophic but otherwise normal vaginal vault without central or paravaginal defects, no discharge present, no inflammatory lesions present, no masses present  · Bladder: non-tender to palpation  · Urethra: appears normal  · Cervix: normal   · Uterus: normal size, shape and consistency  · Adnexa: no adnexal tenderness present, no adnexal masses present  · Perineum: perineum within normal limits, no evidence of trauma, no rashes or skin lesions present  · Anus: anus within normal limits, no hemorrhoids present  · Inguinal Lymph Nodes: no lymphadenopathy present    Skin  · General Inspection: no rash, no lesions identified    Neurologic/Psychiatric  · Mental Status:  · Orientation: grossly oriented to person, place and time  · Mood and Affect: mood normal, affect appropriate    Assessment:  Routine gynecologic examination  Her current medical status is satisfactory with no evidence of significant gynecologic issues.     Plan:  Counseled re: diet, exercise, healthy lifestyle  Return for yearly wellness visits  Rec annual mammogram

## 2021-08-09 ENCOUNTER — TELEPHONE (OUTPATIENT)
Dept: INTERNAL MEDICINE CLINIC | Age: 81
End: 2021-08-09

## 2021-08-09 DIAGNOSIS — R03.0 ELEVATED BLOOD PRESSURE READING IN OFFICE WITHOUT DIAGNOSIS OF HYPERTENSION: ICD-10-CM

## 2021-08-09 RX ORDER — AMLODIPINE BESYLATE 5 MG/1
5 TABLET ORAL DAILY
Qty: 30 TABLET | Refills: 2 | Status: SHIPPED | OUTPATIENT
Start: 2021-08-09 | End: 2021-09-14 | Stop reason: SDUPTHER

## 2021-08-09 NOTE — TELEPHONE ENCOUNTER
I spoke with patient, she was seen in June and started on 2.5mg of Amlodipine. She started taking medication in August because her blood pressure Systolic was over 712A. Her average Systolic reading is 602 and the highest it has been is 156. She does a headache on/off. No other sx. She has SOB but its related to another issue she is dealing with. I also schedule pt a MWV in September. She needs a refill on her BP medication but wants to know if pcp wants to increase her medication? Please send refill to pts pharmacy.

## 2021-08-09 NOTE — TELEPHONE ENCOUNTER
Patient would like a call back from the nurse regarding her blood pressure medication. Her recent readings have been higher. Since 8/1 when she started the medication her readings have been averaging 156 on the first number, and 143. Should her medication dosage be adjusted? She is taking amLODIPine currently once a day.

## 2021-09-14 ENCOUNTER — OFFICE VISIT (OUTPATIENT)
Dept: INTERNAL MEDICINE CLINIC | Age: 81
End: 2021-09-14
Payer: MEDICARE

## 2021-09-14 VITALS
HEIGHT: 65 IN | DIASTOLIC BLOOD PRESSURE: 88 MMHG | RESPIRATION RATE: 14 BRPM | OXYGEN SATURATION: 98 % | HEART RATE: 65 BPM | SYSTOLIC BLOOD PRESSURE: 128 MMHG | TEMPERATURE: 98 F | WEIGHT: 149 LBS | BODY MASS INDEX: 24.83 KG/M2

## 2021-09-14 DIAGNOSIS — Z13.39 SCREENING FOR ALCOHOLISM: ICD-10-CM

## 2021-09-14 DIAGNOSIS — M54.50 CHRONIC BILATERAL LOW BACK PAIN WITHOUT SCIATICA: ICD-10-CM

## 2021-09-14 DIAGNOSIS — Z12.11 COLON CANCER SCREENING: ICD-10-CM

## 2021-09-14 DIAGNOSIS — J47.9 BRONCHIECTASIS WITHOUT COMPLICATION (HCC): ICD-10-CM

## 2021-09-14 DIAGNOSIS — I10 ESSENTIAL HYPERTENSION: ICD-10-CM

## 2021-09-14 DIAGNOSIS — Z00.00 MEDICARE ANNUAL WELLNESS VISIT, SUBSEQUENT: Primary | ICD-10-CM

## 2021-09-14 DIAGNOSIS — Z13.31 SCREENING FOR DEPRESSION: ICD-10-CM

## 2021-09-14 DIAGNOSIS — Z12.31 BREAST CANCER SCREENING BY MAMMOGRAM: ICD-10-CM

## 2021-09-14 DIAGNOSIS — G89.29 CHRONIC BILATERAL LOW BACK PAIN WITHOUT SCIATICA: ICD-10-CM

## 2021-09-14 DIAGNOSIS — Z12.31 ENCOUNTER FOR SCREENING MAMMOGRAM FOR MALIGNANT NEOPLASM OF BREAST: ICD-10-CM

## 2021-09-14 PROCEDURE — G8420 CALC BMI NORM PARAMETERS: HCPCS | Performed by: INTERNAL MEDICINE

## 2021-09-14 PROCEDURE — G0439 PPPS, SUBSEQ VISIT: HCPCS | Performed by: INTERNAL MEDICINE

## 2021-09-14 PROCEDURE — 1101F PT FALLS ASSESS-DOCD LE1/YR: CPT | Performed by: INTERNAL MEDICINE

## 2021-09-14 PROCEDURE — G8399 PT W/DXA RESULTS DOCUMENT: HCPCS | Performed by: INTERNAL MEDICINE

## 2021-09-14 PROCEDURE — 1090F PRES/ABSN URINE INCON ASSESS: CPT | Performed by: INTERNAL MEDICINE

## 2021-09-14 PROCEDURE — G8432 DEP SCR NOT DOC, RNG: HCPCS | Performed by: INTERNAL MEDICINE

## 2021-09-14 PROCEDURE — G8752 SYS BP LESS 140: HCPCS | Performed by: INTERNAL MEDICINE

## 2021-09-14 PROCEDURE — 99213 OFFICE O/P EST LOW 20 MIN: CPT | Performed by: INTERNAL MEDICINE

## 2021-09-14 PROCEDURE — G8754 DIAS BP LESS 90: HCPCS | Performed by: INTERNAL MEDICINE

## 2021-09-14 PROCEDURE — G8427 DOCREV CUR MEDS BY ELIG CLIN: HCPCS | Performed by: INTERNAL MEDICINE

## 2021-09-14 PROCEDURE — G8536 NO DOC ELDER MAL SCRN: HCPCS | Performed by: INTERNAL MEDICINE

## 2021-09-14 RX ORDER — AMLODIPINE BESYLATE 5 MG/1
5 TABLET ORAL DAILY
Qty: 90 TABLET | Refills: 3 | Status: SHIPPED | OUTPATIENT
Start: 2021-09-14 | End: 2022-03-15

## 2021-09-14 NOTE — PATIENT INSTRUCTIONS

## 2021-09-14 NOTE — PROGRESS NOTES
Diagnoses and all orders for this visit:    Patient presents for her Medicare wellness visit. She is also here to follow-up with regards to her pulmonary issues, hypertension and back pain    Diagnoses and all orders for this visit:    1. Medicare annual wellness visit, subsequent  Medicare wellness visit completed    -     [facility] MAMMO BI SCREENING INCL CAD; Future  -     REFERRAL TO DERMATOLOGY  -     REFERRAL TO OPHTHALMOLOGY    2. Colon cancer screening patient defers colonoscopy  Agrees to do fit test  -     OCCULT BLOOD IMMUNOASSAY,DIAGNOSTIC; Future    3. Breast cancer screening by mammogram  Will do tomosynthesis mammogram in 2022  -     [facility] MAMMO BI SCREENING INCL CAD; Future      5. Screening for alcoholism   No issues with alcohol    6. Screening for depression  Denies depression    7. Essential hypertension  Home blood pressure readings well controlled  Initial blood pressure elevated but on recheck improved  -     amLODIPine (NORVASC) 5 mg tablet; Take 1 Tablet by mouth daily. Indications: high blood pressure    8. Bronchiectasis without complication [facility])  Follow-up with Dr. Dung Woo  Being evaluated for MAC  Has follow-up in November with CT and pulmonary    Encouraged Covid vaccine and in light of bronchiectasis would benefit from vaccine. She will discuss with her . 9. Chronic bilateral low back pain without sciatica  She will follow up with Dr. Nadia Perez             Chief Complaint   Patient presents with   [facility] Wellness Visit     she would also like to follow up on other things - here for her annual          Bronchiectasis    Patient has been following with Dr. Dung Woo pulmonary. She has a CT November. She is not on any antibiotics. Her energy level is good. She denies shortness of breath or coughing. She has not had any further hemoptysis.       Outbreak with genital herpes   First outbreak and diagnosed by Dr. Angelina Hauser  She notes she has had some stress    ostoporosis  Does not want treatment so therefore no Bone density      Not vaccinated  Discussion with patient and encouraged Covid vaccine      Advanced directives  Reviewed with me and if terminal or medical therapy fetal she prefers against intubation or compressions. She will fill out her form and send back to me      Defers colonscopy  Did dot mammogram    Subjective:   Carline Conti is a 68 y.o. female with hypertension. Hypertension ROS: taking medications as instructed, no medication side effects noted, no TIA's, no chest pain on exertion, no dyspnea on exertion, no swelling of ankles. New concerns: Home readings discussed and very rare readings in the 140 range. Overall in the 1 10-1 20 range. Back pain  Chronic back pain but stable. She feels her symptoms are starting to return. .  She is followed by pain specialist.she is still seeing Dr. Chago Avendaño. Past Medical History:   Diagnosis Date    Fracture of left foot 06/2018    Glaucoma     Dr. Van Sever    Pap smear for cervical cancer screening     2009 neg 2010 neg 2012 neg    Right shoulder pain      Past Surgical History:   Procedure Laterality Date    HX BREAST BIOPSY Left yrs ago    neg    HX ORTHOPAEDIC Right 2019    Bone Spur     HX OTHER SURGICAL  2020    Sciatic injection     HX TONSILLECTOMY      IR BRONCHOSCOPY  04/2021     Social History     Socioeconomic History    Marital status:      Spouse name: Not on file    Number of children: Not on file    Years of education: Not on file    Highest education level: Not on file   Tobacco Use    Smoking status: Never Smoker    Smokeless tobacco: Never Used   Vaping Use    Vaping Use: Never used   Substance and Sexual Activity    Alcohol use:  Yes     Alcohol/week: 3.0 standard drinks     Types: 3 Glasses of wine per week     Comment: rarely    Drug use: No    Sexual activity: Yes     Partners: Male     Birth control/protection: None   Social History Narrative    Retired from [employer]         to  healthy    2 children daughters : healthy    2 grandson's : healthy     Social Determinants of Health     Financial Resource Strain:     Difficulty of Paying Living Expenses:    Food Insecurity:     Worried About Running Out of Food in the Last Year:     Ran Out of Food in the Last Year:    Transportation Needs:     Lack of Transportation (Medical):  Lack of Transportation (Non-Medical):    Physical Activity:     Days of Exercise per Week:     Minutes of Exercise per Session:    Stress:     Feeling of Stress :    Social Connections:     Frequency of Communication with Friends and Family:     Frequency of Social Gatherings with Friends and Family:     Attends [employer] Services:     Active Member of Clubs or Organizations:     Attends Club or Organization Meetings:     Marital Status:      Family History   Problem Relation Age of Onset    No Known Problems Mother      Current Outpatient Medications   Medication Sig Dispense Refill    amLODIPine (NORVASC) 5 mg tablet Take 1 Tablet by mouth daily. Indications: high blood pressure 30 Tablet 2    ZINC PO Take  by mouth.  metroNIDAZOLE (METROCREAM) 0.75 % topical cream Apply  to affected area two (2) times a day.  triamcinolone acetonide (KENALOG) 0.1 % topical cream       Calcium-Cholecalciferol, D3, (Calcium 600 with Vitamin D3) 600 mg(1,500mg) -400 unit chew Take  by mouth. Calcium 520mgs / Vitamin D3 50mcg 2 tablets a day      dorzolamide-timolol (COSOPT) 22.3-6.8 mg/mL ophthalmic solution Administer 1 Drop to both eyes two (2) times a day.  latanoprost (XALATAN) 0.005 % ophthalmic solution Administer 1 Drop to both eyes daily.  calcium carb/vit D3/minerals (CALCIUM-VITAMIN D PO) Take  by mouth. (Patient not taking: Reported on 9/14/2021)      OTHER,NON-FORMULARY, Zinc 30 mg 1 tablet once a day.  (Patient not taking: Reported on 9/14/2021)       No Known Allergies    Review of Systems - General ROS: negative for - chills, fatigue, fever, sleep disturbance or weight loss  Cardiovascular ROS: no chest pain or dyspnea on exertion  Respiratory ROS: no cough, shortness of breath, or wheezing    Visit Vitals  BP (!) 165/82 (BP 1 Location: Left upper arm, BP Patient Position: Sitting, BP Cuff Size: Adult)   Pulse 65   Temp 98 °F (36.7 °C) (Oral)   Resp 14   Ht 5' 5\" (1.651 m)   Wt 149 lb (67.6 kg)   SpO2 98%   BMI 24.79 kg/m²     Constitutional: [x] Appears well-developed and well-nourished [x] No apparent distress      [] Abnormal -     Mental status: [x] Alert and awake  [x] Oriented to person/place/time [x] Able to follow commands    [] Abnormal -     Eyes:   EOM    [x]  Normal    [] Abnormal -   Sclera  [x]  Normal    [] Abnormal -          Discharge [x]  None visible   [] Abnormal -     HENT: [x] Normocephalic, atraumatic  [] Abnormal -   [x] Mouth/Throat: Mucous membranes are moist    External Ears [x] Normal  [] Abnormal -    Neck: [x] No visualized mass [] Abnormal -     Pulmonary/Chest: [x] Respiratory effort normal   [x] No visualized signs of difficulty breathing or respiratory distress        [] Abnormal -   Bilateral crackles posteriorly     Musculoskeletal:   [x] Normal gait with no signs of ataxia         [x] Normal range of motion of neck        [] Abnormal -     Neurological:        [x] No Facial Asymmetry (Cranial nerve 7 motor function) (limited exam due to video visit)          [x] No gaze palsy        [] Abnormal -          Skin:        [x] No significant exanthematous lesions or discoloration noted on facial skin         [] Abnormal -            Psychiatric:       [x] Normal Affect [] Abnormal -        [x] No Hallucinations      ATTENTION:   This medical record was transcribed using an electronic medical records/speech recognition system. Although proofread, it may and can contain electronic, spelling and other errors. Corrections may be executed at a later time.   Please contact us for any clarifications as needed aside from patient's Medicare wellness visit on this date 09/14/21  I have spent 20 minutes reviewing previous notes, test results and face to face with the patient discussing the diagnosis and importance of compliance with the treatment plan as well as documenting on the day of the visit. This is the Subsequent Medicare Annual Wellness Exam, performed 12 months or more after the Initial AWV or the last Subsequent AWV    I have reviewed the patient's medical history in detail and updated the computerized patient record. Assessment/Plan   Education and counseling provided:  Are appropriate based on today's review and evaluation  End-of-Life planning (with patient's consent)  Pneumococcal Vaccine patient defers pneumonia vaccine, flu vaccine, she will think about Covid vaccine  Influenza Vaccine  Screening Mammography up-to-date  Colorectal cancer screening tests will do fit test  Bone mass measurement (DEXA) again discussed with patient and defers as she does not want treatment for osteoporosis  Screening for glaucoma follows with Dr. Bryon Forde    1. Medicare annual wellness visit, subsequent  -     [facility] MAMMO BI SCREENING INCL CAD; Future  -     REFERRAL TO DERMATOLOGY  -     REFERRAL TO OPHTHALMOLOGY  2. Colon cancer screening  3. Breast cancer screening by mammogram  -     [facility] MAMMO BI SCREENING INCL CAD; Future  4. Encounter for screening mammogram for malignant neoplasm of breast  -     [facility] MAMMO BI SCREENING INCL CAD; Future  5. Screening for alcoholism  6.  Screening for depression       Depression Risk Factor Screening     3 most recent PHQ Screens 6/11/2021   Little interest or pleasure in doing things Not at all   Feeling down, depressed, irritable, or hopeless Not at all   Total Score PHQ 2 0       Alcohol Risk Screen    Do you average more than 1 drink per night or more than 7 drinks a week:  No    On any one occasion in the past three months have you have had more than 3 drinks containing alcohol:  No        Functional Ability and Level of Safety    Hearing: Hearing is good. Activities of Daily Living: The home contains: no safety equipment. Patient does total self care      Ambulation: with no difficulty     Fall Risk:  Fall Risk Assessment, last 12 mths 6/11/2021   Able to walk? Yes   Fall in past 12 months? 0   Do you feel unsteady? 0   Are you worried about falling 0   Number of falls in past 12 months -   Fall with injury? -      Abuse Screen:  Patient is not abused       Cognitive Screening    Has your family/caregiver stated any concerns about your memory: no     Cognitive Screening: Normal - Verbal Fluency Test    Health Maintenance Due     Health Maintenance Due   Topic Date Due    COVID-19 Vaccine (1) Never done       Patient Care Team   Patient Care Team:  Alisia Erickson MD as PCP - General (Internal Medicine)  Alisia Erickson MD as PCP - [address] [place] [address] Provider  Chani Cazares MD as Physician (Obstetrics & Gynecology)    History     Patient Active Problem List   Diagnosis Code    Advanced care planning/counseling discussion Z71.89     Past Medical History:   Diagnosis Date    Fracture of left foot 06/2018    Glaucoma     Dr. Dipesh Hale    Pap smear for cervical cancer screening     2009 neg 2010 neg 2012 neg    Right shoulder pain       Past Surgical History:   Procedure Laterality Date    HX BREAST BIOPSY Left yrs ago    neg    HX ORTHOPAEDIC Right 2019    Bone Spur     HX OTHER SURGICAL  2020    Sciatic injection     HX TONSILLECTOMY      IR BRONCHOSCOPY  04/2021     Current Outpatient Medications   Medication Sig Dispense Refill    amLODIPine (NORVASC) 5 mg tablet Take 1 Tablet by mouth daily. Indications: high blood pressure 30 Tablet 2    ZINC PO Take  by mouth.       metroNIDAZOLE (METROCREAM) 0.75 % topical cream Apply  to affected area two (2) times a day.      triamcinolone acetonide (KENALOG) 0.1 % topical cream       Calcium-Cholecalciferol, D3, (Calcium 600 with Vitamin D3) 600 mg(1,500mg) -400 unit chew Take  by mouth. Calcium 520mgs / Vitamin D3 50mcg 2 tablets a day      dorzolamide-timolol (COSOPT) 22.3-6.8 mg/mL ophthalmic solution Administer 1 Drop to both eyes two (2) times a day.  latanoprost (XALATAN) 0.005 % ophthalmic solution Administer 1 Drop to both eyes daily.  calcium carb/vit D3/minerals (CALCIUM-VITAMIN D PO) Take  by mouth. (Patient not taking: Reported on 9/14/2021)      OTHER,NON-FORMULARY, Zinc 30 mg 1 tablet once a day. (Patient not taking: Reported on 9/14/2021)       No Known Allergies    Family History   Problem Relation Age of Onset    No Known Problems Mother      Social History     Tobacco Use    Smoking status: Never Smoker    Smokeless tobacco: Never Used   Substance Use Topics    Alcohol use:  Yes     Alcohol/week: 3.0 standard drinks     Types: 3 Glasses of wine per week     Comment: rarely         Shiloh Cabrera MD

## 2021-09-21 ENCOUNTER — TRANSCRIBE ORDER (OUTPATIENT)
Dept: SCHEDULING | Age: 81
End: 2021-09-21

## 2021-09-21 DIAGNOSIS — Z12.31 VISIT FOR SCREENING MAMMOGRAM: Primary | ICD-10-CM

## 2021-10-27 ENCOUNTER — TELEPHONE (OUTPATIENT)
Dept: INTERNAL MEDICINE CLINIC | Age: 81
End: 2021-10-27

## 2021-10-27 NOTE — TELEPHONE ENCOUNTER
Patient was calling to inform us that patient tested positive for COVID this morning - just wanted to keep us informed.

## 2021-11-05 ENCOUNTER — HOSPITAL ENCOUNTER (OUTPATIENT)
Dept: CT IMAGING | Age: 81
End: 2021-11-05
Attending: INTERNAL MEDICINE

## 2021-11-12 DIAGNOSIS — R91.8 MULTIPLE LUNG NODULES ON CT: ICD-10-CM

## 2021-11-23 RX ORDER — ONDANSETRON 4 MG/1
4 TABLET, ORALLY DISINTEGRATING ORAL
Qty: 12 TABLET | Refills: 0 | Status: SHIPPED | OUTPATIENT
Start: 2021-11-23 | End: 2022-03-31

## 2021-12-06 ENCOUNTER — TELEPHONE (OUTPATIENT)
Dept: INTERNAL MEDICINE CLINIC | Age: 81
End: 2021-12-06

## 2021-12-06 ENCOUNTER — HOSPITAL ENCOUNTER (OUTPATIENT)
Dept: ULTRASOUND IMAGING | Age: 81
Discharge: HOME | End: 2021-12-06
Attending: INTERNAL MEDICINE
Payer: MEDICARE

## 2021-12-06 ENCOUNTER — OFFICE VISIT (OUTPATIENT)
Dept: INTERNAL MEDICINE CLINIC | Age: 81
End: 2021-12-06
Attending: INTERNAL MEDICINE
Payer: MEDICARE

## 2021-12-06 VITALS
SYSTOLIC BLOOD PRESSURE: 133 MMHG | DIASTOLIC BLOOD PRESSURE: 84 MMHG | RESPIRATION RATE: 14 BRPM | OXYGEN SATURATION: 99 % | HEART RATE: 83 BPM | HEIGHT: 65 IN | WEIGHT: 149 LBS | BODY MASS INDEX: 24.83 KG/M2 | TEMPERATURE: 98.6 F

## 2021-12-06 DIAGNOSIS — M79.89 LEG SWELLING: Primary | ICD-10-CM

## 2021-12-06 DIAGNOSIS — J18.9 PNEUMONIA OF RIGHT LOWER LOBE DUE TO INFECTIOUS ORGANISM: ICD-10-CM

## 2021-12-06 DIAGNOSIS — Z63.4 BEREAVEMENT: ICD-10-CM

## 2021-12-06 DIAGNOSIS — I10 ESSENTIAL HYPERTENSION: ICD-10-CM

## 2021-12-06 DIAGNOSIS — R04.2 HEMOPTYSIS: ICD-10-CM

## 2021-12-06 DIAGNOSIS — Z12.31 OTHER SCREENING MAMMOGRAM: ICD-10-CM

## 2021-12-06 PROCEDURE — 99214 OFFICE O/P EST MOD 30 MIN: CPT | Performed by: INTERNAL MEDICINE

## 2021-12-06 PROCEDURE — G8536 NO DOC ELDER MAL SCRN: HCPCS | Performed by: INTERNAL MEDICINE

## 2021-12-06 PROCEDURE — G8754 DIAS BP LESS 90: HCPCS | Performed by: INTERNAL MEDICINE

## 2021-12-06 PROCEDURE — 1101F PT FALLS ASSESS-DOCD LE1/YR: CPT | Performed by: INTERNAL MEDICINE

## 2021-12-06 PROCEDURE — 93970 EXTREMITY STUDY: CPT

## 2021-12-06 PROCEDURE — G8427 DOCREV CUR MEDS BY ELIG CLIN: HCPCS | Performed by: INTERNAL MEDICINE

## 2021-12-06 PROCEDURE — G8510 SCR DEP NEG, NO PLAN REQD: HCPCS | Performed by: INTERNAL MEDICINE

## 2021-12-06 PROCEDURE — G8399 PT W/DXA RESULTS DOCUMENT: HCPCS | Performed by: INTERNAL MEDICINE

## 2021-12-06 PROCEDURE — G8420 CALC BMI NORM PARAMETERS: HCPCS | Performed by: INTERNAL MEDICINE

## 2021-12-06 PROCEDURE — 1090F PRES/ABSN URINE INCON ASSESS: CPT | Performed by: INTERNAL MEDICINE

## 2021-12-06 PROCEDURE — G8752 SYS BP LESS 140: HCPCS | Performed by: INTERNAL MEDICINE

## 2021-12-06 RX ORDER — FUROSEMIDE 20 MG/1
20 TABLET ORAL
Qty: 30 TABLET | Refills: 0 | Status: SHIPPED | OUTPATIENT
Start: 2021-12-06 | End: 2021-12-27

## 2021-12-06 SDOH — SOCIAL STABILITY - SOCIAL INSECURITY: DISSAPEARANCE AND DEATH OF FAMILY MEMBER: Z63.4

## 2021-12-06 NOTE — TELEPHONE ENCOUNTER
Doppler schedule for today at 12:30pm [address] [facility] 150. Pt was given appt information in the office.

## 2021-12-06 NOTE — PROGRESS NOTES
Diagnoses and all orders for this visit:    1. Leg swelling  History of Covid  Ruled out for DVT  Will start Lasix  Follow-up in 1 month to reevaluate and check labs    -     DUPLEX LOWER EXT VENOUS BILAT; Future  -     furosemide (LASIX) 20 mg tablet; Take 1 Tablet by mouth daily as needed (edema). 2. Essential hypertension  Edema occurred after increase of amlodipine to 5 mg  Will monitor  Blood pressure looks well controlled  Continue meds    3. Other screening mammogram  -     [facility] MAMMO BI SCREENING INCL CAD  -     [facility] 3D [facility] W MAMMO BI SCREENING INCL CAD    4. Hemoptysis  Following with pulmonary  History of steroid use which may have caused some edema  -     CT CHEST W CONT F/U  -     XR CHEST PA LAT    5. Pneumonia of right lower lobe due to infectious organism  Follow-up with pulmonary  -     XR CHEST PA LAT    6. Bereavement  Patient reports her , Aliza Alcaraz, passed last week from [employer] listening provided and support given  Daughter and son-in-law also in town and will be providing support for patient         Chief Complaint   Patient presents with    Foot Swelling     Patient says the swelling has been going on for over 3 weeks        Bereavement  Patient reports her , Austen Yu, passed from [address] She reports she had Covid and did not have severe symptoms. He developed Covid and became very ill. He improved but then got acutely ill and passed last week. Patient is with her daughter and son-in-law. They are giving her good care. Subjective:   Lucian David is a 80 y.o. female with hypertension. Hypertension ROS: taking medications as instructed, no medication side effects noted, no TIA's, no chest pain on exertion, no dyspnea on exertion, no swelling of ankles. New concerns: She reports a swelling in her legs over the past 2 weeks. She has been on amlodipine since October. She is also had steroids for her lung/Covid symptoms.   She also had a steroid injection. Past Medical History:   Diagnosis Date    Fracture of left foot 06/2018    Glaucoma     Dr. Fransisco Florez    Pap smear for cervical cancer screening     2009 neg 2010 neg 2012 neg    Right shoulder pain      Past Surgical History:   Procedure Laterality Date    HX BREAST BIOPSY Left yrs ago    neg    HX ORTHOPAEDIC Right 2019    Bone Spur     HX OTHER SURGICAL  2020    Sciatic injection     HX TONSILLECTOMY      IR BRONCHOSCOPY  04/2021     Social History     Socioeconomic History    Marital status:    Tobacco Use    Smoking status: Never Smoker    Smokeless tobacco: Never Used   Vaping Use    Vaping Use: Never used   Substance and Sexual Activity    Alcohol use: Yes     Alcohol/week: 3.0 standard drinks     Types: 3 Glasses of wine per week     Comment: rarely    Drug use: No    Sexual activity: Yes     Partners: Male     Birth control/protection: None   Social History Narrative    Retired from [employer]         to  healthy    2 children daughters : healthy    2 grandson's : healthy     Family History   Problem Relation Age of Onset    No Known Problems Mother      Current Outpatient Medications   Medication Sig Dispense Refill    amLODIPine (NORVASC) 5 mg tablet Take 1 Tablet by mouth daily. Indications: high blood pressure 90 Tablet 3    ZINC PO Take  by mouth.  triamcinolone acetonide (KENALOG) 0.1 % topical cream       Calcium-Cholecalciferol, D3, (Calcium 600 with Vitamin D3) 600 mg(1,500mg) -400 unit chew Take  by mouth. Calcium 520mgs / Vitamin D3 50mcg 2 tablets a day      dorzolamide-timolol (COSOPT) 22.3-6.8 mg/mL ophthalmic solution Administer 1 Drop to both eyes two (2) times a day.  latanoprost (XALATAN) 0.005 % ophthalmic solution Administer 1 Drop to both eyes daily.  ondansetron (ZOFRAN ODT) 4 mg disintegrating tablet Take 1 Tablet by mouth every eight (8) hours as needed for Nausea or Vomiting.  (Patient not taking: Reported on 12/6/2021) 12 Tablet 0    calcium carb/vit D3/minerals (CALCIUM-VITAMIN D PO) Take  by mouth. (Patient not taking: Reported on 9/14/2021)      metroNIDAZOLE (METROCREAM) 0.75 % topical cream Apply  to affected area two (2) times a day. (Patient not taking: Reported on 12/6/2021)      OTHER,NON-FORMULARY, Zinc 30 mg 1 tablet once a day.  (Patient not taking: Reported on 9/14/2021)       No Known Allergies    Review of Systems - General ROS: negative for - chills or fever  Cardiovascular ROS: no chest pain or dyspnea on exertion  Respiratory ROS: no cough, shortness of breath, or wheezing    Visit Vitals  /84 (BP 1 Location: Left upper arm, BP Patient Position: Sitting, BP Cuff Size: Adult)   Pulse 83   Temp 98.6 °F (37 °C) (Oral)   Resp 14   Ht 5' 5\" (1.651 m)   Wt 149 lb (67.6 kg)   SpO2 99%   BMI 24.79 kg/m²     Constitutional: [x] Appears well-developed and well-nourished [x] No apparent distress      [] Abnormal -     Mental status: [x] Alert and awake  [x] Oriented to person/place/time [x] Able to follow commands    [] Abnormal -     Eyes:   EOM    [x]  Normal    [] Abnormal -   Sclera  [x]  Normal    [] Abnormal -          Discharge [x]  None visible   [] Abnormal -     HENT: [x] Normocephalic, atraumatic  [] Abnormal -   [x] Mouth/Throat: Mucous membranes are moist    External Ears [x] Normal  [] Abnormal -    Neck: [x] No visualized mass [] Abnormal -     Pulmonary/Chest: [x] Respiratory effort normal   [x] No visualized signs of difficulty breathing or respiratory distress        [] Abnormal -      Musculoskeletal:   [x] Normal gait with no signs of ataxia         [x] Normal range of motion of neck        [] Abnormal -     Neurological:        [x] No Facial Asymmetry (Cranial nerve 7 motor function) (limited exam due to video visit)          [x] No gaze palsy        [] Abnormal -          Skin:        [x] No significant exanthematous lesions or discoloration noted on facial skin         [] Abnormal -            Psychiatric:       [x] Normal Affect [] Abnormal -        [x] No Hallucinations      ATTENTION:   This medical record was transcribed using an electronic medical records/speech recognition system. Although proofread, it may and can contain electronic, spelling and other errors. Corrections may be executed at a later time. Please contact us for any clarifications as needed. On this date 12/06/21  I have spent 40 minutes reviewing previous notes, test results and face to face with the patient discussing the diagnosis and importance of compliance with the treatment plan as well as documenting on the day of the visit.

## 2021-12-07 ENCOUNTER — TELEPHONE (OUTPATIENT)
Dept: INTERNAL MEDICINE CLINIC | Age: 81
End: 2021-12-07

## 2021-12-07 NOTE — TELEPHONE ENCOUNTER
I spoke with patient to advise of pcp's message below. Pt verbalized understanding and was thankful.

## 2021-12-07 NOTE — TELEPHONE ENCOUNTER
----- Message from Lizzette Meléndez MD sent at 12/6/2021  5:02 PM EST -----  Please let pt know thank you for getting. No clot or dvt which is good.

## 2021-12-23 DIAGNOSIS — M79.89 LEG SWELLING: ICD-10-CM

## 2021-12-27 RX ORDER — FUROSEMIDE 20 MG/1
TABLET ORAL
Qty: 30 TABLET | Refills: 0 | Status: SHIPPED | OUTPATIENT
Start: 2021-12-27 | End: 2022-01-17

## 2022-01-17 DIAGNOSIS — M79.89 LEG SWELLING: ICD-10-CM

## 2022-01-17 RX ORDER — FUROSEMIDE 20 MG/1
TABLET ORAL
Qty: 30 TABLET | Refills: 0 | Status: SHIPPED | OUTPATIENT
Start: 2022-01-17 | End: 2022-01-19

## 2022-01-18 ENCOUNTER — TELEPHONE (OUTPATIENT)
Dept: INTERNAL MEDICINE CLINIC | Age: 82
End: 2022-01-18

## 2022-01-18 NOTE — TELEPHONE ENCOUNTER
I spoke with pharmacy, insurance can't approve med refill until 1/21/22. Pt states she is doing lasix 40mg daily and it helped with her swelling. She ran out of medication last Wednesday and now all the progression she made with her swelling is returning again because she has been out of her medication. She wants to know if pcp can send in a new script that states take 1 40mg tablet daily.

## 2022-01-18 NOTE — TELEPHONE ENCOUNTER
Patient is calling to follow up on her refill for Furosemide, states the pharmacy has a start date of this Friday the 21st. Please call to correct. Needs to  medication.

## 2022-01-19 DIAGNOSIS — M79.89 LEG SWELLING: ICD-10-CM

## 2022-01-19 RX ORDER — FUROSEMIDE 40 MG/1
40 TABLET ORAL DAILY
Qty: 30 TABLET | Refills: 2 | Status: SHIPPED | OUTPATIENT
Start: 2022-01-19 | End: 2022-03-15

## 2022-01-19 NOTE — TELEPHONE ENCOUNTER
Lasix 40 written  I wrote for 30 tabs because would like there bmp or cmp check to ensure her potssium does not decrease too much

## 2022-03-15 ENCOUNTER — OFFICE VISIT (OUTPATIENT)
Dept: INTERNAL MEDICINE CLINIC | Age: 82
End: 2022-03-15
Payer: MEDICARE

## 2022-03-15 VITALS
HEIGHT: 65 IN | BODY MASS INDEX: 24.83 KG/M2 | DIASTOLIC BLOOD PRESSURE: 85 MMHG | WEIGHT: 149 LBS | SYSTOLIC BLOOD PRESSURE: 156 MMHG | OXYGEN SATURATION: 98 % | HEART RATE: 78 BPM | TEMPERATURE: 97.7 F | RESPIRATION RATE: 14 BRPM

## 2022-03-15 DIAGNOSIS — R68.89 COLD INTOLERANCE: ICD-10-CM

## 2022-03-15 DIAGNOSIS — I10 ESSENTIAL HYPERTENSION: Primary | ICD-10-CM

## 2022-03-15 DIAGNOSIS — J47.9 BRONCHIECTASIS WITHOUT COMPLICATION (HCC): ICD-10-CM

## 2022-03-15 PROCEDURE — 1090F PRES/ABSN URINE INCON ASSESS: CPT | Performed by: INTERNAL MEDICINE

## 2022-03-15 PROCEDURE — G8420 CALC BMI NORM PARAMETERS: HCPCS | Performed by: INTERNAL MEDICINE

## 2022-03-15 PROCEDURE — G8427 DOCREV CUR MEDS BY ELIG CLIN: HCPCS | Performed by: INTERNAL MEDICINE

## 2022-03-15 PROCEDURE — 99214 OFFICE O/P EST MOD 30 MIN: CPT | Performed by: INTERNAL MEDICINE

## 2022-03-15 PROCEDURE — G8536 NO DOC ELDER MAL SCRN: HCPCS | Performed by: INTERNAL MEDICINE

## 2022-03-15 PROCEDURE — G8753 SYS BP > OR = 140: HCPCS | Performed by: INTERNAL MEDICINE

## 2022-03-15 PROCEDURE — G8510 SCR DEP NEG, NO PLAN REQD: HCPCS | Performed by: INTERNAL MEDICINE

## 2022-03-15 PROCEDURE — 1101F PT FALLS ASSESS-DOCD LE1/YR: CPT | Performed by: INTERNAL MEDICINE

## 2022-03-15 PROCEDURE — G8754 DIAS BP LESS 90: HCPCS | Performed by: INTERNAL MEDICINE

## 2022-03-15 PROCEDURE — G8399 PT W/DXA RESULTS DOCUMENT: HCPCS | Performed by: INTERNAL MEDICINE

## 2022-03-15 RX ORDER — AMLODIPINE BESYLATE 2.5 MG/1
2.5 TABLET ORAL DAILY
Qty: 30 TABLET | Refills: 0
Start: 2022-03-15 | End: 2022-03-31

## 2022-03-15 RX ORDER — HYDROCHLOROTHIAZIDE 12.5 MG/1
12.5 TABLET ORAL DAILY
Qty: 30 TABLET | Refills: 0 | Status: SHIPPED | OUTPATIENT
Start: 2022-03-15 | End: 2022-03-31 | Stop reason: SDUPTHER

## 2022-03-15 NOTE — PROGRESS NOTES
Diagnoses and all orders for this visit:    1. Essential hypertension  Not optimally controlled  Suspect amlodipine 5 mg aggravated swelling and Lasix discontinued    Discussed with patient and Tonia. We will decrease her amlodipine back to 2.5 mg and try her on 12.5 mg hydrochlorothiazide. If blood pressure not improving and edema still present can increase to 25 mg hydrochlorothiazide      She will need to follow-up with me in 2 weeks to recheck her blood pressure and labs. She is transitioning to [place] with her daughter and son-in-law. -     METABOLIC PANEL, COMPREHENSIVE; Future  -     MAGNESIUM; Future  -     amLODIPine (NORVASC) 2.5 mg tablet; Take 1 Tablet by mouth daily. Indications: high blood pressure  -     hydroCHLOROthiazide (HYDRODIURIL) 12.5 mg tablet; Take 1 Tablet by mouth daily. 2. Bronchiectasis without complication ([address]  Stable  She has been followed by pulmonary and being monitored. 3. Cold intolerance  New problem  Patient reports cold hands feet  -     CBC W/O DIFF; Future  -     TSH 3RD GENERATION; Future           Chief Complaint   Patient presents with    Foot Swelling     Patient and daughter Capo Mustafa along with son-in-law will move to [place]. They are [employer]. Subjective:   Funmilayo Nascimento is a 80 y.o. female with hypertension. Hypertension ROS: taking medications as instructed, no medication side effects noted, no TIA's, no chest pain on exertion, no dyspnea on exertion, no swelling of ankles. New concerns: She reports that she has been swelling since December. She was using Lasix which provided good benefit however she developed nausea. She stopped the Lasix and her swelling continued. She does not have any chest pain or shortness of breath. She has not been checking her blood pressure at home. Cold intolerance  She reports no fatigue but an increase in cold intolerance in her hands and feet. Her right foot greater than her left.   Her weight is stable. Past Medical History:   Diagnosis Date    Fracture of left foot 06/2018    Glaucoma     Dr. Alex Mclaughlin    Pap smear for cervical cancer screening     2009 neg 2010 neg 2012 neg    Right shoulder pain      Past Surgical History:   Procedure Laterality Date    HX BREAST BIOPSY Left yrs ago    neg    HX ORTHOPAEDIC Right 2019    Bone Spur     HX OTHER SURGICAL  2020    Sciatic injection     HX TONSILLECTOMY      IR BRONCHOSCOPY  04/2021     Social History     Socioeconomic History    Marital status:    Tobacco Use    Smoking status: Never Smoker    Smokeless tobacco: Never Used   Vaping Use    Vaping Use: Never used   Substance and Sexual Activity    Alcohol use: Yes     Alcohol/week: 3.0 standard drinks     Types: 3 Glasses of wine per week     Comment: rarely    Drug use: No    Sexual activity: Yes     Partners: Male     Birth control/protection: None   Social History Narrative    Retired from [employer]         to  healthy    2 children daughters : healthy    2 grandson's : healthy     Family History   Problem Relation Age of Onset    No Known Problems Mother      Current Outpatient Medications   Medication Sig Dispense Refill    furosemide (LASIX) 40 mg tablet Take 1 Tablet by mouth daily. 30 Tablet 2    amLODIPine (NORVASC) 5 mg tablet Take 1 Tablet by mouth daily. Indications: high blood pressure 90 Tablet 3    ZINC PO Take  by mouth.  triamcinolone acetonide (KENALOG) 0.1 % topical cream       Calcium-Cholecalciferol, D3, (Calcium 600 with Vitamin D3) 600 mg(1,500mg) -400 unit chew Take  by mouth. Calcium 520mgs / Vitamin D3 50mcg 2 tablets a day      dorzolamide-timolol (COSOPT) 22.3-6.8 mg/mL ophthalmic solution Administer 1 Drop to both eyes two (2) times a day.  latanoprost (XALATAN) 0.005 % ophthalmic solution Administer 1 Drop to both eyes daily.       ondansetron (ZOFRAN ODT) 4 mg disintegrating tablet Take 1 Tablet by mouth every eight (8) hours as needed for Nausea or Vomiting. (Patient not taking: Reported on 12/6/2021) 12 Tablet 0    calcium carb/vit D3/minerals (CALCIUM-VITAMIN D PO) Take  by mouth. (Patient not taking: Reported on 9/14/2021)      metroNIDAZOLE (METROCREAM) 0.75 % topical cream Apply  to affected area two (2) times a day. (Patient not taking: Reported on 12/6/2021)      OTHER,NON-FORMULARY, Zinc 30 mg 1 tablet once a day.  (Patient not taking: Reported on 9/14/2021)       No Known Allergies    Review of Systems - General ROS: negative for - chills or fever  Cardiovascular ROS: no chest pain or dyspnea on exertion  Respiratory ROS: no cough, shortness of breath, or wheezing    Visit Vitals  BP (!) 156/85 (BP 1 Location: Left upper arm, BP Patient Position: Sitting, BP Cuff Size: Adult)   Pulse 78   Temp 97.7 °F (36.5 °C) (Oral)   Resp 14   Ht 5' 5\" (1.651 m)   Wt 149 lb (67.6 kg)   SpO2 98%   BMI 24.79 kg/m²     Constitutional: [x] Appears well-developed and well-nourished [x] No apparent distress      [] Abnormal -     Mental status: [x] Alert and awake  [x] Oriented to person/place/time [x] Able to follow commands    [] Abnormal -     Eyes:   EOM    [x]  Normal    [] Abnormal -   Sclera  [x]  Normal    [] Abnormal -          Discharge [x]  None visible   [] Abnormal -     HENT: [x] Normocephalic, atraumatic  [] Abnormal -   [x] Mouth/Throat: Mucous membranes are moist    External Ears [x] Normal  [] Abnormal -    Neck: [x] No visualized mass [] Abnormal -     Pulmonary/Chest: [x] Respiratory effort normal   [x] No visualized signs of difficulty breathing or respiratory distress        [] Abnormal -      Musculoskeletal:   [x] Normal gait with no signs of ataxia         [x] Normal range of motion of neck        [] Abnormal -     3+ edema to shin on right tibia and 2+ edema on left tibia  Bilateral pedal pulses appreciated    Neurological:        [x] No Facial Asymmetry (Cranial nerve 7 motor function) (limited exam due to video visit)          [x] No gaze palsy        [] Abnormal -          Skin:        [x] No significant exanthematous lesions or discoloration noted on facial skin         [] Abnormal -            Psychiatric:       [x] Normal Affect [] Abnormal -        [x] No Hallucinations      ATTENTION:   This medical record was transcribed using an electronic medical records/speech recognition system. Although proofread, it may and can contain electronic, spelling and other errors. Corrections may be executed at a later time. Please contact us for any clarifications as needed.

## 2022-03-16 LAB
ALBUMIN SERPL-MCNC: 4.1 G/DL (ref 3.5–5)
ALBUMIN/GLOB SERPL: 1.6 {RATIO} (ref 1.1–2.2)
ALP SERPL-CCNC: 68 U/L (ref 45–117)
ALT SERPL-CCNC: 27 U/L (ref 12–78)
ANION GAP SERPL CALC-SCNC: 4 MMOL/L (ref 5–15)
AST SERPL-CCNC: 19 U/L (ref 15–37)
BILIRUB SERPL-MCNC: 0.7 MG/DL (ref 0.2–1)
BUN SERPL-MCNC: 21 MG/DL (ref 6–20)
BUN/CREAT SERPL: 29 (ref 12–20)
CALCIUM SERPL-MCNC: 9.4 MG/DL (ref 8.5–10.1)
CHLORIDE SERPL-SCNC: 107 MMOL/L (ref 97–108)
CO2 SERPL-SCNC: 27 MMOL/L (ref 21–32)
CREAT SERPL-MCNC: 0.72 MG/DL (ref 0.55–1.02)
ERYTHROCYTE [DISTWIDTH] IN BLOOD BY AUTOMATED COUNT: 13.8 % (ref 11.5–14.5)
GLOBULIN SER CALC-MCNC: 2.5 G/DL (ref 2–4)
GLUCOSE SERPL-MCNC: 124 MG/DL (ref 65–100)
HCT VFR BLD AUTO: 42.4 % (ref 35–47)
HGB BLD-MCNC: 14.3 G/DL (ref 11.5–16)
MAGNESIUM SERPL-MCNC: 2.2 MG/DL (ref 1.6–2.4)
MCH RBC QN AUTO: 31.4 PG (ref 26–34)
MCHC RBC AUTO-ENTMCNC: 33.7 G/DL (ref 30–36.5)
MCV RBC AUTO: 93.2 FL (ref 80–99)
NRBC # BLD: 0 K/UL (ref 0–0.01)
NRBC BLD-RTO: 0 PER 100 WBC
PLATELET # BLD AUTO: 211 K/UL (ref 150–400)
PMV BLD AUTO: 12.3 FL (ref 8.9–12.9)
POTASSIUM SERPL-SCNC: 4.4 MMOL/L (ref 3.5–5.1)
PROT SERPL-MCNC: 6.6 G/DL (ref 6.4–8.2)
RBC # BLD AUTO: 4.55 M/UL (ref 3.8–5.2)
SODIUM SERPL-SCNC: 138 MMOL/L (ref 136–145)
TSH SERPL DL<=0.05 MIU/L-ACNC: 1.58 UIU/ML (ref 0.36–3.74)
WBC # BLD AUTO: 6 K/UL (ref 3.6–11)

## 2022-03-18 PROBLEM — Z71.89 ADVANCED CARE PLANNING/COUNSELING DISCUSSION: Status: ACTIVE | Noted: 2017-08-02

## 2022-03-31 ENCOUNTER — OFFICE VISIT (OUTPATIENT)
Dept: INTERNAL MEDICINE CLINIC | Age: 82
End: 2022-03-31
Payer: MEDICARE

## 2022-03-31 VITALS
TEMPERATURE: 98 F | BODY MASS INDEX: 24.83 KG/M2 | WEIGHT: 149 LBS | RESPIRATION RATE: 14 BRPM | OXYGEN SATURATION: 97 % | HEIGHT: 65 IN | SYSTOLIC BLOOD PRESSURE: 138 MMHG | HEART RATE: 84 BPM | DIASTOLIC BLOOD PRESSURE: 83 MMHG

## 2022-03-31 DIAGNOSIS — I10 ESSENTIAL HYPERTENSION: Primary | ICD-10-CM

## 2022-03-31 PROCEDURE — G8399 PT W/DXA RESULTS DOCUMENT: HCPCS | Performed by: INTERNAL MEDICINE

## 2022-03-31 PROCEDURE — G8752 SYS BP LESS 140: HCPCS | Performed by: INTERNAL MEDICINE

## 2022-03-31 PROCEDURE — G8510 SCR DEP NEG, NO PLAN REQD: HCPCS | Performed by: INTERNAL MEDICINE

## 2022-03-31 PROCEDURE — G8420 CALC BMI NORM PARAMETERS: HCPCS | Performed by: INTERNAL MEDICINE

## 2022-03-31 PROCEDURE — 1090F PRES/ABSN URINE INCON ASSESS: CPT | Performed by: INTERNAL MEDICINE

## 2022-03-31 PROCEDURE — G8754 DIAS BP LESS 90: HCPCS | Performed by: INTERNAL MEDICINE

## 2022-03-31 PROCEDURE — 1101F PT FALLS ASSESS-DOCD LE1/YR: CPT | Performed by: INTERNAL MEDICINE

## 2022-03-31 PROCEDURE — G8536 NO DOC ELDER MAL SCRN: HCPCS | Performed by: INTERNAL MEDICINE

## 2022-03-31 PROCEDURE — G8427 DOCREV CUR MEDS BY ELIG CLIN: HCPCS | Performed by: INTERNAL MEDICINE

## 2022-03-31 PROCEDURE — 99214 OFFICE O/P EST MOD 30 MIN: CPT | Performed by: INTERNAL MEDICINE

## 2022-03-31 RX ORDER — OLMESARTAN MEDOXOMIL 5 MG/1
10 TABLET ORAL DAILY
Qty: 90 TABLET | Refills: 0 | Status: SHIPPED | OUTPATIENT
Start: 2022-03-31 | End: 2022-04-14

## 2022-03-31 RX ORDER — HYDROCHLOROTHIAZIDE 12.5 MG/1
12.5 TABLET ORAL DAILY
Qty: 90 TABLET | Refills: 0 | Status: SHIPPED | OUTPATIENT
Start: 2022-03-31 | End: 2022-07-11

## 2022-03-31 NOTE — PROGRESS NOTES
Diagnoses and all orders for this visit:    1. Essential hypertension  Controlled but not optimal due to edema in legs likely from the amlodipine  Discontinue amlodipine  Continue hydrochlorothiazide and add Benicar 10 mg  She will check her blood pressure and touch base with me if not controlled we will go to 15 mg and if needed 20 mg. She will MyChart me to let me know how her readings are. She will get her labs checked 2 weeks prior to leaving for [place]. -     olmesartan (BENICAR) 5 mg tablet; Take 2 Tablets by mouth daily. If bp not controlled can take 3 tabs in the am  -     hydroCHLOROthiazide (HYDRODIURIL) 12.5 mg tablet; Take 1 Tablet by mouth daily.  -     METABOLIC PANEL, BASIC; Future           Chief Complaint   Patient presents with    Follow-up       Patient presents with her daughter Lucrecia Cueva. They will be moving to [place]  in late May. Subjective:   Leigh Haque is a 80 y.o. female with hypertension. Hypertension ROS: taking medications as instructed, no medication side effects noted, no TIA's, no chest pain on exertion, no dyspnea on exertion, no swelling of ankles. New concerns: Patient presents blood pressure readings and is currently on amlodipine 5 mg and hydrochlorothiazide 12.5 mg.  Her readings are less than 140/80 and heart rate in the 60-70 range. She is still having some fluid in her legs. She is not having any chest pain or shortness of breath. She is doing some light walking around the house. Christina Willoughby        Past Medical History:   Diagnosis Date    Fracture of left foot 06/2018    Glaucoma     Dr. Steve Chisholm    Pap smear for cervical cancer screening     2009 neg 2010 neg 2012 neg    Right shoulder pain      Past Surgical History:   Procedure Laterality Date    HX BREAST BIOPSY Left yrs ago    neg    HX ORTHOPAEDIC Right 2019    Bone Spur     HX OTHER SURGICAL  2020    Sciatic injection     HX TONSILLECTOMY      IR BRONCHOSCOPY  04/2021     Social History Socioeconomic History    Marital status:    Tobacco Use    Smoking status: Never Smoker    Smokeless tobacco: Never Used   Vaping Use    Vaping Use: Never used   Substance and Sexual Activity    Alcohol use: Yes     Alcohol/week: 3.0 standard drinks     Types: 3 Glasses of wine per week     Comment: rarely    Drug use: No    Sexual activity: Yes     Partners: Male     Birth control/protection: None   Social History Narrative    Retired from [employer]         to  healthy    2 children daughters : healthy    2 grandson's : healthy     Family History   Problem Relation Age of Onset    No Known Problems Mother      Current Outpatient Medications   Medication Sig Dispense Refill    olmesartan (BENICAR) 5 mg tablet Take 2 Tablets by mouth daily. If bp not controlled can take 3 tabs in the am 90 Tablet 0    hydroCHLOROthiazide (HYDRODIURIL) 12.5 mg tablet Take 1 Tablet by mouth daily. 90 Tablet 0    ZINC PO Take  by mouth.  triamcinolone acetonide (KENALOG) 0.1 % topical cream       Calcium-Cholecalciferol, D3, (Calcium 600 with Vitamin D3) 600 mg(1,500mg) -400 unit chew Take  by mouth. Calcium 520mgs / Vitamin D3 50mcg 2 tablets a day      dorzolamide-timolol (COSOPT) 22.3-6.8 mg/mL ophthalmic solution Administer 1 Drop to both eyes two (2) times a day.  latanoprost (XALATAN) 0.005 % ophthalmic solution Administer 1 Drop to both eyes daily.  ondansetron (ZOFRAN ODT) 4 mg disintegrating tablet Take 1 Tablet by mouth every eight (8) hours as needed for Nausea or Vomiting. (Patient not taking: Reported on 12/6/2021) 12 Tablet 0    calcium carb/vit D3/minerals (CALCIUM-VITAMIN D PO) Take  by mouth. (Patient not taking: Reported on 9/14/2021)      metroNIDAZOLE (METROCREAM) 0.75 % topical cream Apply  to affected area two (2) times a day. (Patient not taking: Reported on 12/6/2021)      OTHER,NON-FORMULARY, Zinc 30 mg 1 tablet once a day.  (Patient not taking: Reported on 9/14/2021)       Allergies   Allergen Reactions    Amlodipine Other (comments)     edema       Review of Systems - General ROS: negative for - chills or fever  Cardiovascular ROS: no chest pain or dyspnea on exertion  Respiratory ROS: no cough, shortness of breath, or wheezing    Visit Vitals  BP (!) 148/79 (BP 1 Location: Left upper arm, BP Patient Position: Sitting, BP Cuff Size: Adult)   Pulse 84   Temp 98 °F (36.7 °C) (Oral)   Resp 14   Ht 5' 5\" (1.651 m)   Wt 149 lb (67.6 kg)   SpO2 97%   BMI 24.79 kg/m²     Constitutional: [x] Appears well-developed and well-nourished [x] No apparent distress      [] Abnormal -     Mental status: [x] Alert and awake  [x] Oriented to person/place/time [x] Able to follow commands    [] Abnormal -     Eyes:   EOM    [x]  Normal    [] Abnormal -   Sclera  [x]  Normal    [] Abnormal -          Discharge [x]  None visible   [] Abnormal -     HENT: [x] Normocephalic, atraumatic  [] Abnormal -   [x] Mouth/Throat: Mucous membranes are moist    External Ears [x] Normal  [] Abnormal -    Neck: [x] No visualized mass [] Abnormal -     Pulmonary/Chest: [x] Respiratory effort normal   [x] No visualized signs of difficulty breathing or respiratory distress        [] Abnormal -      Musculoskeletal:   [x] Normal gait with no signs of ataxia         [x] Normal range of motion of neck        [] Abnormal -     Neurological:        [x] No Facial Asymmetry (Cranial nerve 7 motor function) (limited exam due to video visit)          [x] No gaze palsy        [] Abnormal -          Skin:        [x] No significant exanthematous lesions or discoloration noted on facial skin         [] Abnormal -            Psychiatric:       [x] Normal Affect [] Abnormal -        [x] No Hallucinations        This medical record was transcribed using an electronic medical records/speech recognition system. Although proofread, it may and can contain electronic, spelling and other errors.   Corrections may be executed at a later time. Please contact us for any clarifications as needed.

## 2022-04-08 ENCOUNTER — PATIENT MESSAGE (OUTPATIENT)
Dept: INTERNAL MEDICINE CLINIC | Age: 82
End: 2022-04-08

## 2022-04-11 NOTE — TELEPHONE ENCOUNTER
From: Sue Ovalle  To: Pepe Zamudio MD  Sent: 4/8/2022 4:47 PM EDT  Subject: Memory Problems    Dr. Samson Mcnally,  Im concerned about issues Im having, due to decrease in my memory - more so than usual, and wondering if that could be related to Covid with which I was diagnosed last November. For instance, I had recently misplaced a bedspread we had put in a bag in my car to take to the . My daughter Mary Godinez said she thought maybe she had seen it in our storage unit. Reilly Pastor been moving a lot of stuff around, preparing for my move. Today I went to check the storage unit. I texted Mary Godinez that I looked in all the bags twice, but didnt find my bedspread. Later, she and her  took a load of things to put in storage and Mary Godinez called me and said the bedspread was there, in the bag. I cant explain what happened, but were concerned. There have been numerous occasions when I couldnt recall something, or have no memory of something whatsoever. Sometimes it takes me longer to remember something than it used to. Id like to see a doctor about this. Can you please recommend one I can call? Thank you.   Luis Beltrán

## 2022-04-14 ENCOUNTER — OFFICE VISIT (OUTPATIENT)
Dept: INTERNAL MEDICINE CLINIC | Age: 82
End: 2022-04-14
Payer: MEDICARE

## 2022-04-14 VITALS
HEIGHT: 65 IN | HEART RATE: 68 BPM | TEMPERATURE: 97.4 F | SYSTOLIC BLOOD PRESSURE: 129 MMHG | RESPIRATION RATE: 14 BRPM | WEIGHT: 146 LBS | DIASTOLIC BLOOD PRESSURE: 83 MMHG | BODY MASS INDEX: 24.32 KG/M2 | OXYGEN SATURATION: 99 %

## 2022-04-14 DIAGNOSIS — G31.84 MILD COGNITIVE IMPAIRMENT: ICD-10-CM

## 2022-04-14 DIAGNOSIS — R73.09 ELEVATED GLUCOSE: ICD-10-CM

## 2022-04-14 DIAGNOSIS — I10 ESSENTIAL HYPERTENSION: Primary | ICD-10-CM

## 2022-04-14 LAB — HBA1C MFR BLD HPLC: 4.9 %

## 2022-04-14 PROCEDURE — 1090F PRES/ABSN URINE INCON ASSESS: CPT | Performed by: INTERNAL MEDICINE

## 2022-04-14 PROCEDURE — G8536 NO DOC ELDER MAL SCRN: HCPCS | Performed by: INTERNAL MEDICINE

## 2022-04-14 PROCEDURE — G8752 SYS BP LESS 140: HCPCS | Performed by: INTERNAL MEDICINE

## 2022-04-14 PROCEDURE — 83036 HEMOGLOBIN GLYCOSYLATED A1C: CPT | Performed by: INTERNAL MEDICINE

## 2022-04-14 PROCEDURE — G8420 CALC BMI NORM PARAMETERS: HCPCS | Performed by: INTERNAL MEDICINE

## 2022-04-14 PROCEDURE — G8399 PT W/DXA RESULTS DOCUMENT: HCPCS | Performed by: INTERNAL MEDICINE

## 2022-04-14 PROCEDURE — G8427 DOCREV CUR MEDS BY ELIG CLIN: HCPCS | Performed by: INTERNAL MEDICINE

## 2022-04-14 PROCEDURE — G8510 SCR DEP NEG, NO PLAN REQD: HCPCS | Performed by: INTERNAL MEDICINE

## 2022-04-14 PROCEDURE — 1101F PT FALLS ASSESS-DOCD LE1/YR: CPT | Performed by: INTERNAL MEDICINE

## 2022-04-14 PROCEDURE — G8754 DIAS BP LESS 90: HCPCS | Performed by: INTERNAL MEDICINE

## 2022-04-14 PROCEDURE — 99214 OFFICE O/P EST MOD 30 MIN: CPT | Performed by: INTERNAL MEDICINE

## 2022-04-14 RX ORDER — OLMESARTAN MEDOXOMIL 5 MG/1
5 TABLET ORAL DAILY
Qty: 90 TABLET | Refills: 0
Start: 2022-04-14 | End: 2022-04-26

## 2022-04-14 NOTE — PROGRESS NOTES
Diagnoses and all orders for this visit:    1. Essential hypertension  Well-controlled  Continue Benicar and HCTZ  Discussed with patient and his blood pressure decreasing less than 110/70 consistently will drop the HCTZ  -     olmesartan (BENICAR) 5 mg tablet; Take 1 Tablet by mouth daily. 2. Elevated glucose  Controlled  -     AMB POC HEMOGLOBIN A1C    3. Mild cognitive impairment  Discussed with patient and will continue to monitor  Memory may be affected by multiple stressors: 's passing, Covid positive, anticipating move to [place]    Incontinence  Recommended timed voiding every 2 hours then moving to every 3 hours  Would like to avoid medication if possible          Chief Complaint   Patient presents with    Follow-up     patient has also been having trouble with her memory      Subjective:   Lebron Storey is a 80 y.o. female with hypertension. Hypertension ROS: taking medications as instructed, no medication side effects noted, no TIA's, no chest pain on exertion, no dyspnea on exertion, no swelling of ankles. New concerns: Blood pressure readings reviewed and 109-130 over 60s, 60-70 heart rate. She is on Benicar 5 mg. Labs reviewed      Incontinence  She notes that she is having to wear pads. She is losing her urine prior to getting to the bathroom. Memory issues  Patient acknowledges that she has some memory issues. Cami Alcala notes that patient was looking for a bedspread when it was clearly in the storage space. Patient and daughter have some concerns about patient's memory. Patient notes that sometimes she does not remember what she said but then remembers but other times does not remember.           Past Medical History:   Diagnosis Date    Fracture of left foot 06/2018    Glaucoma     Dr. Laure Pathak    Pap smear for cervical cancer screening     2009 neg 2010 neg 2012 neg    Right shoulder pain      Past Surgical History:   Procedure Laterality Date    HX BREAST BIOPSY Left yrs ago    neg    HX ORTHOPAEDIC Right 2019    Bone Spur     HX OTHER SURGICAL  2020    Sciatic injection     HX TONSILLECTOMY      IR BRONCHOSCOPY  04/2021     Social History     Socioeconomic History    Marital status:    Tobacco Use    Smoking status: Never Smoker    Smokeless tobacco: Never Used   Vaping Use    Vaping Use: Never used   Substance and Sexual Activity    Alcohol use: Yes     Alcohol/week: 3.0 standard drinks     Types: 3 Glasses of wine per week     Comment: rarely    Drug use: No    Sexual activity: Yes     Partners: Male     Birth control/protection: None   Social History Narrative    Retired from [employer]         to  healthy    2 children daughters : healthy    2 grandson's : healthy     Family History   Problem Relation Age of Onset    No Known Problems Mother      Current Outpatient Medications   Medication Sig Dispense Refill    olmesartan (BENICAR) 5 mg tablet Take 2 Tablets by mouth daily. If bp not controlled can take 3 tabs in the am 90 Tablet 0    hydroCHLOROthiazide (HYDRODIURIL) 12.5 mg tablet Take 1 Tablet by mouth daily. 90 Tablet 0    ZINC PO Take  by mouth.  triamcinolone acetonide (KENALOG) 0.1 % topical cream       Calcium-Cholecalciferol, D3, (Calcium 600 with Vitamin D3) 600 mg(1,500mg) -400 unit chew Take  by mouth. Calcium 520mgs / Vitamin D3 50mcg 2 tablets a day      dorzolamide-timolol (COSOPT) 22.3-6.8 mg/mL ophthalmic solution Administer 1 Drop to both eyes two (2) times a day.  latanoprost (XALATAN) 0.005 % ophthalmic solution Administer 1 Drop to both eyes daily.  calcium carb/vit D3/minerals (CALCIUM-VITAMIN D PO) Take  by mouth. (Patient not taking: Reported on 9/14/2021)      metroNIDAZOLE (METROCREAM) 0.75 % topical cream Apply  to affected area two (2) times a day. (Patient not taking: Reported on 12/6/2021)      OTHER,NON-FORMULARY, Zinc 30 mg 1 tablet once a day.  (Patient not taking: Reported on 9/14/2021)       Allergies   Allergen Reactions    Amlodipine Other (comments)     edema       Review of Systems - General ROS: negative for - chills or fever  Cardiovascular ROS: no chest pain or dyspnea on exertion  Respiratory ROS: no cough, shortness of breath, or wheezing    Visit Vitals  /83 (BP 1 Location: Left upper arm, BP Patient Position: Sitting, BP Cuff Size: Adult)   Pulse 68   Temp 97.4 °F (36.3 °C) (Oral)   Resp 14   Ht 5' 5\" (1.651 m)   Wt 146 lb (66.2 kg)   SpO2 99%   BMI 24.30 kg/m²     Constitutional: [x] Appears well-developed and well-nourished [x] No apparent distress      [] Abnormal -     Mental status: [x] Alert and awake  [x] Oriented to person/place/time [x] Able to follow commands    [] Abnormal -     Eyes:   EOM    [x]  Normal    [] Abnormal -   Sclera  [x]  Normal    [] Abnormal -          Discharge [x]  None visible   [] Abnormal -     HENT: [x] Normocephalic, atraumatic  [] Abnormal -   [x] Mouth/Throat: Mucous membranes are moist    External Ears [x] Normal  [] Abnormal -    Neck: [x] No visualized mass [] Abnormal -     Pulmonary/Chest: [x] Respiratory effort normal   [x] No visualized signs of difficulty breathing or respiratory distress        [] Abnormal -      Musculoskeletal:   [x] Normal gait with no signs of ataxia         [x] Normal range of motion of neck        [] Abnormal -     Neurological:        [x] No Facial Asymmetry (Cranial nerve 7 motor function) (limited exam due to video visit)          [x] No gaze palsy        [] Abnormal -          Skin:        [x] No significant exanthematous lesions or discoloration noted on facial skin         [] Abnormal -            Psychiatric:       [x] Normal Affect [] Abnormal -        [x] No Hallucinations        This medical record was transcribed using an electronic medical records/speech recognition system. Although proofread, it may and can contain electronic, spelling and other errors.   Corrections may be executed at a later time. Please contact us for any clarifications as needed. On this date 04/14/22  I have spent 34minutes reviewing previous notes, test results and face to face with the patient discussing the diagnosis and importance of compliance with the treatment plan as well as documenting on the day of the visit.

## 2022-04-26 DIAGNOSIS — I10 ESSENTIAL HYPERTENSION: ICD-10-CM

## 2022-04-26 RX ORDER — OLMESARTAN MEDOXOMIL 5 MG/1
TABLET ORAL
Qty: 90 TABLET | Refills: 0 | Status: SHIPPED | OUTPATIENT
Start: 2022-04-26 | End: 2022-04-26

## 2022-04-26 RX ORDER — OLMESARTAN MEDOXOMIL 5 MG/1
5 TABLET ORAL DAILY
Qty: 90 TABLET | Refills: 0 | Status: SHIPPED | OUTPATIENT
Start: 2022-04-26 | End: 2022-07-11

## 2022-05-25 ENCOUNTER — OFFICE VISIT (OUTPATIENT)
Dept: INTERNAL MEDICINE CLINIC | Age: 82
End: 2022-05-25
Payer: MEDICARE

## 2022-05-25 VITALS
OXYGEN SATURATION: 99 % | SYSTOLIC BLOOD PRESSURE: 130 MMHG | TEMPERATURE: 98.1 F | BODY MASS INDEX: 24.32 KG/M2 | HEART RATE: 66 BPM | RESPIRATION RATE: 14 BRPM | DIASTOLIC BLOOD PRESSURE: 89 MMHG | HEIGHT: 65 IN | WEIGHT: 146 LBS

## 2022-05-25 DIAGNOSIS — M54.6 CHRONIC BILATERAL THORACIC BACK PAIN: ICD-10-CM

## 2022-05-25 DIAGNOSIS — I10 ESSENTIAL HYPERTENSION: ICD-10-CM

## 2022-05-25 DIAGNOSIS — M54.50 ACUTE RIGHT-SIDED LOW BACK PAIN WITHOUT SCIATICA: Primary | ICD-10-CM

## 2022-05-25 DIAGNOSIS — G89.29 CHRONIC BILATERAL THORACIC BACK PAIN: ICD-10-CM

## 2022-05-25 PROCEDURE — G8427 DOCREV CUR MEDS BY ELIG CLIN: HCPCS | Performed by: INTERNAL MEDICINE

## 2022-05-25 PROCEDURE — G8399 PT W/DXA RESULTS DOCUMENT: HCPCS | Performed by: INTERNAL MEDICINE

## 2022-05-25 PROCEDURE — 1101F PT FALLS ASSESS-DOCD LE1/YR: CPT | Performed by: INTERNAL MEDICINE

## 2022-05-25 PROCEDURE — G8752 SYS BP LESS 140: HCPCS | Performed by: INTERNAL MEDICINE

## 2022-05-25 PROCEDURE — G8754 DIAS BP LESS 90: HCPCS | Performed by: INTERNAL MEDICINE

## 2022-05-25 PROCEDURE — 99213 OFFICE O/P EST LOW 20 MIN: CPT | Performed by: INTERNAL MEDICINE

## 2022-05-25 PROCEDURE — G8536 NO DOC ELDER MAL SCRN: HCPCS | Performed by: INTERNAL MEDICINE

## 2022-05-25 PROCEDURE — G8510 SCR DEP NEG, NO PLAN REQD: HCPCS | Performed by: INTERNAL MEDICINE

## 2022-05-25 PROCEDURE — 1090F PRES/ABSN URINE INCON ASSESS: CPT | Performed by: INTERNAL MEDICINE

## 2022-05-25 PROCEDURE — G8420 CALC BMI NORM PARAMETERS: HCPCS | Performed by: INTERNAL MEDICINE

## 2022-05-25 RX ORDER — CYCLOBENZAPRINE HCL 5 MG
5 TABLET ORAL 2 TIMES DAILY
Qty: 15 TABLET | Refills: 0 | Status: SHIPPED | OUTPATIENT
Start: 2022-05-25

## 2022-05-25 NOTE — PROGRESS NOTES
Diagnoses and all orders for this visit:    1. Acute right-sided low back pain without sciatica  New problem but likely related to change in posture and lifting/moving  Her suitcase and bed is on the floor so change in mechanics  UA normal unlikely UTI cancel culture  Will try Advil and ibuprofen if no improvement Flexeril but use cautiously  -     URINALYSIS W/MICROSCOPIC; Future  -     CULTURE, URINE; Future  -     cyclobenzaprine (FLEXERIL) 5 mg tablet; Take 1 Tablet by mouth two (2) times a day. Caution may cause sedation and falls please use carefully for back spasm /pain    2. Essential hypertension  [address] hypertension  Home readings still 1 20-1 30    3. Chronic bilateral thoracic back pain  Discussed chronic pain medicine versus continuing with mobility  Agrees with avoiding analgesics as may cause side effects. Chief Complaint   Patient presents with    Back Pain       Right back pian  Wakes her up in the am  Sudden onset about 1 week ago, no hx of kidney stones, no blood in urien  Pain above right hip and to the right  Improves with 400 mg ibuprofen in the day and 1 tab in the afternoon  Wakes her up in the am  Dull achy pain  Laying on eihter side does not help  No warm compress  Not like sciatica but feels pain hip like sciatica  May have aggravated with lifting things      Subjective:   Aniceto Aguilar is a 80 y.o. female with hypertension. Hypertension ROS: taking medications as instructed, no medication side effects noted, no TIA's, no chest pain on exertion, no dyspnea on exertion, no swelling of ankles. New concerns: Blood pressure readings reviewed and 109-130 over 60s, 60-70 heart rate. She is on Benicar 5 mg.   Labs reviewed      Incontinence  No complaints      Memory issues  No complaints        Past Medical History:   Diagnosis Date    Fracture of left foot 06/2018    Glaucoma     Dr. Vivek Wu    Pap smear for cervical cancer screening     2009 neg 2010 neg 2012 neg  Right shoulder pain      Past Surgical History:   Procedure Laterality Date    HX BREAST BIOPSY Left yrs ago    neg    HX ORTHOPAEDIC Right 2019    Bone Spur     HX OTHER SURGICAL  2020    Sciatic injection     HX TONSILLECTOMY      IR BRONCHOSCOPY  04/2021     Social History     Socioeconomic History    Marital status: OTHER   Tobacco Use    Smoking status: Never Smoker    Smokeless tobacco: Never Used   Vaping Use    Vaping Use: Never used   Substance and Sexual Activity    Alcohol use: Yes     Alcohol/week: 3.0 standard drinks     Types: 3 Glasses of wine per week     Comment: rarely    Drug use: No    Sexual activity: Yes     Partners: Male     Birth control/protection: None   Social History Narrative    Retired from [employer]         to  healthy    2 children daughters : healthy    2 grandson's : healthy     Family History   Problem Relation Age of Onset    No Known Problems Mother      Current Outpatient Medications   Medication Sig Dispense Refill    olmesartan (BENICAR) 5 mg tablet Take 1 Tablet by mouth daily. 90 Tablet 0    hydroCHLOROthiazide (HYDRODIURIL) 12.5 mg tablet Take 1 Tablet by mouth daily. 90 Tablet 0    ZINC PO Take  by mouth.  triamcinolone acetonide (KENALOG) 0.1 % topical cream       Calcium-Cholecalciferol, D3, (Calcium 600 with Vitamin D3) 600 mg(1,500mg) -400 unit chew Take  by mouth. Calcium 520mgs / Vitamin D3 50mcg 2 tablets a day      dorzolamide-timolol (COSOPT) 22.3-6.8 mg/mL ophthalmic solution Administer 1 Drop to both eyes two (2) times a day.  latanoprost (XALATAN) 0.005 % ophthalmic solution Administer 1 Drop to both eyes daily.  calcium carb/vit D3/minerals (CALCIUM-VITAMIN D PO) Take  by mouth. (Patient not taking: Reported on 9/14/2021)      metroNIDAZOLE (METROCREAM) 0.75 % topical cream Apply  to affected area two (2) times a day.  (Patient not taking: Reported on 12/6/2021)      OTHER,NON-FORMULARY, Zinc 30 mg 1 tablet once a day. (Patient not taking: Reported on 9/14/2021)       Allergies   Allergen Reactions    Amlodipine Other (comments)     edema       Review of Systems - General ROS: negative for - chills or fever  Cardiovascular ROS: no chest pain or dyspnea on exertion  Respiratory ROS: no cough, shortness of breath, or wheezing    Visit Vitals  BP (!) 183/91 (BP 1 Location: Left upper arm, BP Patient Position: Sitting, BP Cuff Size: Adult)   Pulse 66   Temp 98.1 °F (36.7 °C) (Oral)   Resp 14   Ht 5' 5\" (1.651 m)   Wt 146 lb (66.2 kg)   SpO2 99%   BMI 24.30 kg/m²     Constitutional: [x] Appears well-developed and well-nourished [x] No apparent distress      [] Abnormal -     Mental status: [x] Alert and awake  [x] Oriented to person/place/time [x] Able to follow commands    [] Abnormal -     Eyes:   EOM    [x]  Normal    [] Abnormal -   Sclera  [x]  Normal    [] Abnormal -          Discharge [x]  None visible   [] Abnormal -     HENT: [x] Normocephalic, atraumatic  [] Abnormal -   [x] Mouth/Throat: Mucous membranes are moist    External Ears [x] Normal  [] Abnormal -    Neck: [x] No visualized mass [] Abnormal -     Pulmonary/Chest: [x] Respiratory effort normal   [x] No visualized signs of difficulty breathing or respiratory distress        [] Abnormal -      Musculoskeletal:   [x] Normal gait with no signs of ataxia         [x] Normal range of motion of neck        [] Abnormal -     Neurological:        [x] No Facial Asymmetry (Cranial nerve 7 motor function) (limited exam due to video visit)          [x] No gaze palsy        [] Abnormal -          Skin:        [x] No significant exanthematous lesions or discoloration noted on facial skin         [] Abnormal -            Psychiatric:       [x] Normal Affect [] Abnormal -        [x] No Hallucinations        This medical record was transcribed using an electronic medical records/speech recognition system.   Although proofread, it may and can contain electronic, spelling and other errors. Corrections may be executed at a later time. Please contact us for any clarifications as needed. On this date 05/25/22  I have spent 34minutes reviewing previous notes, test results and face to face with the patient discussing the diagnosis and importance of compliance with the treatment plan as well as documenting on the day of the visit.

## 2022-05-26 LAB
APPEARANCE UR: CLEAR
BACTERIA URNS QL MICRO: ABNORMAL /HPF
BILIRUB UR QL: NEGATIVE
COLOR UR: ABNORMAL
EPITH CASTS URNS QL MICRO: ABNORMAL /LPF
GLUCOSE UR STRIP.AUTO-MCNC: NEGATIVE MG/DL
HGB UR QL STRIP: NEGATIVE
HYALINE CASTS URNS QL MICRO: ABNORMAL /LPF (ref 0–5)
KETONES UR QL STRIP.AUTO: NEGATIVE MG/DL
LEUKOCYTE ESTERASE UR QL STRIP.AUTO: NEGATIVE
NITRITE UR QL STRIP.AUTO: NEGATIVE
PH UR STRIP: 7 [PH] (ref 5–8)
PROT UR STRIP-MCNC: NEGATIVE MG/DL
RBC #/AREA URNS HPF: ABNORMAL /HPF (ref 0–5)
SP GR UR REFRACTOMETRY: 1.01 (ref 1–1.03)
UROBILINOGEN UR QL STRIP.AUTO: 0.2 EU/DL (ref 0.2–1)
WBC URNS QL MICRO: ABNORMAL /HPF (ref 0–4)

## 2022-05-27 LAB
BACTERIA SPEC CULT: NORMAL
CC UR VC: NORMAL
SERVICE CMNT-IMP: NORMAL

## 2022-07-10 DIAGNOSIS — I10 ESSENTIAL HYPERTENSION: ICD-10-CM

## 2022-07-11 RX ORDER — OLMESARTAN MEDOXOMIL 5 MG/1
5 TABLET ORAL DAILY
Qty: 90 TABLET | Refills: 0 | Status: SHIPPED | OUTPATIENT
Start: 2022-07-11

## 2022-07-11 RX ORDER — HYDROCHLOROTHIAZIDE 12.5 MG/1
TABLET ORAL
Qty: 90 TABLET | Refills: 0 | Status: SHIPPED | OUTPATIENT
Start: 2022-07-11

## 2022-10-12 ENCOUNTER — TELEPHONE (OUTPATIENT)
Dept: OBGYN CLINIC | Age: 82
End: 2022-10-12

## 2022-10-12 NOTE — TELEPHONE ENCOUNTER
Pt called name and  verified. Pt states she will be requesting records gave [employer] number also went over office visit notes from 2018.

## 2022-11-08 ENCOUNTER — TELEPHONE (OUTPATIENT)
Dept: INTERNAL MEDICINE CLINIC | Age: 82
End: 2022-11-08

## 2022-11-08 DIAGNOSIS — H40.9 GLAUCOMA OF BOTH EYES, UNSPECIFIED GLAUCOMA TYPE: Primary | ICD-10-CM

## 2022-11-08 NOTE — TELEPHONE ENCOUNTER
Patient called to state she needs a referral and an authorization sent to [facility] who can be reached by phone at [phone]. Please fax the referral to [phone]. Patient is seeing a specialist Dr. Candy Breaux for an initial visit as the patient has glaucoma. Patient is seeing this doctor tomorrow 11/9 at 12:10. Patient states she still has [employer]. Please fax the patient's last office note and labs to them as well. Patient states if this referral and authorization cannot be completed in time, please call her to let her know so that she can reschedule the appointment.

## 2022-11-08 NOTE — TELEPHONE ENCOUNTER
----- Message from [employer] [address] sent at 11/7/2022  4:34 PM EST -----  Subject: Referral Request    Reason for referral request? [facility]  Provider patient wants to be referred to(if known):     Provider Phone Number(if known): Additional Information for Provider?  [facility] in [address]: [phone] Has appointment 11/9 12:10 pm but needs referral   sent first.   ---------------------------------------------------------------------------  --------------  [id number]    [phone]; OK to leave message on voicemail  ---------------------------------------------------------------------------  --------------

## 2022-11-09 NOTE — TELEPHONE ENCOUNTER
I did attempt to obtain referral however when submitted received notification that I was not authorized to request referral. The plan was to contact [facility] this morning but I received the message from pt that will be rescheduling until 2023 when has a PPO policy and does not require a referral.

## 2022-11-09 NOTE — TELEPHONE ENCOUNTER
I did attempt to obtain referral however when submitted received notification that I was not authorized to request referral. The plan was to contact [facility] this morning but I received the message from pt that will be rescheduling until 2023 when has a PPO policy and does not require a referral.

## 2022-11-09 NOTE — TELEPHONE ENCOUNTER
Patient called to follow up on her ref request with [employer] rep on the line, per rep they do not require an authorization be requested however, per the [facility] states she needs an HMO auth, patient decided she will r/s her apt until 2023when she has a PPO plan , rep verbalized PPO will not need a ref.

## 2022-11-30 DIAGNOSIS — I10 ESSENTIAL HYPERTENSION: ICD-10-CM

## 2022-11-30 RX ORDER — OLMESARTAN MEDOXOMIL 5 MG/1
5 TABLET ORAL DAILY
Qty: 90 TABLET | Refills: 0 | Status: SHIPPED | OUTPATIENT
Start: 2022-11-30

## 2022-11-30 NOTE — TELEPHONE ENCOUNTER
Patient is requesting a refill on olmesartan (BENICAR) 5 mg tablet   Patient states she hasnt been to est care with a new PCP yet in [place]. Please send to re[facility] [place] on file.

## 2023-05-25 ENCOUNTER — TELEPHONE (OUTPATIENT)
Age: 83
End: 2023-05-25

## 2023-05-26 RX ORDER — OLMESARTAN MEDOXOMIL 5 MG/1
5 TABLET ORAL DAILY
Qty: 30 TABLET | Refills: 0 | Status: SHIPPED | OUTPATIENT
Start: 2023-05-26

## 2023-05-30 RX ORDER — OLMESARTAN MEDOXOMIL 5 MG/1
TABLET ORAL
Qty: 90 TABLET | OUTPATIENT
Start: 2023-05-30

## 2023-06-26 RX ORDER — OLMESARTAN MEDOXOMIL 5 MG/1
TABLET ORAL
Qty: 90 TABLET | Refills: 0 | Status: SHIPPED | OUTPATIENT
Start: 2023-06-26

## 2023-06-26 RX ORDER — OLMESARTAN MEDOXOMIL 5 MG/1
TABLET ORAL
Qty: 30 TABLET | Refills: 0 | Status: SHIPPED | OUTPATIENT
Start: 2023-06-26 | End: 2023-06-26

## (undated) DEVICE — BRUSH CYTO BRONCHSCP 1.5/140MM -- CELLEBRITY

## (undated) DEVICE — 1200 GUARD II KIT W/5MM TUBE W/O VAC TUBE: Brand: GUARDIAN

## (undated) DEVICE — ELECTRODE,RADIOTRANSLUCENT,FOAM,3PK: Brand: MEDLINE

## (undated) DEVICE — NDL FLTR TIP 5 MIC 18GX1.5IN --

## (undated) DEVICE — AIRLIFE™ ADULT OXYGEN MASK VINYL, UNDER-THE-CHIN STYLE, 3 IN 1 MASK WITH 7 FEET (2.1 M) CRUSH-RESISTANT TUBING AND U/CONNECT-IT ADAPTER: Brand: AIRLIFE™

## (undated) DEVICE — SET ADMIN 16ML TBNG L100IN 2 Y INJ SITE IV PIGGY BK DISP

## (undated) DEVICE — SOLIDIFIER MEDC 1200ML -- CONVERT TO 356117

## (undated) DEVICE — Device

## (undated) DEVICE — BAG BELONG PT PERS CLEAR HANDL

## (undated) DEVICE — CATH IV AUTOGRD BC BLU 22GA 25 -- INSYTE

## (undated) DEVICE — SYR 3ML LL TIP 1/10ML GRAD --

## (undated) DEVICE — BITEBLOCK ENDOSCP 60FR MAXI WHT POLYETH STURDY W/ VELC WVN

## (undated) DEVICE — SYRINGE MED 10CC ECC TIP W/O NDL

## (undated) DEVICE — TRAP,MUCUS SPECIMEN, 80CC: Brand: MEDLINE

## (undated) DEVICE — NEEDLE SCLERO 23GA L4MM CATH L240CM CNTRST SHTH DIA1.8MM

## (undated) DEVICE — SYR 5ML 1/5 GRAD LL NSAF LF --

## (undated) DEVICE — KIT COLON W/ 1.1OZ LUB AND 2 END

## (undated) DEVICE — BASIN EMSIS 16OZ GRAPHITE PLAS KID SHP MOLD GRAD FOR ORAL

## (undated) DEVICE — CONTAINER SPEC 20 ML LID NEUT BUFF FORMALIN 10 % POLYPR STS

## (undated) DEVICE — BAG SPEC BIOHZRD 10 X 10 IN --

## (undated) DEVICE — SYR 10ML LUER LOK 1/5ML GRAD --

## (undated) DEVICE — NDL PRT INJ NSAF BLNT 18GX1.5 --

## (undated) DEVICE — AIRLIFE™ CORRUGATED FLEXIBLE EVA TUBING FOR AEROSOL AND IPPB USE, SEGMENTED, 6 FEET (1.8 M) LENGTH, 22 MM I.D.: Brand: AIRLIFE™

## (undated) DEVICE — FCPS BIOP PULM RAD JAW 100CML -- BX/10 M00515180